# Patient Record
Sex: MALE | Race: WHITE | Employment: OTHER | URBAN - METROPOLITAN AREA
[De-identification: names, ages, dates, MRNs, and addresses within clinical notes are randomized per-mention and may not be internally consistent; named-entity substitution may affect disease eponyms.]

---

## 2018-03-01 ENCOUNTER — SURG/PROC ORDERS (OUTPATIENT)
Dept: CARDIOLOGY | Age: 83
End: 2018-03-01

## 2018-03-01 PROBLEM — R65.20 SEVERE SEPSIS WITH ACUTE ORGAN DYSFUNCTION (HCC): Status: ACTIVE | Noted: 2018-03-01

## 2018-03-01 PROBLEM — A41.9 SEVERE SEPSIS WITH ACUTE ORGAN DYSFUNCTION (HCC): Status: ACTIVE | Noted: 2018-03-01

## 2018-03-01 RX ORDER — DIPHENHYDRAMINE HCL 25 MG
25 TABLET ORAL
Status: CANCELLED | OUTPATIENT
Start: 2018-03-01 | End: 2018-03-01

## 2018-03-01 RX ORDER — SODIUM CHLORIDE 0.9 % (FLUSH) 0.9 %
10 SYRINGE (ML) INJECTION EVERY 12 HOURS SCHEDULED
Status: CANCELLED | OUTPATIENT
Start: 2018-03-01

## 2018-03-01 RX ORDER — DIAZEPAM 5 MG/1
5 TABLET ORAL
Status: CANCELLED | OUTPATIENT
Start: 2018-03-01 | End: 2018-03-01

## 2018-03-01 RX ORDER — SODIUM CHLORIDE 9 MG/ML
INJECTION, SOLUTION INTRAVENOUS CONTINUOUS
Status: CANCELLED | OUTPATIENT
Start: 2018-03-01

## 2018-03-01 RX ORDER — SODIUM CHLORIDE 0.9 % (FLUSH) 0.9 %
10 SYRINGE (ML) INJECTION PRN
Status: CANCELLED | OUTPATIENT
Start: 2018-03-01

## 2018-03-14 PROBLEM — G93.41 METABOLIC ENCEPHALOPATHY: Status: ACTIVE | Noted: 2018-03-14

## 2018-03-15 PROBLEM — R26.9 GAIT DISTURBANCE: Status: ACTIVE | Noted: 2018-03-15

## 2018-03-15 PROBLEM — G93.41 SEPSIS WITH METABOLIC ENCEPHALOPATHY (HCC): Status: ACTIVE | Noted: 2018-03-15

## 2018-03-15 PROBLEM — R41.89 IMPAIRED COGNITION: Status: ACTIVE | Noted: 2018-03-15

## 2018-03-15 PROBLEM — R65.20 SEPSIS WITH METABOLIC ENCEPHALOPATHY (HCC): Status: ACTIVE | Noted: 2018-03-15

## 2018-03-15 PROBLEM — A41.9 SEPSIS WITH METABOLIC ENCEPHALOPATHY (HCC): Status: ACTIVE | Noted: 2018-03-15

## 2019-03-02 ENCOUNTER — APPOINTMENT (OUTPATIENT)
Dept: GENERAL RADIOLOGY | Age: 84
DRG: 280 | End: 2019-03-02
Payer: MEDICARE

## 2019-03-02 ENCOUNTER — HOSPITAL ENCOUNTER (INPATIENT)
Age: 84
LOS: 3 days | Discharge: HOME | DRG: 280 | End: 2019-03-05
Attending: EMERGENCY MEDICINE | Admitting: HOSPITALIST
Payer: MEDICARE

## 2019-03-02 DIAGNOSIS — N18.30 STAGE 3 CHRONIC KIDNEY DISEASE (HCC): ICD-10-CM

## 2019-03-02 DIAGNOSIS — I50.9 ACUTE HEART FAILURE, UNSPECIFIED HEART FAILURE TYPE (HCC): Primary | ICD-10-CM

## 2019-03-02 PROBLEM — I50.43 CHF (CONGESTIVE HEART FAILURE), NYHA CLASS I, ACUTE ON CHRONIC, COMBINED (HCC): Status: ACTIVE | Noted: 2019-03-02

## 2019-03-02 LAB
ALBUMIN SERPL-MCNC: 3.6 GM/DL (ref 3.4–5)
ALP BLD-CCNC: 65 IU/L (ref 40–129)
ALT SERPL-CCNC: 11 U/L (ref 10–40)
ANION GAP SERPL CALCULATED.3IONS-SCNC: 13 MMOL/L (ref 4–16)
AST SERPL-CCNC: 21 IU/L (ref 15–37)
BASOPHILS ABSOLUTE: 0.1 K/CU MM
BASOPHILS RELATIVE PERCENT: 0.6 % (ref 0–1)
BILIRUB SERPL-MCNC: 0.3 MG/DL (ref 0–1)
BUN BLDV-MCNC: 19 MG/DL (ref 6–23)
CALCIUM SERPL-MCNC: 7.6 MG/DL (ref 8.3–10.6)
CHLORIDE BLD-SCNC: 104 MMOL/L (ref 99–110)
CO2: 20 MMOL/L (ref 21–32)
CREAT SERPL-MCNC: 1.3 MG/DL (ref 0.9–1.3)
D DIMER: 280 NG/ML(DDU)
DIFFERENTIAL TYPE: ABNORMAL
EKG ATRIAL RATE: 114 BPM
EKG DIAGNOSIS: NORMAL
EKG P AXIS: 114 DEGREES
EKG P-R INTERVAL: 166 MS
EKG Q-T INTERVAL: 316 MS
EKG QRS DURATION: 100 MS
EKG QTC CALCULATION (BAZETT): 435 MS
EKG R AXIS: 74 DEGREES
EKG T AXIS: 167 DEGREES
EKG VENTRICULAR RATE: 114 BPM
EOSINOPHILS ABSOLUTE: 0.1 K/CU MM
EOSINOPHILS RELATIVE PERCENT: 0.7 % (ref 0–3)
GFR AFRICAN AMERICAN: >60 ML/MIN/1.73M2
GFR NON-AFRICAN AMERICAN: 53 ML/MIN/1.73M2
GLUCOSE BLD-MCNC: 101 MG/DL (ref 70–99)
GLUCOSE BLD-MCNC: 148 MG/DL (ref 70–99)
HCT VFR BLD CALC: 32.7 % (ref 42–52)
HEMOGLOBIN: 9 GM/DL (ref 13.5–18)
IMMATURE NEUTROPHIL %: 0.4 % (ref 0–0.43)
LYMPHOCYTES ABSOLUTE: 0.5 K/CU MM
LYMPHOCYTES RELATIVE PERCENT: 6.5 % (ref 24–44)
MCH RBC QN AUTO: 21.5 PG (ref 27–31)
MCHC RBC AUTO-ENTMCNC: 27.5 % (ref 32–36)
MCV RBC AUTO: 78.2 FL (ref 78–100)
MONOCYTES ABSOLUTE: 1 K/CU MM
MONOCYTES RELATIVE PERCENT: 12.5 % (ref 0–4)
NUCLEATED RBC %: 0 %
PDW BLD-RTO: 17.4 % (ref 11.7–14.9)
PLATELET # BLD: 273 K/CU MM (ref 140–440)
PMV BLD AUTO: 10 FL (ref 7.5–11.1)
POTASSIUM SERPL-SCNC: 4.7 MMOL/L (ref 3.5–5.1)
PRO-BNP: 7281 PG/ML
RBC # BLD: 4.18 M/CU MM (ref 4.6–6.2)
SEGMENTED NEUTROPHILS ABSOLUTE COUNT: 6.6 K/CU MM
SEGMENTED NEUTROPHILS RELATIVE PERCENT: 79.3 % (ref 36–66)
SODIUM BLD-SCNC: 137 MMOL/L (ref 135–145)
TOTAL IMMATURE NEUTOROPHIL: 0.03 K/CU MM
TOTAL NUCLEATED RBC: 0 K/CU MM
TOTAL PROTEIN: 6.3 GM/DL (ref 6.4–8.2)
TROPONIN T: 0.01 NG/ML
WBC # BLD: 8.3 K/CU MM (ref 4–10.5)

## 2019-03-02 PROCEDURE — 80053 COMPREHEN METABOLIC PANEL: CPT

## 2019-03-02 PROCEDURE — 93005 ELECTROCARDIOGRAM TRACING: CPT

## 2019-03-02 PROCEDURE — 99285 EMERGENCY DEPT VISIT HI MDM: CPT

## 2019-03-02 PROCEDURE — 6370000000 HC RX 637 (ALT 250 FOR IP): Performed by: HOSPITALIST

## 2019-03-02 PROCEDURE — 82962 GLUCOSE BLOOD TEST: CPT

## 2019-03-02 PROCEDURE — 85025 COMPLETE CBC W/AUTO DIFF WBC: CPT

## 2019-03-02 PROCEDURE — 6360000002 HC RX W HCPCS: Performed by: HOSPITALIST

## 2019-03-02 PROCEDURE — 84484 ASSAY OF TROPONIN QUANT: CPT

## 2019-03-02 PROCEDURE — 71045 X-RAY EXAM CHEST 1 VIEW: CPT

## 2019-03-02 PROCEDURE — 6370000000 HC RX 637 (ALT 250 FOR IP): Performed by: EMERGENCY MEDICINE

## 2019-03-02 PROCEDURE — 2060000000 HC ICU INTERMEDIATE R&B

## 2019-03-02 PROCEDURE — 36415 COLL VENOUS BLD VENIPUNCTURE: CPT

## 2019-03-02 PROCEDURE — 85379 FIBRIN DEGRADATION QUANT: CPT

## 2019-03-02 PROCEDURE — 96374 THER/PROPH/DIAG INJ IV PUSH: CPT

## 2019-03-02 PROCEDURE — 93010 ELECTROCARDIOGRAM REPORT: CPT | Performed by: INTERNAL MEDICINE

## 2019-03-02 PROCEDURE — 99223 1ST HOSP IP/OBS HIGH 75: CPT | Performed by: INTERNAL MEDICINE

## 2019-03-02 PROCEDURE — 83880 ASSAY OF NATRIURETIC PEPTIDE: CPT

## 2019-03-02 RX ORDER — ONDANSETRON 2 MG/ML
4 INJECTION INTRAMUSCULAR; INTRAVENOUS EVERY 6 HOURS PRN
Status: DISCONTINUED | OUTPATIENT
Start: 2019-03-02 | End: 2019-03-03 | Stop reason: SDUPTHER

## 2019-03-02 RX ORDER — ASPIRIN 81 MG/1
324 TABLET, CHEWABLE ORAL ONCE
Status: COMPLETED | OUTPATIENT
Start: 2019-03-02 | End: 2019-03-02

## 2019-03-02 RX ORDER — LISINOPRIL 2.5 MG/1
2.5 TABLET ORAL DAILY
Status: DISCONTINUED | OUTPATIENT
Start: 2019-03-02 | End: 2019-03-03

## 2019-03-02 RX ORDER — ATORVASTATIN CALCIUM 40 MG/1
40 TABLET, FILM COATED ORAL NIGHTLY
Status: DISCONTINUED | OUTPATIENT
Start: 2019-03-02 | End: 2019-03-05 | Stop reason: HOSPADM

## 2019-03-02 RX ORDER — FUROSEMIDE 10 MG/ML
40 INJECTION INTRAMUSCULAR; INTRAVENOUS 2 TIMES DAILY
Status: DISCONTINUED | OUTPATIENT
Start: 2019-03-02 | End: 2019-03-03

## 2019-03-02 RX ORDER — FUROSEMIDE 10 MG/ML
40 INJECTION INTRAMUSCULAR; INTRAVENOUS ONCE
Status: COMPLETED | OUTPATIENT
Start: 2019-03-02 | End: 2019-03-02

## 2019-03-02 RX ORDER — SODIUM CHLORIDE 0.9 % (FLUSH) 0.9 %
10 SYRINGE (ML) INJECTION EVERY 12 HOURS SCHEDULED
Status: DISCONTINUED | OUTPATIENT
Start: 2019-03-02 | End: 2019-03-03 | Stop reason: SDUPTHER

## 2019-03-02 RX ORDER — ACETAMINOPHEN 325 MG/1
650 TABLET ORAL EVERY 4 HOURS PRN
Status: DISCONTINUED | OUTPATIENT
Start: 2019-03-02 | End: 2019-03-03 | Stop reason: SDUPTHER

## 2019-03-02 RX ORDER — POTASSIUM CHLORIDE 20 MEQ/1
20 TABLET, EXTENDED RELEASE ORAL 2 TIMES DAILY WITH MEALS
Status: DISCONTINUED | OUTPATIENT
Start: 2019-03-02 | End: 2019-03-03

## 2019-03-02 RX ORDER — SODIUM CHLORIDE 0.9 % (FLUSH) 0.9 %
10 SYRINGE (ML) INJECTION PRN
Status: DISCONTINUED | OUTPATIENT
Start: 2019-03-02 | End: 2019-03-03 | Stop reason: SDUPTHER

## 2019-03-02 RX ORDER — QUETIAPINE FUMARATE 25 MG/1
25 TABLET, FILM COATED ORAL NIGHTLY
Status: DISCONTINUED | OUTPATIENT
Start: 2019-03-02 | End: 2019-03-05 | Stop reason: HOSPADM

## 2019-03-02 RX ORDER — CARVEDILOL 3.12 MG/1
3.12 TABLET ORAL 2 TIMES DAILY WITH MEALS
Status: DISCONTINUED | OUTPATIENT
Start: 2019-03-02 | End: 2019-03-05 | Stop reason: HOSPADM

## 2019-03-02 RX ORDER — ASPIRIN 81 MG/1
81 TABLET, CHEWABLE ORAL DAILY
Status: DISCONTINUED | OUTPATIENT
Start: 2019-03-03 | End: 2019-03-05 | Stop reason: HOSPADM

## 2019-03-02 RX ORDER — CLOPIDOGREL BISULFATE 75 MG/1
75 TABLET ORAL DAILY
Status: DISCONTINUED | OUTPATIENT
Start: 2019-03-02 | End: 2019-03-05

## 2019-03-02 RX ORDER — PANTOPRAZOLE SODIUM 40 MG/1
40 TABLET, DELAYED RELEASE ORAL
Status: DISCONTINUED | OUTPATIENT
Start: 2019-03-03 | End: 2019-03-05 | Stop reason: HOSPADM

## 2019-03-02 RX ADMIN — POTASSIUM CHLORIDE 20 MEQ: 20 TABLET, EXTENDED RELEASE ORAL at 17:00

## 2019-03-02 RX ADMIN — FUROSEMIDE 40 MG: 10 INJECTION, SOLUTION INTRAMUSCULAR; INTRAVENOUS at 17:00

## 2019-03-02 RX ADMIN — CLOPIDOGREL BISULFATE 75 MG: 75 TABLET ORAL at 13:22

## 2019-03-02 RX ADMIN — FUROSEMIDE 40 MG: 10 INJECTION, SOLUTION INTRAVENOUS at 11:15

## 2019-03-02 RX ADMIN — ASPIRIN 81 MG 324 MG: 81 TABLET ORAL at 09:59

## 2019-03-02 RX ADMIN — ATORVASTATIN CALCIUM 40 MG: 40 TABLET, FILM COATED ORAL at 20:40

## 2019-03-02 RX ADMIN — ENOXAPARIN SODIUM 30 MG: 30 INJECTION SUBCUTANEOUS at 13:22

## 2019-03-02 RX ADMIN — CARVEDILOL 3.12 MG: 3.12 TABLET, FILM COATED ORAL at 17:00

## 2019-03-02 RX ADMIN — QUETIAPINE FUMARATE 25 MG: 25 TABLET ORAL at 20:40

## 2019-03-02 RX ADMIN — LISINOPRIL 2.5 MG: 2.5 TABLET ORAL at 15:02

## 2019-03-02 ASSESSMENT — PAIN DESCRIPTION - LOCATION
LOCATION: CHEST
LOCATION: CHEST

## 2019-03-02 ASSESSMENT — PAIN DESCRIPTION - DESCRIPTORS
DESCRIPTORS: PRESSURE
DESCRIPTORS: PRESSURE

## 2019-03-02 ASSESSMENT — PAIN DESCRIPTION - PAIN TYPE: TYPE: ACUTE PAIN

## 2019-03-02 ASSESSMENT — PAIN SCALES - GENERAL
PAINLEVEL_OUTOF10: 0
PAINLEVEL_OUTOF10: 4
PAINLEVEL_OUTOF10: 6

## 2019-03-03 PROBLEM — I25.5 ISCHEMIC CARDIOMYOPATHY: Status: ACTIVE | Noted: 2019-03-03

## 2019-03-03 LAB
ANION GAP SERPL CALCULATED.3IONS-SCNC: 13 MMOL/L (ref 4–16)
APTT: 42 SECONDS (ref 21.2–33)
APTT: 71.2 SECONDS (ref 21.2–33)
APTT: >240 SECONDS (ref 21.2–33)
BUN BLDV-MCNC: 22 MG/DL (ref 6–23)
CALCIUM SERPL-MCNC: 7.6 MG/DL (ref 8.3–10.6)
CHLORIDE BLD-SCNC: 104 MMOL/L (ref 99–110)
CO2: 23 MMOL/L (ref 21–32)
CREAT SERPL-MCNC: 1.5 MG/DL (ref 0.9–1.3)
EKG ATRIAL RATE: 76 BPM
EKG ATRIAL RATE: 90 BPM
EKG DIAGNOSIS: NORMAL
EKG DIAGNOSIS: NORMAL
EKG P AXIS: 102 DEGREES
EKG P AXIS: 38 DEGREES
EKG P-R INTERVAL: 148 MS
EKG P-R INTERVAL: 176 MS
EKG Q-T INTERVAL: 366 MS
EKG Q-T INTERVAL: 378 MS
EKG QRS DURATION: 74 MS
EKG QRS DURATION: 90 MS
EKG QTC CALCULATION (BAZETT): 425 MS
EKG QTC CALCULATION (BAZETT): 447 MS
EKG R AXIS: 68 DEGREES
EKG R AXIS: 72 DEGREES
EKG T AXIS: 114 DEGREES
EKG T AXIS: 177 DEGREES
EKG VENTRICULAR RATE: 76 BPM
EKG VENTRICULAR RATE: 90 BPM
FERRITIN: 10 NG/ML (ref 30–400)
GFR AFRICAN AMERICAN: 54 ML/MIN/1.73M2
GFR NON-AFRICAN AMERICAN: 45 ML/MIN/1.73M2
GLUCOSE BLD-MCNC: 103 MG/DL (ref 70–99)
HCT VFR BLD CALC: 32.2 % (ref 42–52)
HCT VFR BLD CALC: 32.5 % (ref 42–52)
HEMOGLOBIN: 8.8 GM/DL (ref 13.5–18)
HEMOGLOBIN: 8.9 GM/DL (ref 13.5–18)
IRON: 17 UG/DL (ref 59–158)
MCH RBC QN AUTO: 21.4 PG (ref 27–31)
MCH RBC QN AUTO: 21.4 PG (ref 27–31)
MCHC RBC AUTO-ENTMCNC: 27.3 % (ref 32–36)
MCHC RBC AUTO-ENTMCNC: 27.4 % (ref 32–36)
MCV RBC AUTO: 78.1 FL (ref 78–100)
MCV RBC AUTO: 78.3 FL (ref 78–100)
PCT TRANSFERRIN: 5 % (ref 10–44)
PDW BLD-RTO: 17.7 % (ref 11.7–14.9)
PDW BLD-RTO: 17.8 % (ref 11.7–14.9)
PLATELET # BLD: 249 K/CU MM (ref 140–440)
PLATELET # BLD: 262 K/CU MM (ref 140–440)
PMV BLD AUTO: 10.3 FL (ref 7.5–11.1)
PMV BLD AUTO: 10.7 FL (ref 7.5–11.1)
POTASSIUM SERPL-SCNC: 3.9 MMOL/L (ref 3.5–5.1)
RBC # BLD: 4.11 M/CU MM (ref 4.6–6.2)
RBC # BLD: 4.16 M/CU MM (ref 4.6–6.2)
SODIUM BLD-SCNC: 140 MMOL/L (ref 135–145)
TOTAL IRON BINDING CAPACITY: 364 UG/DL (ref 250–450)
TROPONIN T: 0.2 NG/ML
TROPONIN T: 0.29 NG/ML
UNSATURATED IRON BINDING CAPACITY: 347 UG/DL (ref 110–370)
WBC # BLD: 5.5 K/CU MM (ref 4–10.5)
WBC # BLD: 5.5 K/CU MM (ref 4–10.5)

## 2019-03-03 PROCEDURE — C1894 INTRO/SHEATH, NON-LASER: HCPCS

## 2019-03-03 PROCEDURE — 2709999900 HC NON-CHARGEABLE SUPPLY

## 2019-03-03 PROCEDURE — 99233 SBSQ HOSP IP/OBS HIGH 50: CPT | Performed by: INTERNAL MEDICINE

## 2019-03-03 PROCEDURE — 83550 IRON BINDING TEST: CPT

## 2019-03-03 PROCEDURE — 6360000002 HC RX W HCPCS

## 2019-03-03 PROCEDURE — 2500000003 HC RX 250 WO HCPCS

## 2019-03-03 PROCEDURE — 83540 ASSAY OF IRON: CPT

## 2019-03-03 PROCEDURE — 85730 THROMBOPLASTIN TIME PARTIAL: CPT

## 2019-03-03 PROCEDURE — B2151ZZ FLUOROSCOPY OF LEFT HEART USING LOW OSMOLAR CONTRAST: ICD-10-PCS | Performed by: INTERNAL MEDICINE

## 2019-03-03 PROCEDURE — 94761 N-INVAS EAR/PLS OXIMETRY MLT: CPT

## 2019-03-03 PROCEDURE — 84484 ASSAY OF TROPONIN QUANT: CPT

## 2019-03-03 PROCEDURE — 93458 L HRT ARTERY/VENTRICLE ANGIO: CPT

## 2019-03-03 PROCEDURE — 2580000003 HC RX 258: Performed by: HOSPITALIST

## 2019-03-03 PROCEDURE — 4A023N7 MEASUREMENT OF CARDIAC SAMPLING AND PRESSURE, LEFT HEART, PERCUTANEOUS APPROACH: ICD-10-PCS | Performed by: INTERNAL MEDICINE

## 2019-03-03 PROCEDURE — 2580000003 HC RX 258

## 2019-03-03 PROCEDURE — 85027 COMPLETE CBC AUTOMATED: CPT

## 2019-03-03 PROCEDURE — 93005 ELECTROCARDIOGRAM TRACING: CPT

## 2019-03-03 PROCEDURE — 6360000002 HC RX W HCPCS: Performed by: INTERNAL MEDICINE

## 2019-03-03 PROCEDURE — 6360000002 HC RX W HCPCS: Performed by: HOSPITALIST

## 2019-03-03 PROCEDURE — 82728 ASSAY OF FERRITIN: CPT

## 2019-03-03 PROCEDURE — 36415 COLL VENOUS BLD VENIPUNCTURE: CPT

## 2019-03-03 PROCEDURE — 6370000000 HC RX 637 (ALT 250 FOR IP): Performed by: INTERNAL MEDICINE

## 2019-03-03 PROCEDURE — 2060000000 HC ICU INTERMEDIATE R&B

## 2019-03-03 PROCEDURE — 6360000004 HC RX CONTRAST MEDICATION

## 2019-03-03 PROCEDURE — B2111ZZ FLUOROSCOPY OF MULTIPLE CORONARY ARTERIES USING LOW OSMOLAR CONTRAST: ICD-10-PCS | Performed by: INTERNAL MEDICINE

## 2019-03-03 PROCEDURE — 80048 BASIC METABOLIC PNL TOTAL CA: CPT

## 2019-03-03 PROCEDURE — 2580000003 HC RX 258: Performed by: INTERNAL MEDICINE

## 2019-03-03 PROCEDURE — 93010 ELECTROCARDIOGRAM REPORT: CPT | Performed by: INTERNAL MEDICINE

## 2019-03-03 PROCEDURE — 6370000000 HC RX 637 (ALT 250 FOR IP): Performed by: HOSPITALIST

## 2019-03-03 RX ORDER — HEPARIN SODIUM 5000 [USP'U]/ML
30 INJECTION, SOLUTION INTRAVENOUS; SUBCUTANEOUS PRN
Status: DISCONTINUED | OUTPATIENT
Start: 2019-03-03 | End: 2019-03-05

## 2019-03-03 RX ORDER — HEPARIN SODIUM 10000 [USP'U]/100ML
12 INJECTION, SOLUTION INTRAVENOUS CONTINUOUS
Status: DISCONTINUED | OUTPATIENT
Start: 2019-03-03 | End: 2019-03-05

## 2019-03-03 RX ORDER — HYDRALAZINE HYDROCHLORIDE 20 MG/ML
10 INJECTION INTRAMUSCULAR; INTRAVENOUS EVERY 10 MIN PRN
Status: DISCONTINUED | OUTPATIENT
Start: 2019-03-03 | End: 2019-03-05 | Stop reason: HOSPADM

## 2019-03-03 RX ORDER — FUROSEMIDE 40 MG/1
40 TABLET ORAL 2 TIMES DAILY
Status: DISCONTINUED | OUTPATIENT
Start: 2019-03-03 | End: 2019-03-03

## 2019-03-03 RX ORDER — FUROSEMIDE 40 MG/1
40 TABLET ORAL DAILY
Status: DISCONTINUED | OUTPATIENT
Start: 2019-03-03 | End: 2019-03-05

## 2019-03-03 RX ORDER — HEPARIN SODIUM 5000 [USP'U]/ML
4000 INJECTION, SOLUTION INTRAVENOUS; SUBCUTANEOUS ONCE
Status: COMPLETED | OUTPATIENT
Start: 2019-03-03 | End: 2019-03-03

## 2019-03-03 RX ORDER — LISINOPRIL 2.5 MG/1
2.5 TABLET ORAL DAILY
Status: DISCONTINUED | OUTPATIENT
Start: 2019-03-03 | End: 2019-03-05

## 2019-03-03 RX ORDER — ONDANSETRON 2 MG/ML
4 INJECTION INTRAMUSCULAR; INTRAVENOUS EVERY 6 HOURS PRN
Status: DISCONTINUED | OUTPATIENT
Start: 2019-03-03 | End: 2019-03-05 | Stop reason: HOSPADM

## 2019-03-03 RX ORDER — SODIUM CHLORIDE 0.9 % (FLUSH) 0.9 %
10 SYRINGE (ML) INJECTION EVERY 12 HOURS SCHEDULED
Status: DISCONTINUED | OUTPATIENT
Start: 2019-03-03 | End: 2019-03-05 | Stop reason: HOSPADM

## 2019-03-03 RX ORDER — SODIUM CHLORIDE 0.9 % (FLUSH) 0.9 %
10 SYRINGE (ML) INJECTION PRN
Status: DISCONTINUED | OUTPATIENT
Start: 2019-03-03 | End: 2019-03-05 | Stop reason: HOSPADM

## 2019-03-03 RX ORDER — HEPARIN SODIUM 1000 [USP'U]/ML
60 INJECTION, SOLUTION INTRAVENOUS; SUBCUTANEOUS PRN
Status: DISCONTINUED | OUTPATIENT
Start: 2019-03-03 | End: 2019-03-05

## 2019-03-03 RX ORDER — NITROGLYCERIN 0.4 MG/1
0.4 TABLET SUBLINGUAL EVERY 5 MIN PRN
Status: DISCONTINUED | OUTPATIENT
Start: 2019-03-03 | End: 2019-03-05 | Stop reason: HOSPADM

## 2019-03-03 RX ORDER — ACETAMINOPHEN 325 MG/1
650 TABLET ORAL EVERY 4 HOURS PRN
Status: DISCONTINUED | OUTPATIENT
Start: 2019-03-03 | End: 2019-03-05 | Stop reason: HOSPADM

## 2019-03-03 RX ORDER — 0.9 % SODIUM CHLORIDE 0.9 %
500 INTRAVENOUS SOLUTION INTRAVENOUS ONCE
Status: DISCONTINUED | OUTPATIENT
Start: 2019-03-03 | End: 2019-03-05 | Stop reason: HOSPADM

## 2019-03-03 RX ADMIN — PANTOPRAZOLE SODIUM 40 MG: 40 TABLET, DELAYED RELEASE ORAL at 07:45

## 2019-03-03 RX ADMIN — LISINOPRIL 2.5 MG: 2.5 TABLET ORAL at 13:14

## 2019-03-03 RX ADMIN — CARVEDILOL 3.12 MG: 3.12 TABLET, FILM COATED ORAL at 17:36

## 2019-03-03 RX ADMIN — ATORVASTATIN CALCIUM 40 MG: 40 TABLET, FILM COATED ORAL at 22:17

## 2019-03-03 RX ADMIN — CLOPIDOGREL BISULFATE 75 MG: 75 TABLET ORAL at 09:10

## 2019-03-03 RX ADMIN — SODIUM CHLORIDE, PRESERVATIVE FREE 10 ML: 5 INJECTION INTRAVENOUS at 22:17

## 2019-03-03 RX ADMIN — HEPARIN SODIUM AND DEXTROSE 12 UNITS/KG/HR: 10000; 5 INJECTION INTRAVENOUS at 13:10

## 2019-03-03 RX ADMIN — SODIUM CHLORIDE, PRESERVATIVE FREE 10 ML: 5 INJECTION INTRAVENOUS at 00:45

## 2019-03-03 RX ADMIN — SODIUM CHLORIDE, PRESERVATIVE FREE 10 ML: 5 INJECTION INTRAVENOUS at 09:12

## 2019-03-03 RX ADMIN — ENOXAPARIN SODIUM 30 MG: 30 INJECTION SUBCUTANEOUS at 09:09

## 2019-03-03 RX ADMIN — IRON SUCROSE 200 MG: 20 INJECTION, SOLUTION INTRAVENOUS at 12:41

## 2019-03-03 RX ADMIN — FUROSEMIDE 40 MG: 40 TABLET ORAL at 15:21

## 2019-03-03 RX ADMIN — ASPIRIN 81 MG 81 MG: 81 TABLET ORAL at 09:10

## 2019-03-03 RX ADMIN — QUETIAPINE FUMARATE 25 MG: 25 TABLET ORAL at 22:17

## 2019-03-03 RX ADMIN — HEPARIN SODIUM 4000 UNITS: 5000 INJECTION, SOLUTION INTRAVENOUS; SUBCUTANEOUS at 13:10

## 2019-03-03 RX ADMIN — CARVEDILOL 3.12 MG: 3.12 TABLET, FILM COATED ORAL at 09:10

## 2019-03-03 ASSESSMENT — PAIN SCALES - GENERAL
PAINLEVEL_OUTOF10: 0

## 2019-03-04 LAB
ANION GAP SERPL CALCULATED.3IONS-SCNC: 11 MMOL/L (ref 4–16)
APTT: 113.7 SECONDS (ref 21.2–33)
APTT: 138.9 SECONDS (ref 21.2–33)
APTT: 187.1 SECONDS (ref 21.2–33)
APTT: 80 SECONDS (ref 21.2–33)
BUN BLDV-MCNC: 22 MG/DL (ref 6–23)
CALCIUM SERPL-MCNC: 7.4 MG/DL (ref 8.3–10.6)
CHLORIDE BLD-SCNC: 106 MMOL/L (ref 99–110)
CO2: 26 MMOL/L (ref 21–32)
CREAT SERPL-MCNC: 1.5 MG/DL (ref 0.9–1.3)
GFR AFRICAN AMERICAN: 54 ML/MIN/1.73M2
GFR NON-AFRICAN AMERICAN: 45 ML/MIN/1.73M2
GLUCOSE BLD-MCNC: 103 MG/DL (ref 70–99)
LV EF: 38 %
LVEF MODALITY: NORMAL
POTASSIUM SERPL-SCNC: 3.8 MMOL/L (ref 3.5–5.1)
SODIUM BLD-SCNC: 143 MMOL/L (ref 135–145)

## 2019-03-04 PROCEDURE — 90686 IIV4 VACC NO PRSV 0.5 ML IM: CPT | Performed by: INTERNAL MEDICINE

## 2019-03-04 PROCEDURE — G0008 ADMIN INFLUENZA VIRUS VAC: HCPCS | Performed by: INTERNAL MEDICINE

## 2019-03-04 PROCEDURE — 6360000002 HC RX W HCPCS: Performed by: INTERNAL MEDICINE

## 2019-03-04 PROCEDURE — 85730 THROMBOPLASTIN TIME PARTIAL: CPT

## 2019-03-04 PROCEDURE — 80048 BASIC METABOLIC PNL TOTAL CA: CPT

## 2019-03-04 PROCEDURE — 94761 N-INVAS EAR/PLS OXIMETRY MLT: CPT

## 2019-03-04 PROCEDURE — 99233 SBSQ HOSP IP/OBS HIGH 50: CPT | Performed by: INTERNAL MEDICINE

## 2019-03-04 PROCEDURE — 6370000000 HC RX 637 (ALT 250 FOR IP): Performed by: HOSPITALIST

## 2019-03-04 PROCEDURE — 93306 TTE W/DOPPLER COMPLETE: CPT

## 2019-03-04 PROCEDURE — 36415 COLL VENOUS BLD VENIPUNCTURE: CPT

## 2019-03-04 PROCEDURE — 2580000003 HC RX 258: Performed by: INTERNAL MEDICINE

## 2019-03-04 PROCEDURE — 2060000000 HC ICU INTERMEDIATE R&B

## 2019-03-04 PROCEDURE — 6370000000 HC RX 637 (ALT 250 FOR IP): Performed by: INTERNAL MEDICINE

## 2019-03-04 RX ORDER — ASPIRIN 81 MG/1
81 TABLET, CHEWABLE ORAL DAILY
Status: DISCONTINUED | OUTPATIENT
Start: 2019-03-04 | End: 2019-03-05 | Stop reason: HOSPADM

## 2019-03-04 RX ADMIN — QUETIAPINE FUMARATE 25 MG: 25 TABLET ORAL at 20:41

## 2019-03-04 RX ADMIN — SODIUM CHLORIDE, PRESERVATIVE FREE 10 ML: 5 INJECTION INTRAVENOUS at 11:10

## 2019-03-04 RX ADMIN — CLOPIDOGREL BISULFATE 75 MG: 75 TABLET ORAL at 08:30

## 2019-03-04 RX ADMIN — FUROSEMIDE 40 MG: 40 TABLET ORAL at 08:31

## 2019-03-04 RX ADMIN — CARVEDILOL 3.12 MG: 3.12 TABLET, FILM COATED ORAL at 18:56

## 2019-03-04 RX ADMIN — CARVEDILOL 3.12 MG: 3.12 TABLET, FILM COATED ORAL at 08:31

## 2019-03-04 RX ADMIN — PANTOPRAZOLE SODIUM 40 MG: 40 TABLET, DELAYED RELEASE ORAL at 06:15

## 2019-03-04 RX ADMIN — IRON SUCROSE 200 MG: 20 INJECTION, SOLUTION INTRAVENOUS at 11:09

## 2019-03-04 RX ADMIN — LISINOPRIL 2.5 MG: 2.5 TABLET ORAL at 08:30

## 2019-03-04 RX ADMIN — ASPIRIN 81 MG 81 MG: 81 TABLET ORAL at 08:31

## 2019-03-04 RX ADMIN — SODIUM CHLORIDE, PRESERVATIVE FREE 10 ML: 5 INJECTION INTRAVENOUS at 20:42

## 2019-03-04 RX ADMIN — HEPARIN SODIUM AND DEXTROSE 6 UNITS/KG/HR: 10000; 5 INJECTION INTRAVENOUS at 19:36

## 2019-03-04 RX ADMIN — INFLUENZA A VIRUS A/MICHIGAN/45/2015 X-275 (H1N1) ANTIGEN (FORMALDEHYDE INACTIVATED), INFLUENZA A VIRUS A/SINGAPORE/INFIMH-16-0019/2016 IVR-186 (H3N2) ANTIGEN (FORMALDEHYDE INACTIVATED), INFLUENZA B VIRUS B/PHUKET/3073/2013 ANTIGEN (FORMALDEHYDE INACTIVATED), AND INFLUENZA B VIRUS B/MARYLAND/15/2016 BX-69A ANTIGEN (FORMALDEHYDE INACTIVATED) 0.5 ML: 15; 15; 15; 15 INJECTION, SUSPENSION INTRAMUSCULAR at 11:10

## 2019-03-04 RX ADMIN — ATORVASTATIN CALCIUM 40 MG: 40 TABLET, FILM COATED ORAL at 20:41

## 2019-03-04 ASSESSMENT — PAIN SCALES - GENERAL
PAINLEVEL_OUTOF10: 0

## 2019-03-05 ENCOUNTER — TELEPHONE (OUTPATIENT)
Dept: CARDIOLOGY CLINIC | Age: 84
End: 2019-03-05

## 2019-03-05 VITALS
HEIGHT: 68 IN | BODY MASS INDEX: 29.47 KG/M2 | TEMPERATURE: 98 F | DIASTOLIC BLOOD PRESSURE: 48 MMHG | RESPIRATION RATE: 22 BRPM | SYSTOLIC BLOOD PRESSURE: 94 MMHG | HEART RATE: 82 BPM | OXYGEN SATURATION: 100 % | WEIGHT: 194.45 LBS

## 2019-03-05 LAB
ANION GAP SERPL CALCULATED.3IONS-SCNC: 10 MMOL/L (ref 4–16)
APTT: 91.9 SECONDS (ref 21.2–33)
BUN BLDV-MCNC: 26 MG/DL (ref 6–23)
CALCIUM SERPL-MCNC: 7.4 MG/DL (ref 8.3–10.6)
CHLORIDE BLD-SCNC: 103 MMOL/L (ref 99–110)
CO2: 26 MMOL/L (ref 21–32)
CREAT SERPL-MCNC: 1.8 MG/DL (ref 0.9–1.3)
GFR AFRICAN AMERICAN: 44 ML/MIN/1.73M2
GFR NON-AFRICAN AMERICAN: 36 ML/MIN/1.73M2
GLUCOSE BLD-MCNC: 105 MG/DL (ref 70–99)
HCT VFR BLD CALC: 31.1 % (ref 42–52)
HEMOGLOBIN: 8.5 GM/DL (ref 13.5–18)
MCH RBC QN AUTO: 21 PG (ref 27–31)
MCHC RBC AUTO-ENTMCNC: 27.3 % (ref 32–36)
MCV RBC AUTO: 76.8 FL (ref 78–100)
PDW BLD-RTO: 18 % (ref 11.7–14.9)
PLATELET # BLD: 252 K/CU MM (ref 140–440)
PMV BLD AUTO: 10.6 FL (ref 7.5–11.1)
POTASSIUM SERPL-SCNC: 3.7 MMOL/L (ref 3.5–5.1)
RBC # BLD: 4.05 M/CU MM (ref 4.6–6.2)
SODIUM BLD-SCNC: 139 MMOL/L (ref 135–145)
WBC # BLD: 6.6 K/CU MM (ref 4–10.5)

## 2019-03-05 PROCEDURE — 80048 BASIC METABOLIC PNL TOTAL CA: CPT

## 2019-03-05 PROCEDURE — 93308 TTE F-UP OR LMTD: CPT

## 2019-03-05 PROCEDURE — 2580000003 HC RX 258: Performed by: INTERNAL MEDICINE

## 2019-03-05 PROCEDURE — 85027 COMPLETE CBC AUTOMATED: CPT

## 2019-03-05 PROCEDURE — 6370000000 HC RX 637 (ALT 250 FOR IP): Performed by: THORACIC SURGERY (CARDIOTHORACIC VASCULAR SURGERY)

## 2019-03-05 PROCEDURE — 85730 THROMBOPLASTIN TIME PARTIAL: CPT

## 2019-03-05 PROCEDURE — 6360000002 HC RX W HCPCS: Performed by: INTERNAL MEDICINE

## 2019-03-05 PROCEDURE — 99233 SBSQ HOSP IP/OBS HIGH 50: CPT | Performed by: INTERNAL MEDICINE

## 2019-03-05 PROCEDURE — 94761 N-INVAS EAR/PLS OXIMETRY MLT: CPT

## 2019-03-05 PROCEDURE — 36415 COLL VENOUS BLD VENIPUNCTURE: CPT

## 2019-03-05 PROCEDURE — 6370000000 HC RX 637 (ALT 250 FOR IP): Performed by: INTERNAL MEDICINE

## 2019-03-05 RX ORDER — CARVEDILOL 3.12 MG/1
3.12 TABLET ORAL 2 TIMES DAILY WITH MEALS
Qty: 60 TABLET | Refills: 3 | Status: SHIPPED | OUTPATIENT
Start: 2019-03-05 | End: 2019-03-08

## 2019-03-05 RX ORDER — ISOSORBIDE MONONITRATE 30 MG/1
30 TABLET, EXTENDED RELEASE ORAL DAILY
Status: DISCONTINUED | OUTPATIENT
Start: 2019-03-05 | End: 2019-03-05 | Stop reason: HOSPADM

## 2019-03-05 RX ORDER — ISOSORBIDE MONONITRATE 30 MG/1
30 TABLET, EXTENDED RELEASE ORAL DAILY
Qty: 30 TABLET | Refills: 3 | Status: SHIPPED | OUTPATIENT
Start: 2019-03-06 | End: 2019-06-28 | Stop reason: SDUPTHER

## 2019-03-05 RX ORDER — CLOPIDOGREL BISULFATE 75 MG/1
75 TABLET ORAL DAILY
Status: DISCONTINUED | OUTPATIENT
Start: 2019-03-05 | End: 2019-03-05 | Stop reason: HOSPADM

## 2019-03-05 RX ORDER — NITROGLYCERIN 0.4 MG/1
TABLET SUBLINGUAL
Qty: 25 TABLET | Refills: 3 | Status: SHIPPED | OUTPATIENT
Start: 2019-03-05 | End: 2020-01-13

## 2019-03-05 RX ORDER — ATORVASTATIN CALCIUM 40 MG/1
40 TABLET, FILM COATED ORAL NIGHTLY
Qty: 30 TABLET | Refills: 3 | Status: SHIPPED | OUTPATIENT
Start: 2019-03-05

## 2019-03-05 RX ORDER — HYDRALAZINE HYDROCHLORIDE 10 MG/1
10 TABLET, FILM COATED ORAL 3 TIMES DAILY
Status: DISCONTINUED | OUTPATIENT
Start: 2019-03-05 | End: 2019-03-05 | Stop reason: HOSPADM

## 2019-03-05 RX ADMIN — ASPIRIN 81 MG 81 MG: 81 TABLET ORAL at 10:09

## 2019-03-05 RX ADMIN — CLOPIDOGREL BISULFATE 75 MG: 75 TABLET ORAL at 10:09

## 2019-03-05 RX ADMIN — SODIUM CHLORIDE, PRESERVATIVE FREE 10 ML: 5 INJECTION INTRAVENOUS at 10:11

## 2019-03-05 RX ADMIN — IRON SUCROSE 200 MG: 20 INJECTION, SOLUTION INTRAVENOUS at 10:12

## 2019-03-05 RX ADMIN — PANTOPRAZOLE SODIUM 40 MG: 40 TABLET, DELAYED RELEASE ORAL at 10:09

## 2019-03-06 ENCOUNTER — CARE COORDINATION (OUTPATIENT)
Dept: CASE MANAGEMENT | Age: 84
End: 2019-03-06

## 2019-03-07 ENCOUNTER — CARE COORDINATION (OUTPATIENT)
Dept: CASE MANAGEMENT | Age: 84
End: 2019-03-07

## 2019-03-08 ENCOUNTER — OFFICE VISIT (OUTPATIENT)
Dept: CARDIOLOGY CLINIC | Age: 84
End: 2019-03-08
Payer: MEDICARE

## 2019-03-08 VITALS
BODY MASS INDEX: 30.31 KG/M2 | DIASTOLIC BLOOD PRESSURE: 70 MMHG | OXYGEN SATURATION: 99 % | HEART RATE: 76 BPM | SYSTOLIC BLOOD PRESSURE: 118 MMHG | RESPIRATION RATE: 24 BRPM | WEIGHT: 200 LBS | HEIGHT: 68 IN

## 2019-03-08 DIAGNOSIS — I50.22 CHRONIC SYSTOLIC CONGESTIVE HEART FAILURE (HCC): Primary | ICD-10-CM

## 2019-03-08 PROCEDURE — 99213 OFFICE O/P EST LOW 20 MIN: CPT | Performed by: NURSE PRACTITIONER

## 2019-03-08 RX ORDER — CARVEDILOL 6.25 MG/1
6.25 TABLET ORAL 2 TIMES DAILY WITH MEALS
Qty: 60 TABLET | Refills: 3 | Status: SHIPPED | OUTPATIENT
Start: 2019-03-08 | End: 2019-04-24 | Stop reason: DRUGHIGH

## 2019-03-08 RX ORDER — CLOPIDOGREL BISULFATE 75 MG/1
75 TABLET ORAL DAILY
Qty: 30 TABLET | Refills: 0 | Status: SHIPPED | OUTPATIENT
Start: 2019-03-08 | End: 2020-01-13 | Stop reason: ALTCHOICE

## 2019-03-22 ENCOUNTER — OFFICE VISIT (OUTPATIENT)
Dept: CARDIOLOGY CLINIC | Age: 84
End: 2019-03-22
Payer: MEDICARE

## 2019-03-22 ENCOUNTER — TELEPHONE (OUTPATIENT)
Dept: CARDIOLOGY CLINIC | Age: 84
End: 2019-03-22

## 2019-03-22 VITALS
WEIGHT: 198 LBS | SYSTOLIC BLOOD PRESSURE: 114 MMHG | OXYGEN SATURATION: 98 % | DIASTOLIC BLOOD PRESSURE: 68 MMHG | BODY MASS INDEX: 30.11 KG/M2 | HEART RATE: 62 BPM

## 2019-03-22 DIAGNOSIS — I50.22 CHRONIC SYSTOLIC CONGESTIVE HEART FAILURE (HCC): Primary | ICD-10-CM

## 2019-03-22 PROCEDURE — 99213 OFFICE O/P EST LOW 20 MIN: CPT | Performed by: NURSE PRACTITIONER

## 2019-03-22 RX ORDER — ENALAPRIL MALEATE 5 MG/1
5 TABLET ORAL DAILY
COMMUNITY
End: 2019-03-22 | Stop reason: SDUPTHER

## 2019-03-22 RX ORDER — ENALAPRIL MALEATE 5 MG/1
5 TABLET ORAL DAILY
Qty: 30 TABLET | Refills: 5 | Status: SHIPPED | OUTPATIENT
Start: 2019-03-22 | End: 2019-04-12

## 2019-03-25 ENCOUNTER — CARE COORDINATION (OUTPATIENT)
Dept: CASE MANAGEMENT | Age: 84
End: 2019-03-25

## 2019-04-05 ENCOUNTER — OFFICE VISIT (OUTPATIENT)
Dept: CARDIOLOGY CLINIC | Age: 84
End: 2019-04-05
Payer: MEDICARE

## 2019-04-05 VITALS
BODY MASS INDEX: 31.19 KG/M2 | SYSTOLIC BLOOD PRESSURE: 136 MMHG | WEIGHT: 205.8 LBS | HEART RATE: 62 BPM | RESPIRATION RATE: 16 BRPM | HEIGHT: 68 IN | OXYGEN SATURATION: 98 % | DIASTOLIC BLOOD PRESSURE: 70 MMHG

## 2019-04-05 DIAGNOSIS — I10 ESSENTIAL HYPERTENSION: Primary | Chronic | ICD-10-CM

## 2019-04-05 LAB
BUN BLDV-MCNC: 21 MG/DL
CALCIUM SERPL-MCNC: 8.3 MG/DL
CHLORIDE BLD-SCNC: 106 MMOL/L
CO2: 28 MMOL/L
CREAT SERPL-MCNC: 1.1 MG/DL
GFR CALCULATED: NORMAL
GLUCOSE BLD-MCNC: 107 MG/DL
POTASSIUM SERPL-SCNC: 5 MMOL/L
SODIUM BLD-SCNC: 140 MMOL/L

## 2019-04-05 PROCEDURE — 99211 OFF/OP EST MAY X REQ PHY/QHP: CPT | Performed by: NURSE PRACTITIONER

## 2019-04-08 ENCOUNTER — CARE COORDINATION (OUTPATIENT)
Dept: CASE MANAGEMENT | Age: 84
End: 2019-04-08

## 2019-04-08 NOTE — CARE COORDINATION
[facility] Transitions Follow Up Call    2019    Patient: Pauly Vidal  Patient : 1935   MRN: <[id number]>  Reason for Admission:   Discharge Date: 3/5/19 RARS: Readmission Risk Score: 24         Spoke with: Patients daughter     Care Transitions Subsequent and Final Call    Subsequent and Final Calls  Care Transitions Interventions  Other Interventions: Follow Up: spoke with daughter who is POA, states that as far as she knows he is weighing himself every day. Daughter said she has not talked to him today so she really could not say how he was doing today, I informed her I would call and talk to patient, daughter said he will not answer the phone so that probably would not work. Phone call was very brief today, seemed daughter did not want to continue phone call today.        Future Appointments   Date Time Provider Cher Michel   2019  9:30 AM Shereen Gregory MD [facility]       Asif Vaughn RN

## 2019-04-12 DIAGNOSIS — I50.43 SYSTOLIC AND DIASTOLIC CHF, ACUTE ON CHRONIC (HCC): Primary | ICD-10-CM

## 2019-04-12 RX ORDER — ENALAPRIL MALEATE 5 MG/1
10 TABLET ORAL DAILY
Qty: 30 TABLET | Refills: 2 | Status: SHIPPED | OUTPATIENT
Start: 2019-04-12 | End: 2019-04-12

## 2019-04-12 RX ORDER — ENALAPRIL MALEATE 10 MG/1
10 TABLET ORAL DAILY
Qty: 30 TABLET | Refills: 2 | Status: SHIPPED | OUTPATIENT
Start: 2019-04-12 | End: 2019-04-24 | Stop reason: DRUGHIGH

## 2019-04-22 ENCOUNTER — CARE COORDINATION (OUTPATIENT)
Dept: CASE MANAGEMENT | Age: 84
End: 2019-04-22

## 2019-04-22 LAB
BUN BLDV-MCNC: 25 MG/DL
CALCIUM SERPL-MCNC: 8.5 MG/DL
CHLORIDE BLD-SCNC: 105 MMOL/L
CO2: 24 MMOL/L
CREAT SERPL-MCNC: 1.7 MG/DL
GFR CALCULATED: NORMAL
GLUCOSE BLD-MCNC: 112 MG/DL
POTASSIUM SERPL-SCNC: 5.1 MMOL/L
SODIUM BLD-SCNC: 142 MMOL/L

## 2019-04-23 LAB
BUN BLDV-MCNC: 25 MG/DL
CALCIUM SERPL-MCNC: 8.5 MG/DL
CHLORIDE BLD-SCNC: 105 MMOL/L
CO2: 24 MMOL/L
CREAT SERPL-MCNC: 1.7 MG/DL
GFR CALCULATED: 37
GLUCOSE BLD-MCNC: 112 MG/DL
POTASSIUM SERPL-SCNC: 5.1 MMOL/L
SODIUM BLD-SCNC: 142 MMOL/L

## 2019-04-24 ENCOUNTER — NURSE ONLY (OUTPATIENT)
Dept: CARDIOLOGY CLINIC | Age: 84
End: 2019-04-24
Payer: MEDICARE

## 2019-04-24 VITALS
DIASTOLIC BLOOD PRESSURE: 50 MMHG | BODY MASS INDEX: 30.01 KG/M2 | HEART RATE: 62 BPM | HEIGHT: 68 IN | WEIGHT: 198 LBS | SYSTOLIC BLOOD PRESSURE: 90 MMHG

## 2019-04-24 DIAGNOSIS — I10 ESSENTIAL HYPERTENSION: Primary | ICD-10-CM

## 2019-04-24 PROCEDURE — 99211 OFF/OP EST MAY X REQ PHY/QHP: CPT | Performed by: INTERNAL MEDICINE

## 2019-04-24 RX ORDER — ENALAPRIL MALEATE 5 MG/1
5 TABLET ORAL DAILY
Qty: 30 TABLET | Refills: 3 | Status: SHIPPED | OUTPATIENT
Start: 2019-04-24 | End: 2019-12-07 | Stop reason: ALTCHOICE

## 2019-04-24 RX ORDER — CARVEDILOL 3.12 MG/1
3.12 TABLET ORAL 2 TIMES DAILY WITH MEALS
Qty: 60 TABLET | Refills: 3 | Status: SHIPPED | OUTPATIENT
Start: 2019-04-24 | End: 2019-05-06

## 2019-04-24 NOTE — PROGRESS NOTES
Spoke with Dr. Ozzy Tapia. He wants patient to now take vasotec 5mg and coreg 3.125. Then follow up at [facility] clinic in 2 weeks.

## 2019-05-01 ENCOUNTER — CARE COORDINATION (OUTPATIENT)
Dept: CASE MANAGEMENT | Age: 84
End: 2019-05-01

## 2019-05-06 ENCOUNTER — OFFICE VISIT (OUTPATIENT)
Dept: CARDIOLOGY CLINIC | Age: 84
End: 2019-05-06
Payer: MEDICARE

## 2019-05-06 VITALS
HEIGHT: 68 IN | SYSTOLIC BLOOD PRESSURE: 90 MMHG | DIASTOLIC BLOOD PRESSURE: 60 MMHG | RESPIRATION RATE: 16 BRPM | OXYGEN SATURATION: 93 % | WEIGHT: 195 LBS | BODY MASS INDEX: 29.55 KG/M2 | HEART RATE: 63 BPM

## 2019-05-06 DIAGNOSIS — I10 ESSENTIAL HYPERTENSION: Chronic | ICD-10-CM

## 2019-05-06 DIAGNOSIS — I25.5 ISCHEMIC CARDIOMYOPATHY: Primary | ICD-10-CM

## 2019-05-06 PROBLEM — I50.42 CHRONIC COMBINED SYSTOLIC AND DIASTOLIC HEART FAILURE (HCC): Status: ACTIVE | Noted: 2019-05-06

## 2019-05-06 PROBLEM — I50.22 CHRONIC SYSTOLIC HEART FAILURE (HCC): Status: ACTIVE | Noted: 2019-05-06

## 2019-05-06 PROCEDURE — 99212 OFFICE O/P EST SF 10 MIN: CPT | Performed by: NURSE PRACTITIONER

## 2019-05-06 PROCEDURE — G8427 DOCREV CUR MEDS BY ELIG CLIN: HCPCS | Performed by: NURSE PRACTITIONER

## 2019-05-06 PROCEDURE — G8598 ASA/ANTIPLAT THER USED: HCPCS | Performed by: NURSE PRACTITIONER

## 2019-05-06 PROCEDURE — 4040F PNEUMOC VAC/ADMIN/RCVD: CPT | Performed by: NURSE PRACTITIONER

## 2019-05-06 PROCEDURE — 1123F ACP DISCUSS/DSCN MKR DOCD: CPT | Performed by: NURSE PRACTITIONER

## 2019-05-06 PROCEDURE — 1036F TOBACCO NON-USER: CPT | Performed by: NURSE PRACTITIONER

## 2019-05-06 PROCEDURE — G8417 CALC BMI ABV UP PARAM F/U: HCPCS | Performed by: NURSE PRACTITIONER

## 2019-05-06 NOTE — PROGRESS NOTES
[address]      Visit Date: 5/6/2019  Cardiologist:  Dr. Dave De León  Primary Care Physician: Dr. Aleta Zamora MD    Ruthy Schneider is a 81 y.o. male who presents today for:  Chief Complaint   Patient presents with    Congestive Heart Failure       HPI:   Ruthy Schneider is a 81 y.o. male who presents to the office for a follow up visit in the heart failure clinic. He notes he has had trouble with his BP being low- his medications have been adjusted per his PCP. He has been dizzy and feeling near syncopal.    His daughter is with him today. She reports that he is non compliant with diet and fluid. He is drinking 3 beers or more per day. Patient has: Ischemic cardiomyopathy - chronic combined heart failure -  HTN  Last hospital admission related to Heart Failure:  03/02/2019  Chest Pain: no  Worsening SOB/orthopnea/PND: no  Edema: trace ankle edema   Any extra diuretic use: no  Weight gain: no  Compliant checking home weight: yes  Fatigue: no  Abdominal bloating: no  Appetite: fair  Difficulty sleeping: no  ANGIE: no  Cough: no  Compliant checking blood pressure: no  Compliant with medication regimen: yes  Any refills on CHF medications needed at this time: no  Vaccinations:  flu Yes  Vaccinations : pneumonia no    Past Medical History:   Diagnosis Date    Colon Cancer 7-24-12    \"Took 1.5 Foot Large Colon\"    Diabetes mellitus ([facility] [address] Dx 1998    Gangrene ([facility] [address] 1998    \"Gangrene Right Foot, Amputated Right Little Toe And Little Bit Of Right Foot\"    H/O percutaneous left heart catheterization 03/02/2019    Severe multivessel disease.    [facility] OTHER MEDICAL     Primary Care Physician Is Dr. Aleta Zamora    Hypertension     Malignant neoplasm of kidney [facility]) Right    MI (myocardial infarction) ([facility] [address] 03/02/2019    NSTEMI    Nerve damage     \"Both Feet\"    Renal mass 2012    right    UTI (urinary tract infection)     Last One 2-13    Wears glasses     \"Just To Read\"     Past Surgical History: Procedure Laterality Date    COLECTOMY  12    \"Took 1.5 Foot Large Colon\", Colon Cancer    COLON SURGERY      2012    COLONOSCOPY      EYE SURGERY Bilateral     Cataracts With Lens Implants Eyes    FOOT SURGERY Right     \"Gangrene Right Foot, Amputated Right Little Toe, Little Bit Right Foot\"    OTHER SURGICAL HISTORY Right 2013    right robotic assist radical nephrectomy    TOE AMPUTATION      little toes , bilaterally     TOE AMPUTATION Left     \"Little Toe\"   [facility]     History reviewed. No pertinent family history. Social History     Tobacco Use    Smoking status: Former Smoker     Packs/day: 1.50     Years: 35.00     Pack years: 52.50     Last attempt to quit: 1977     Years since quittin.7    Smokeless tobacco: Never Used   Substance Use Topics    Alcohol use: Yes     Alcohol/week: 3.6 oz     Types: 6 Cans of beer per week     Comment: \"Beer Daily\"     Current Outpatient Medications   Medication Sig Dispense Refill    enalapril (VASOTEC) 5 MG tablet Take 1 tablet by mouth daily 30 tablet 3    clopidogrel (PLAVIX) 75 MG tablet Take 1 tablet by mouth daily 30 tablet 0    isosorbide mononitrate (IMDUR) 30 MG extended release tablet Take 1 tablet by mouth daily 30 tablet 3    nitroGLYCERIN (NITROSTAT) 0.4 MG SL tablet up to max of 3 total doses. If no relief after 1 dose, call 911. 25 tablet 3    atorvastatin (LIPITOR) 40 MG tablet Take 1 tablet by mouth nightly 30 tablet 3    aspirin 81 MG chewable tablet Take 1 tablet by mouth daily 30 tablet 0     No current facility-administered medications for this visit. No Known Allergies    SUBJECTIVE:   Review of Systems:   · Constitutional: No Fever,no unintentional weight Loss   · Eyes: No change in Vision:     · ENT: No Headaches, Hearing Loss or Vertigo. No tinnitus   · Cardiovascular: as per note above   · Respiratory: No cough or wheezing and as per note above. · Gastrointestinal: no abdominal pain, no appetite loss, no blood in stools, constipation, or diarrhea, No heartburn  · Genitourinary: No dysuria, trouble voiding, or hematuria  · Musculoskeletal: back pain- No: myalgia No,  Arthralgia- No  · Integumentary: No rash or pruritis  · Neurological: No TIA or stroke symptoms  · Psychiatric: Anxiety- No: depression-  No   · Endocrine: No malaise-No, fatigue No,- no temperature intolerance  · Hematologic/Lymphatic: No bleeding problems, blood clots or swollen lymph nodes  · Allergic/Immunologic: No nasal congestion or hives    OBJECTIVE:   Today's Vitals:  BP 90/60 (Position: Standing)   Pulse 63   Resp 16   Ht 5' 8\" (1.727 m)   Wt 195 lb (88.5 kg)   SpO2 93%   BMI 29.65 kg/m²     Wt Readings from Last 3 Encounters:   05/06/19 195 lb (88.5 kg)   04/24/19 198 lb (89.8 kg)   04/05/19 205 lb 12.8 oz (93.4 kg)     BP Readings from Last 3 Encounters:   05/06/19 90/60   04/24/19 (!) 90/50   04/05/19 136/70     Pulse Readings from Last 3 Encounters:   05/06/19 63   04/24/19 62   04/05/19 62     Body mass index is 29.65 kg/m². GENERAL - Alert, oriented, pleasant, in no apparent distress. Head -unremarkable  Eyes - pupils equal and reactive to light - bilateral conjunctiva are pink: sclera are white   ENT - external ears intact, nose is intact:  tongue is midline pink and moist  Neck - Supple. Jugular venous distention noted No: carotid bruits appreciated No.   Cardiovascular - Normal S1 and S2: murmur appreciated No, No gallop. Regular rate- Yes,  rhythm regular-Yes. Extremities - No cyanosis, clubbing, trace edema to ankles    Pulmonary - No respiratory distress. No wheezes or rales. Chest is clear in all lung fields   Pulses: Bilateral radial and pedal pulses normal  Abdomen -  Soft no tenderness, non distended   Musculoskeletal - Normal movement of all extremities   Neurologic - alert and oriented: There are no gross focal neurologic abnormalities.    Skin-  No rash: No echymosis   Affect- normal mood and pleasant     6 minute walk test:  Time walked 4 minute  Distance walked 480  Required Oxygen No    Most recent ECHO: 03/05/2019   Limited study to evaluate for LV clot x Definity used.   Ejection fraction is visually estimated at 30-35%.   No filling defect , No apical thrombus   Fillmore is dyskinetic    Echo 03/04/2019   Left ventricular function is moderately abnormal, EF is estimated at 35-40   %.   Grade II diastolic dysfunction.   Mild left ventricular hypertrophy.   Apical wall is akinetic , Thrombus cannot be ruled out   Structurally normal mitral valve.   Mild to Moderate mitral regurgitation is present.   Tricuspid valve is structurally normal.   RVSP is 30 mmHg.   No evidence of pericardial effusion.   No evidence of pleural effusion.       Results reviewed:  BNP:   Lab Results   Component Value Date     (H) 07/26/2012    PROBNP 7,281 (H) 03/02/2019     CBC:   Lab Results   Component Value Date    WBC 6.6 03/05/2019    RBC 4.05 03/05/2019    HGB 8.5 03/05/2019    HCT 31.1 03/05/2019     03/05/2019     CMP:    Lab Results   Component Value Date     04/23/2019    K 5.1 04/23/2019    K 4.6 03/14/2018     04/23/2019    CO2 24 04/23/2019    BUN 25 04/23/2019    CREATININE 1.7 04/23/2019    GFRAA 44 03/05/2019    LABGLOM 37 04/23/2019    LABGLOM 36 03/05/2019    GLUCOSE 112 04/23/2019    CALCIUM 8.5 04/23/2019     Hepatic Function Panel:    Lab Results   Component Value Date    ALKPHOS 65 03/02/2019    ALT 11 03/02/2019    AST 21 03/02/2019    PROT 6.3 03/02/2019    PROT 6.6 01/22/2013    BILITOT 0.3 03/02/2019    BILIDIR 0.2 03/05/2018    IBILI 0.1 03/05/2018    LABALBU 3.6 03/02/2019     Magnesium:    Lab Results   Component Value Date    MG 2.7 03/12/2018     PT/INR:    Lab Results   Component Value Date    PROTIME 12.6 03/02/2018    PROTIME 9.8 06/30/2011    INR 1.09 03/02/2018     Lipids:    Lab Results   Component Value Date    TRIG 89 03/02/2018     03/02/2018    LDLDIRECT 56 03/02/2018       ASSESSMENT AND PLAN:   The patient's condition/symptoms are Stable: No clinical evidence of fluid overload today. Continue current medical regimen without changes at present time. Clinically he looks to be euvolemic. He notes dizziness has improved with adjustment of medications. He is not orthostatic today. Advised slow to stand and use of a cane. ACE/ ARB Yes    Vasotec 5 mg daily -   His creatinine has increased to 1.7 and K is 5.1 would recommend to hold the Vasotec. There is a history of having only one kidney   Can this be changed to ARNI No    Persistently symptomatic AA with NYHA class III-IV  No  In addition to ACE/ ARB/ARNI:   hydralazine + Nitrate _________    Loop Diuretic No    NYHA class II-IV with eGFR >30/mil/min/173.m2 and K <5.0 mEq/l   mineralcorctcoid receptor antagonist (spiroalacotne ( aldactone)/ eplerone) in addition to ACE or ARB and in conjunction with B blocker  No     K is 5.1      B-blocker Yes  He had intolerance to Coreg- c/o severe dizziness with it- now on metoprolol bid     HR greater than 70 with patient with LVEF  < 35 NA  Able to use Ivabradine No      Diagnosis Orders   1. Ischemic cardiomyopathy     2. Essential hypertension         Plan:  · all  HF Zones reviewed. · Daily weights  · Fluid restriction of 2 Liters per day  · Limit sodium in diet to around 8552-6140 mg/day- he has not been following low salt-   · Monitor BP   · Activity as tolerated   · Continue present medical management:   · To follow up with Dr. 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 - advised to use cane for balance.     · Follow up with Dr. Aishwarya Noyola on 0/14/2019  · To bring in all bottles of medications to office     Patient was instructed to call the [facility] for changes in the following symptoms:    Weight gain of 3 pounds in 1 day or 5 pounds in 1 week   Increased shortness of breath   Shortness of breath while laying down   Cough   Chest pain   Swelling in feet, ankles or legs   Tenderness or bloating in the abdomen   Fatigue    Decreased appetite or nausea    Confusion      to follow up in 3 months or sooner if needed     Patient given educational materials - see patient instructions. We discussed the importance of weighing oneself and recording daily. We also discussed the importance of a lowsodium diet, higher sodium foods to avoid and better low sodium food options. Discussed use, benefit, and side effects of prescribed medications. All patient questions answered. Patient verbalizes understanding of plan of care using teach back method, and is agreeable to the treatment plan.        Electronicallysigned by SHAAN Ahumada CNP on 5/6/2019 at 10:21 AM

## 2019-05-08 ENCOUNTER — CARE COORDINATION (OUTPATIENT)
Dept: CASE MANAGEMENT | Age: 84
End: 2019-05-08

## 2019-05-08 NOTE — CARE COORDINATION
[facility] Transitions Follow Up Call    2019    Patient: Curt Banda  Patient : 1935   MRN: <[id number]>  Reason for Admission:   Discharge Date: 3/5/19 RARS: Readmission Risk Score: 24         Spoke with: Patients daughter     Care Transitions Subsequent and Final Call    Subsequent and Final Calls  Do you have any ongoing symptoms?:  No  Have your medications changed?:  Yes  Patient Reports:  Daughter said \"they adjusted his blood pressure medicine\" and he is doing much better since then   Do you have any questions related to your medications?:  No  Do you have any needs or concerns that I can assist you with?:  No  Identified Barriers:  None  Care Transitions Interventions  Other Interventions: Follow Up : Spoke with daughter who was very brief , said that her dad is doing much better, they adjusted his blood pressure medicine and he has not had as much dizziness or blood pressure getting too low. Daughter said they have everything they need and she has no questions for me today.      Danita Santacruz RN, BSN, [facility]   Care Transition Coordinator  [phone]    Future Appointments   Date Time Provider Cher Michel   2019  9:30 AM Dayanara Encinas MD [facility]   2019 10:00 AM SCHEDULE, [facility] HRT FAIL [facility]       Sandi Luna RN

## 2019-05-10 ENCOUNTER — CARE COORDINATION (OUTPATIENT)
Dept: CARE COORDINATION | Age: 84
End: 2019-05-10

## 2019-05-10 NOTE — CARE COORDINATION
[facility] Transitions Follow Up Call    5/10/2019    Patient: Kirill Walker  Patient : 1935   MRN: [id number]  Reason for Admission:   Discharge Date: 3/5/19 RARS: Readmission Risk Score: 24         Spoke with: Daughter. Attempted to contact patient for follow up BPCI-A transition call. Spoke to daughter and she was very upset that she was just called 2 days ago. Will reschedule out a few weeks. Care Transitions Subsequent and Final Call    Subsequent and Final Calls  Care Transitions Interventions  Other Interventions:             Follow Up  Future Appointments   Date Time Provider Cher Michel   2019  9:30 AM Wm Burnette MD [facility]   2019 10:00 AM SCHEDULE, [facility]       Flora Durand LPN

## 2019-05-14 ENCOUNTER — OFFICE VISIT (OUTPATIENT)
Dept: CARDIOLOGY CLINIC | Age: 84
End: 2019-05-14
Payer: MEDICARE

## 2019-05-14 VITALS
DIASTOLIC BLOOD PRESSURE: 70 MMHG | WEIGHT: 198.6 LBS | HEIGHT: 68 IN | HEART RATE: 80 BPM | SYSTOLIC BLOOD PRESSURE: 110 MMHG | BODY MASS INDEX: 30.1 KG/M2

## 2019-05-14 DIAGNOSIS — I25.5 ISCHEMIC CARDIOMYOPATHY: ICD-10-CM

## 2019-05-14 DIAGNOSIS — I50.43 SYSTOLIC AND DIASTOLIC CHF, ACUTE ON CHRONIC (HCC): ICD-10-CM

## 2019-05-14 DIAGNOSIS — I50.42 CHRONIC COMBINED SYSTOLIC AND DIASTOLIC HEART FAILURE (HCC): ICD-10-CM

## 2019-05-14 DIAGNOSIS — I10 ESSENTIAL HYPERTENSION: Primary | Chronic | ICD-10-CM

## 2019-05-14 DIAGNOSIS — G93.41 METABOLIC ENCEPHALOPATHY: ICD-10-CM

## 2019-05-14 DIAGNOSIS — E11.52 TYPE 2 DIABETES MELLITUS WITH DIABETIC PERIPHERAL ANGIOPATHY AND GANGRENE, WITHOUT LONG-TERM CURRENT USE OF INSULIN (HCC): ICD-10-CM

## 2019-05-14 DIAGNOSIS — R41.89 IMPAIRED COGNITION: ICD-10-CM

## 2019-05-14 DIAGNOSIS — I25.10 ASCVD (ARTERIOSCLEROTIC CARDIOVASCULAR DISEASE): ICD-10-CM

## 2019-05-14 PROCEDURE — 4040F PNEUMOC VAC/ADMIN/RCVD: CPT | Performed by: INTERNAL MEDICINE

## 2019-05-14 PROCEDURE — 1123F ACP DISCUSS/DSCN MKR DOCD: CPT | Performed by: INTERNAL MEDICINE

## 2019-05-14 PROCEDURE — 1036F TOBACCO NON-USER: CPT | Performed by: INTERNAL MEDICINE

## 2019-05-14 PROCEDURE — G8427 DOCREV CUR MEDS BY ELIG CLIN: HCPCS | Performed by: INTERNAL MEDICINE

## 2019-05-14 PROCEDURE — G8417 CALC BMI ABV UP PARAM F/U: HCPCS | Performed by: INTERNAL MEDICINE

## 2019-05-14 PROCEDURE — G8598 ASA/ANTIPLAT THER USED: HCPCS | Performed by: INTERNAL MEDICINE

## 2019-05-14 PROCEDURE — 99214 OFFICE O/P EST MOD 30 MIN: CPT | Performed by: INTERNAL MEDICINE

## 2019-05-14 NOTE — PROGRESS NOTES
CARDIOLOGY  NOTE    Chief Complaint: CAD /Ischemic cardiomyopathy    HPI:   Almer Skiff is a 81y.o. year old who has history as noted below. He comes in for follow up . The dizziness is better and BP is better after his Vasotec dose was reduced by Avonne David . He is feeling better . Floyd Raymond He is here with his daughter who reports that today he has been compliant with medication. Present. She admitted in the hospital in March 2019 when he was found to have significant native vessel disease. Advised to undergo CABG but after extensive discussion medical management was opted  HE has ischemic cardiomyopathy with ejection fraction of 30%. Floyd Raymond Unfortunately, he also consumes a lot of alcohol    Current Outpatient Medications   Medication Sig Dispense Refill    metoprolol tartrate (LOPRESSOR) 25 MG tablet Take 25 mg by mouth 2 times daily      enalapril (VASOTEC) 5 MG tablet Take 1 tablet by mouth daily 30 tablet 3    clopidogrel (PLAVIX) 75 MG tablet Take 1 tablet by mouth daily 30 tablet 0    isosorbide mononitrate (IMDUR) 30 MG extended release tablet Take 1 tablet by mouth daily 30 tablet 3    nitroGLYCERIN (NITROSTAT) 0.4 MG SL tablet up to max of 3 total doses. If no relief after 1 dose, call 911. 25 tablet 3    atorvastatin (LIPITOR) 40 MG tablet Take 1 tablet by mouth nightly 30 tablet 3    aspirin 81 MG chewable tablet Take 1 tablet by mouth daily 30 tablet 0     No current facility-administered medications for this visit. Allergies:   Patient has no known allergies. Patient History:  Past Medical History:   Diagnosis Date    Colon Cancer 7-24-12    \"Took 1.5 Foot Large Colon\"    Diabetes mellitus ([facility] [address] Dx 1998    Gangrene ([facility] [address] 1998    \"Gangrene Right Foot, Amputated Right Little Toe And Little Bit Of Right Foot\"    H/O percutaneous left heart catheterization 03/02/2019    Severe multivessel disease.    [facility] OTHER MEDICAL     Primary Care Physician Is Dr. Joseph Baer Tiffany Mosquera Hypertension     Malignant neoplasm of kidney ([facility] [address] Right    MI (myocardial infarction) ([facility] [address] 2019    NSTEMI    Nerve damage     \"Both Feet\"    Renal mass     right    UTI (urinary tract infection)     Last One 2-13    Wears glasses     \"Just To Read\"     Past Surgical History:   Procedure Laterality Date    COLECTOMY  12    \"Took 1.5 Foot Large Colon\", Colon Cancer    COLON SURGERY      2012    COLONOSCOPY      EYE SURGERY Bilateral 's    Cataracts With Lens Implants Eyes    FOOT SURGERY Right     \"Gangrene Right Foot, Amputated Right Little Toe, Little Bit Right Foot\"    OTHER SURGICAL HISTORY Right 2013    right robotic assist radical nephrectomy    TOE AMPUTATION      little toes , bilaterally     TOE AMPUTATION Left     \"Little Toe\"   [facility]     History reviewed. No pertinent family history. Social History     Tobacco Use    Smoking status: Former Smoker     Packs/day: 1.50     Years: 35.00     Pack years: 52.50     Last attempt to quit: 1977     Years since quittin.7    Smokeless tobacco: Never Used   Substance Use Topics    Alcohol use: Yes     Comment: once in awhile         Review of Systems:   · Constitutional: No Fever or Weight Loss   · Eyes: No Decreased Vision  · ENT: No Headaches, Hearing Loss or Vertigo  · Cardiovascular: as per note above   · Respiratory: No cough or wheezing and as per note above.    · Gastrointestinal: No abdominal pain, appetite loss, blood in stools, constipation, diarrhea or heartburn  · Genitourinary: No dysuria, trouble voiding, or hematuria  · Musculoskeletal:  None  · Integumentary: No rash or pruritis  · Neurological: No TIA or stroke symptoms  · Psychiatric: No anxiety or depression  · Endocrine: No malaise, fatigue or temperature intolerance  · Hematologic/Lymphatic: No bleeding problems, blood clots or swollen lymph nodes  · Allergic/Immunologic: No nasal congestion TRIG 89 03/02/2018     03/02/2018    LDLDIRECT 56 03/02/2018     Lab Results   Component Value Date    ALT 11 03/02/2019    AST 21 03/02/2019     TSH: No results found for: TSH  Lab Results   Component Value Date    AST 21 03/02/2019    ALT 11 03/02/2019    BILIDIR 0.2 03/05/2018    BILITOT 0.3 03/02/2019    ALKPHOS 65 03/02/2019     Lab Results   Component Value Date    PROBNP 7,281 (H) 03/02/2019    PROBNP 26,583 (H) 03/02/2018    PROBNP 3,614 (H) 03/01/2018     Lab Results   Component Value Date    LABA1C 5.5 03/02/2018    LABA1C 10.9 (H) 05/22/2013     Lab Results   Component Value Date    WBC 6.6 03/05/2019    HGB 8.5 (L) 03/05/2019    HCT 31.1 (L) 03/05/2019     03/05/2019     Echo  3/2/19   Summary   Left ventricular function is moderately abnormal, EF is estimated at 35-40   %. Grade II diastolic dysfunction. Mild left ventricular hypertrophy. Apical wall is akinetic , Thrombus cannot be ruled out   Structurally normal mitral valve. Mild to Moderate mitral regurgitation is present. Tricuspid valve is structurally normal.   RVSP is 30 mmHg. No evidence of pericardial effusion. No evidence of pleural effusion. Cath 3/2/19   Procedure Summary   Indication: NSTEMI   Access: Femoral   1. LAD is  in mid segment   2. Circ has 90 % proximal lesion   3. RCA is    4. RAmus is patent   5. LVEDP is 16 mmHG      Angiographic Findings    Diagnostic Findings    Cardiac Arteries and Lesion Findings   LMCA: Normal (no stenosis %) and No Angiographicalyl Significant Disease. LAD: Abnormal. in mid segment, Distal LAD fills by Collaterals from Diag  and Circ    LCx: Diffuse irregularity and Abnormal.Proximal segment has 90 % lesion , OM  has another 70 % MID segment lesion    RCA: Abnormal and Chronic occlusion.  in proximal segment , distal RCA is  filled by collaterals from left side    Ramus: No Angiographicalyl Significant Disease and Mild Lumen  Irregularity. gives Collaterals weeks (around 6/25/2019). Please note this report has been partially produced using speech recognition software and may contain errors related to that system including errors in grammar, punctuation, and spelling, as well as words and phrases that may be inappropriate.  If there are any questions or concerns please feel free to contact the dictating provider for clarification.

## 2019-05-14 NOTE — ASSESSMENT & PLAN NOTE
Multivessel disease with LAD  and circumflex diffusely diseased RCA also has 99% stenosis. Medical management was opted decided not to pursue it CABG will continue medical management for now, although if needed. If he is symptomatic we can plan LAD  and circumflex intervention  .  Continue antiplatelet therapy

## 2019-05-21 ENCOUNTER — CARE COORDINATION (OUTPATIENT)
Dept: CASE MANAGEMENT | Age: 84
End: 2019-05-21

## 2019-05-21 NOTE — CARE COORDINATION
[facility] Transitions Follow Up Call    2019    Patient: Jerson Reich  Patient : 1935   MRN: <[id number]>  Reason for Admission:   Discharge Date: 3/5/19 RARS: Readmission Risk Score: 24         Spoke with: Attempted to contact patient for follow up BPCI-A transition call. Unable to reach, left voicemail with contact information for patient to call back. Care Transitions Subsequent and Final Call    Subsequent and Final Calls  Care Transitions Interventions  Other Interventions:             Follow Up  Future Appointments   Date Time Provider Cher Michel   2019 10:15 AM SCHEDULE, [facility] ECHO [facility]   2019  9:30 AM Chuck Llamas MD [facility]   2019 10:00 AM SCHEDULE, [facility] HRT FAIL [facility]       Jill Gowers, RN BSN  Care Transitions Coordinator

## 2019-06-17 ENCOUNTER — PROCEDURE VISIT (OUTPATIENT)
Dept: CARDIOLOGY CLINIC | Age: 84
End: 2019-06-17
Payer: MEDICARE

## 2019-06-17 DIAGNOSIS — I50.42 CHRONIC COMBINED SYSTOLIC AND DIASTOLIC HEART FAILURE (HCC): Primary | ICD-10-CM

## 2019-06-17 DIAGNOSIS — R41.89 IMPAIRED COGNITION: ICD-10-CM

## 2019-06-17 DIAGNOSIS — I50.43 SYSTOLIC AND DIASTOLIC CHF, ACUTE ON CHRONIC (HCC): ICD-10-CM

## 2019-06-17 DIAGNOSIS — G93.41 METABOLIC ENCEPHALOPATHY: ICD-10-CM

## 2019-06-17 DIAGNOSIS — I10 ESSENTIAL HYPERTENSION: Chronic | ICD-10-CM

## 2019-06-17 DIAGNOSIS — E11.52 TYPE 2 DIABETES MELLITUS WITH DIABETIC PERIPHERAL ANGIOPATHY AND GANGRENE, WITHOUT LONG-TERM CURRENT USE OF INSULIN (HCC): ICD-10-CM

## 2019-06-17 DIAGNOSIS — I25.5 ISCHEMIC CARDIOMYOPATHY: ICD-10-CM

## 2019-06-17 LAB
LV EF: 33 %
LVEF MODALITY: NORMAL

## 2019-06-17 PROCEDURE — 93306 TTE W/DOPPLER COMPLETE: CPT | Performed by: INTERNAL MEDICINE

## 2019-06-18 ENCOUNTER — TELEPHONE (OUTPATIENT)
Dept: CARDIOLOGY CLINIC | Age: 84
End: 2019-06-18

## 2019-06-18 NOTE — TELEPHONE ENCOUNTER
Notified Elizabet of results. Pt has F/U 7-1-19         Summary   Limited study to evaluate for LV clot x Definity used.   Ejection fraction is visually estimated at 30-35%.   No filling defect , No apical thrombus.

## 2019-06-28 RX ORDER — ISOSORBIDE MONONITRATE 30 MG/1
30 TABLET, EXTENDED RELEASE ORAL DAILY
Qty: 30 TABLET | Refills: 5 | Status: SHIPPED | OUTPATIENT
Start: 2019-06-28 | End: 2019-12-07 | Stop reason: ALTCHOICE

## 2019-08-04 ENCOUNTER — APPOINTMENT (OUTPATIENT)
Dept: GENERAL RADIOLOGY | Age: 84
End: 2019-08-04
Payer: MEDICARE

## 2019-08-04 ENCOUNTER — HOSPITAL ENCOUNTER (EMERGENCY)
Age: 84
Discharge: HOME | End: 2019-08-04
Payer: MEDICARE

## 2019-08-04 VITALS
TEMPERATURE: 97.9 F | RESPIRATION RATE: 18 BRPM | HEIGHT: 68 IN | HEART RATE: 99 BPM | OXYGEN SATURATION: 99 % | BODY MASS INDEX: 29.25 KG/M2 | DIASTOLIC BLOOD PRESSURE: 65 MMHG | SYSTOLIC BLOOD PRESSURE: 121 MMHG | WEIGHT: 193 LBS

## 2019-08-04 DIAGNOSIS — M54.50 ACUTE RIGHT-SIDED LOW BACK PAIN WITHOUT SCIATICA: Primary | ICD-10-CM

## 2019-08-04 PROCEDURE — 72100 X-RAY EXAM L-S SPINE 2/3 VWS: CPT

## 2019-08-04 PROCEDURE — 99283 EMERGENCY DEPT VISIT LOW MDM: CPT

## 2019-08-04 PROCEDURE — 6370000000 HC RX 637 (ALT 250 FOR IP): Performed by: PHYSICIAN ASSISTANT

## 2019-08-04 RX ORDER — LIDOCAINE 50 MG/G
1 PATCH TOPICAL DAILY
Qty: 15 PATCH | Refills: 0 | Status: SHIPPED | OUTPATIENT
Start: 2019-08-04 | End: 2019-09-03

## 2019-08-04 RX ORDER — TRAMADOL HYDROCHLORIDE 50 MG/1
50 TABLET ORAL EVERY 6 HOURS PRN
Qty: 10 TABLET | Refills: 0 | Status: SHIPPED | OUTPATIENT
Start: 2019-08-04 | End: 2019-08-09

## 2019-08-04 RX ORDER — TRAMADOL HYDROCHLORIDE 50 MG/1
50 TABLET ORAL ONCE
Status: COMPLETED | OUTPATIENT
Start: 2019-08-04 | End: 2019-08-04

## 2019-08-04 RX ORDER — CYCLOBENZAPRINE HCL 10 MG
10 TABLET ORAL 3 TIMES DAILY PRN
Qty: 21 TABLET | Refills: 0 | Status: SHIPPED | OUTPATIENT
Start: 2019-08-04 | End: 2019-08-11

## 2019-08-04 RX ADMIN — TRAMADOL HYDROCHLORIDE 50 MG: 50 TABLET, FILM COATED ORAL at 08:54

## 2019-08-04 ASSESSMENT — PAIN DESCRIPTION - ORIENTATION: ORIENTATION: RIGHT;LOWER

## 2019-08-04 ASSESSMENT — PAIN SCALES - GENERAL
PAINLEVEL_OUTOF10: 9
PAINLEVEL_OUTOF10: 8

## 2019-08-04 ASSESSMENT — PAIN DESCRIPTION - PAIN TYPE: TYPE: ACUTE PAIN

## 2019-08-04 ASSESSMENT — PAIN DESCRIPTION - LOCATION: LOCATION: BACK
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
## 2019-11-05 ENCOUNTER — OFFICE VISIT (OUTPATIENT)
Dept: CARDIOLOGY CLINIC | Age: 84
End: 2019-11-05
Payer: MEDICARE

## 2019-11-05 VITALS
DIASTOLIC BLOOD PRESSURE: 60 MMHG | HEIGHT: 68 IN | HEART RATE: 62 BPM | BODY MASS INDEX: 28.64 KG/M2 | WEIGHT: 189 LBS | SYSTOLIC BLOOD PRESSURE: 114 MMHG

## 2019-11-05 DIAGNOSIS — I21.4 NSTEMI (NON-ST ELEVATED MYOCARDIAL INFARCTION) (HCC): ICD-10-CM

## 2019-11-05 DIAGNOSIS — I50.42 CHRONIC COMBINED SYSTOLIC AND DIASTOLIC HEART FAILURE (HCC): ICD-10-CM

## 2019-11-05 DIAGNOSIS — I25.5 ISCHEMIC CARDIOMYOPATHY: ICD-10-CM

## 2019-11-05 DIAGNOSIS — I50.43 SYSTOLIC AND DIASTOLIC CHF, ACUTE ON CHRONIC (HCC): ICD-10-CM

## 2019-11-05 DIAGNOSIS — E11.52 TYPE 2 DIABETES MELLITUS WITH DIABETIC PERIPHERAL ANGIOPATHY AND GANGRENE, WITHOUT LONG-TERM CURRENT USE OF INSULIN (HCC): ICD-10-CM

## 2019-11-05 DIAGNOSIS — I10 ESSENTIAL HYPERTENSION: Chronic | ICD-10-CM

## 2019-11-05 DIAGNOSIS — I25.10 ASCVD (ARTERIOSCLEROTIC CARDIOVASCULAR DISEASE): Primary | ICD-10-CM

## 2019-11-05 PROCEDURE — 99214 OFFICE O/P EST MOD 30 MIN: CPT | Performed by: INTERNAL MEDICINE

## 2019-11-05 PROCEDURE — 1123F ACP DISCUSS/DSCN MKR DOCD: CPT | Performed by: INTERNAL MEDICINE

## 2019-11-05 PROCEDURE — G8484 FLU IMMUNIZE NO ADMIN: HCPCS | Performed by: INTERNAL MEDICINE

## 2019-11-05 PROCEDURE — G8598 ASA/ANTIPLAT THER USED: HCPCS | Performed by: INTERNAL MEDICINE

## 2019-11-05 PROCEDURE — G8427 DOCREV CUR MEDS BY ELIG CLIN: HCPCS | Performed by: INTERNAL MEDICINE

## 2019-11-05 PROCEDURE — 1036F TOBACCO NON-USER: CPT | Performed by: INTERNAL MEDICINE

## 2019-11-05 PROCEDURE — G8417 CALC BMI ABV UP PARAM F/U: HCPCS | Performed by: INTERNAL MEDICINE

## 2019-11-05 PROCEDURE — 4040F PNEUMOC VAC/ADMIN/RCVD: CPT | Performed by: INTERNAL MEDICINE

## 2019-11-05 RX ORDER — RANOLAZINE 500 MG/1
500 TABLET, EXTENDED RELEASE ORAL 2 TIMES DAILY
Qty: 60 TABLET | Refills: 3 | Status: SHIPPED | OUTPATIENT
Start: 2019-11-05 | End: 2019-12-07 | Stop reason: ALTCHOICE

## 2019-11-05 RX ORDER — METOPROLOL SUCCINATE 50 MG/1
50 TABLET, EXTENDED RELEASE ORAL DAILY
Qty: 30 TABLET | Refills: 3 | Status: SHIPPED | OUTPATIENT
Start: 2019-11-05 | End: 2019-12-07 | Stop reason: ALTCHOICE

## 2019-11-14 ENCOUNTER — APPOINTMENT (OUTPATIENT)
Dept: GENERAL RADIOLOGY | Age: 84
DRG: 291 | End: 2019-11-14
Payer: MEDICARE

## 2019-11-14 ENCOUNTER — HOSPITAL ENCOUNTER (INPATIENT)
Age: 84
LOS: 8 days | Discharge: NURSING, REHAB OR LONG-TERM CARE | DRG: 291 | End: 2019-11-22
Attending: EMERGENCY MEDICINE | Admitting: INTERNAL MEDICINE
Payer: MEDICARE

## 2019-11-14 ENCOUNTER — APPOINTMENT (OUTPATIENT)
Dept: CT IMAGING | Age: 84
DRG: 291 | End: 2019-11-14
Payer: MEDICARE

## 2019-11-14 DIAGNOSIS — R77.8 TROPONIN LEVEL ELEVATED: ICD-10-CM

## 2019-11-14 DIAGNOSIS — R29.898 LEG WEAKNESS, BILATERAL: Primary | ICD-10-CM

## 2019-11-14 DIAGNOSIS — D64.9 ANEMIA, UNSPECIFIED TYPE: ICD-10-CM

## 2019-11-14 DIAGNOSIS — R05.9 COUGH: ICD-10-CM

## 2019-11-14 LAB
ALBUMIN SERPL-MCNC: 3.2 GM/DL (ref 3.4–5)
ALP BLD-CCNC: 108 IU/L (ref 40–129)
ALT SERPL-CCNC: 10 U/L (ref 10–40)
ANION GAP SERPL CALCULATED.3IONS-SCNC: 14 MMOL/L (ref 4–16)
ANISOCYTOSIS: ABNORMAL
APTT: 47.2 SECONDS (ref 25.1–37.1)
AST SERPL-CCNC: 24 IU/L (ref 15–37)
BASOPHILS ABSOLUTE: 0 K/CU MM
BASOPHILS RELATIVE PERCENT: 0.6 % (ref 0–1)
BILIRUB SERPL-MCNC: 0.3 MG/DL (ref 0–1)
BUN BLDV-MCNC: 26 MG/DL (ref 6–23)
BURR CELLS: ABNORMAL
CALCIUM SERPL-MCNC: 8.4 MG/DL (ref 8.3–10.6)
CHLORIDE BLD-SCNC: 102 MMOL/L (ref 99–110)
CO2: 20 MMOL/L (ref 21–32)
CREAT SERPL-MCNC: 1.5 MG/DL (ref 0.9–1.3)
DIFFERENTIAL TYPE: ABNORMAL
EOSINOPHILS ABSOLUTE: 0.1 K/CU MM
EOSINOPHILS RELATIVE PERCENT: 1.8 % (ref 0–3)
GFR AFRICAN AMERICAN: 54 ML/MIN/1.73M2
GFR NON-AFRICAN AMERICAN: 45 ML/MIN/1.73M2
GLUCOSE BLD-MCNC: 84 MG/DL (ref 70–99)
HCT VFR BLD CALC: 27.2 % (ref 42–52)
HEMOGLOBIN: ABNORMAL GM/DL (ref 13.5–18)
HYPOCHROMIA: ABNORMAL
IMMATURE NEUTROPHIL %: 0.6 % (ref 0–0.43)
INR BLD: 1.19 INDEX
LYMPHOCYTES ABSOLUTE: 0.9 K/CU MM
LYMPHOCYTES RELATIVE PERCENT: 18.3 % (ref 24–44)
MAGNESIUM: 2 MG/DL (ref 1.8–2.4)
MCH RBC QN AUTO: 15.5 PG (ref 27–31)
MCHC RBC AUTO-ENTMCNC: 24.3 % (ref 32–36)
MCV RBC AUTO: 64 FL (ref 78–100)
MICROCYTES: ABNORMAL
MONOCYTES ABSOLUTE: 0.5 K/CU MM
MONOCYTES RELATIVE PERCENT: 10.3 % (ref 0–4)
OVALOCYTES: ABNORMAL
PDW BLD-RTO: 21 % (ref 11.7–14.9)
PLATELET # BLD: 244 K/CU MM (ref 140–440)
PLT MORPHOLOGY: ABNORMAL
POTASSIUM SERPL-SCNC: 4.7 MMOL/L (ref 3.5–5.1)
PRO-BNP: 9746 PG/ML
PROTHROMBIN TIME: 13.6 SECONDS (ref 11.7–14.5)
RBC # BLD: 4.25 M/CU MM (ref 4.6–6.2)
RBC # BLD: ABNORMAL 10*6/UL
SEGMENTED NEUTROPHILS ABSOLUTE COUNT: 3.4 K/CU MM
SEGMENTED NEUTROPHILS RELATIVE PERCENT: 68.4 % (ref 36–66)
SODIUM BLD-SCNC: 136 MMOL/L (ref 135–145)
TEAR DROP CELLS: ABNORMAL
TOTAL CK: 70 IU/L (ref 38–174)
TOTAL IMMATURE NEUTOROPHIL: 0.03 K/CU MM
TOTAL PROTEIN: 5.8 GM/DL (ref 6.4–8.2)
TROPONIN T: 0.01 NG/ML
TSH HIGH SENSITIVITY: 3.37 UIU/ML (ref 0.27–4.2)
WBC # BLD: 5 K/CU MM (ref 4–10.5)

## 2019-11-14 PROCEDURE — 36430 TRANSFUSION BLD/BLD COMPNT: CPT

## 2019-11-14 PROCEDURE — 94640 AIRWAY INHALATION TREATMENT: CPT

## 2019-11-14 PROCEDURE — 86901 BLOOD TYPING SEROLOGIC RH(D): CPT

## 2019-11-14 PROCEDURE — 85610 PROTHROMBIN TIME: CPT

## 2019-11-14 PROCEDURE — 84443 ASSAY THYROID STIM HORMONE: CPT

## 2019-11-14 PROCEDURE — 80053 COMPREHEN METABOLIC PANEL: CPT

## 2019-11-14 PROCEDURE — 2580000003 HC RX 258: Performed by: NURSE PRACTITIONER

## 2019-11-14 PROCEDURE — 99285 EMERGENCY DEPT VISIT HI MDM: CPT

## 2019-11-14 PROCEDURE — 83880 ASSAY OF NATRIURETIC PEPTIDE: CPT

## 2019-11-14 PROCEDURE — 86900 BLOOD TYPING SEROLOGIC ABO: CPT

## 2019-11-14 PROCEDURE — 93005 ELECTROCARDIOGRAM TRACING: CPT | Performed by: EMERGENCY MEDICINE

## 2019-11-14 PROCEDURE — 84484 ASSAY OF TROPONIN QUANT: CPT

## 2019-11-14 PROCEDURE — 2060000000 HC ICU INTERMEDIATE R&B

## 2019-11-14 PROCEDURE — 2580000003 HC RX 258: Performed by: EMERGENCY MEDICINE

## 2019-11-14 PROCEDURE — 70450 CT HEAD/BRAIN W/O DYE: CPT

## 2019-11-14 PROCEDURE — 85025 COMPLETE CBC W/AUTO DIFF WBC: CPT

## 2019-11-14 PROCEDURE — 86850 RBC ANTIBODY SCREEN: CPT

## 2019-11-14 PROCEDURE — 6370000000 HC RX 637 (ALT 250 FOR IP): Performed by: NURSE PRACTITIONER

## 2019-11-14 PROCEDURE — P9016 RBC LEUKOCYTES REDUCED: HCPCS

## 2019-11-14 PROCEDURE — 71045 X-RAY EXAM CHEST 1 VIEW: CPT

## 2019-11-14 PROCEDURE — 6370000000 HC RX 637 (ALT 250 FOR IP): Performed by: EMERGENCY MEDICINE

## 2019-11-14 PROCEDURE — 83735 ASSAY OF MAGNESIUM: CPT

## 2019-11-14 PROCEDURE — 86922 COMPATIBILITY TEST ANTIGLOB: CPT

## 2019-11-14 PROCEDURE — 82550 ASSAY OF CK (CPK): CPT

## 2019-11-14 PROCEDURE — 85730 THROMBOPLASTIN TIME PARTIAL: CPT

## 2019-11-14 RX ORDER — METOPROLOL SUCCINATE 50 MG/1
50 TABLET, EXTENDED RELEASE ORAL DAILY
Status: DISCONTINUED | OUTPATIENT
Start: 2019-11-15 | End: 2019-11-18

## 2019-11-14 RX ORDER — SODIUM CHLORIDE 0.9 % (FLUSH) 0.9 %
10 SYRINGE (ML) INJECTION EVERY 12 HOURS SCHEDULED
Status: DISCONTINUED | OUTPATIENT
Start: 2019-11-14 | End: 2019-11-15 | Stop reason: SDUPTHER

## 2019-11-14 RX ORDER — IPRATROPIUM BROMIDE AND ALBUTEROL SULFATE 2.5; .5 MG/3ML; MG/3ML
1 SOLUTION RESPIRATORY (INHALATION)
Status: DISCONTINUED | OUTPATIENT
Start: 2019-11-15 | End: 2019-11-22 | Stop reason: HOSPADM

## 2019-11-14 RX ORDER — ACETAMINOPHEN 325 MG/1
650 TABLET ORAL EVERY 4 HOURS PRN
Status: DISCONTINUED | OUTPATIENT
Start: 2019-11-14 | End: 2019-11-22 | Stop reason: HOSPADM

## 2019-11-14 RX ORDER — ATORVASTATIN CALCIUM 40 MG/1
40 TABLET, FILM COATED ORAL NIGHTLY
Status: DISCONTINUED | OUTPATIENT
Start: 2019-11-14 | End: 2019-11-22 | Stop reason: HOSPADM

## 2019-11-14 RX ORDER — SODIUM CHLORIDE 0.9 % (FLUSH) 0.9 %
10 SYRINGE (ML) INJECTION PRN
Status: DISCONTINUED | OUTPATIENT
Start: 2019-11-14 | End: 2019-11-15 | Stop reason: SDUPTHER

## 2019-11-14 RX ORDER — RANOLAZINE 500 MG/1
500 TABLET, EXTENDED RELEASE ORAL 2 TIMES DAILY
Status: DISCONTINUED | OUTPATIENT
Start: 2019-11-14 | End: 2019-11-22 | Stop reason: HOSPADM

## 2019-11-14 RX ORDER — ISOSORBIDE MONONITRATE 30 MG/1
30 TABLET, EXTENDED RELEASE ORAL DAILY
Status: DISCONTINUED | OUTPATIENT
Start: 2019-11-15 | End: 2019-11-18

## 2019-11-14 RX ORDER — IPRATROPIUM BROMIDE AND ALBUTEROL SULFATE 2.5; .5 MG/3ML; MG/3ML
1 SOLUTION RESPIRATORY (INHALATION) ONCE
Status: COMPLETED | OUTPATIENT
Start: 2019-11-14 | End: 2019-11-14

## 2019-11-14 RX ORDER — BENZONATATE 100 MG/1
100 CAPSULE ORAL ONCE
Status: COMPLETED | OUTPATIENT
Start: 2019-11-14 | End: 2019-11-14

## 2019-11-14 RX ORDER — ONDANSETRON 2 MG/ML
4 INJECTION INTRAMUSCULAR; INTRAVENOUS EVERY 6 HOURS PRN
Status: DISCONTINUED | OUTPATIENT
Start: 2019-11-14 | End: 2019-11-22 | Stop reason: HOSPADM

## 2019-11-14 RX ORDER — FUROSEMIDE 10 MG/ML
20 INJECTION INTRAMUSCULAR; INTRAVENOUS 2 TIMES DAILY
Status: DISCONTINUED | OUTPATIENT
Start: 2019-11-15 | End: 2019-11-18

## 2019-11-14 RX ORDER — NITROGLYCERIN 0.4 MG/1
0.4 TABLET SUBLINGUAL EVERY 5 MIN PRN
Status: DISCONTINUED | OUTPATIENT
Start: 2019-11-14 | End: 2019-11-22 | Stop reason: HOSPADM

## 2019-11-14 RX ORDER — ENALAPRIL MALEATE 5 MG/1
5 TABLET ORAL DAILY
Status: DISCONTINUED | OUTPATIENT
Start: 2019-11-15 | End: 2019-11-22 | Stop reason: HOSPADM

## 2019-11-14 RX ORDER — 0.9 % SODIUM CHLORIDE 0.9 %
250 INTRAVENOUS SOLUTION INTRAVENOUS ONCE
Status: COMPLETED | OUTPATIENT
Start: 2019-11-14 | End: 2019-11-15

## 2019-11-14 RX ADMIN — ATORVASTATIN CALCIUM 40 MG: 40 TABLET, FILM COATED ORAL at 23:14

## 2019-11-14 RX ADMIN — IPRATROPIUM BROMIDE AND ALBUTEROL SULFATE 1 AMPULE: .5; 3 SOLUTION RESPIRATORY (INHALATION) at 19:20

## 2019-11-14 RX ADMIN — Medication 10 ML: at 23:07

## 2019-11-14 RX ADMIN — SODIUM CHLORIDE 250 ML: 9 INJECTION, SOLUTION INTRAVENOUS at 22:53

## 2019-11-14 RX ADMIN — RANOLAZINE 500 MG: 500 TABLET, FILM COATED, EXTENDED RELEASE ORAL at 23:14

## 2019-11-14 RX ADMIN — BENZONATATE 100 MG: 100 CAPSULE ORAL at 19:33

## 2019-11-14 ASSESSMENT — ENCOUNTER SYMPTOMS
ALLERGIC/IMMUNOLOGIC NEGATIVE: 1
COUGH: 1
EYES NEGATIVE: 1
SHORTNESS OF BREATH: 1
GASTROINTESTINAL NEGATIVE: 1

## 2019-11-14 ASSESSMENT — PAIN SCALES - GENERAL: PAINLEVEL_OUTOF10: 0

## 2019-11-15 LAB
ABO/RH: NORMAL
ANION GAP SERPL CALCULATED.3IONS-SCNC: 9 MMOL/L (ref 4–16)
ANTIBODY SCREEN: NEGATIVE
BACTERIA: ABNORMAL /HPF
BASOPHILS ABSOLUTE: 0 K/CU MM
BASOPHILS RELATIVE PERCENT: 0.7 % (ref 0–1)
BILIRUBIN URINE: NEGATIVE MG/DL
BLOOD, URINE: ABNORMAL
BUN BLDV-MCNC: 29 MG/DL (ref 6–23)
BURR CELLS: ABNORMAL
CALCIUM SERPL-MCNC: 8 MG/DL (ref 8.3–10.6)
CHLORIDE BLD-SCNC: 104 MMOL/L (ref 99–110)
CHOLESTEROL: 58 MG/DL
CLARITY: ABNORMAL
CO2: 24 MMOL/L (ref 21–32)
COLOR: YELLOW
COMPONENT: NORMAL
CREAT SERPL-MCNC: 1.5 MG/DL (ref 0.9–1.3)
CROSSMATCH RESULT: NORMAL
DIFFERENTIAL TYPE: ABNORMAL
DIFFERENTIAL TYPE: ABNORMAL
EKG ATRIAL RATE: 59 BPM
EKG DIAGNOSIS: NORMAL
EKG P AXIS: 90 DEGREES
EKG P-R INTERVAL: 178 MS
EKG Q-T INTERVAL: 414 MS
EKG QRS DURATION: 84 MS
EKG QTC CALCULATION (BAZETT): 409 MS
EKG R AXIS: 84 DEGREES
EKG T AXIS: 191 DEGREES
EKG VENTRICULAR RATE: 59 BPM
ELLIPTOCYTES: ABNORMAL
EOSINOPHILS ABSOLUTE: 0.1 K/CU MM
EOSINOPHILS RELATIVE PERCENT: 2.1 % (ref 0–3)
ESTIMATED AVERAGE GLUCOSE: 103 MG/DL
GFR AFRICAN AMERICAN: 54 ML/MIN/1.73M2
GFR NON-AFRICAN AMERICAN: 45 ML/MIN/1.73M2
GLUCOSE BLD-MCNC: 81 MG/DL (ref 70–99)
GLUCOSE, URINE: NEGATIVE MG/DL
HBA1C MFR BLD: 5.2 % (ref 4.2–6.3)
HCT VFR BLD CALC: 29.3 % (ref 42–52)
HDLC SERPL-MCNC: 34 MG/DL
HEMOGLOBIN: 7.5 GM/DL (ref 13.5–18)
HYALINE CASTS: 0 /LPF
IMMATURE NEUTROPHIL %: 0.5 % (ref 0–0.43)
KETONES, URINE: NEGATIVE MG/DL
LDL CHOLESTEROL DIRECT: 16 MG/DL
LEUKOCYTE ESTERASE, URINE: ABNORMAL
LV EF: 33 %
LVEF MODALITY: NORMAL
LYMPHOCYTES ABSOLUTE: 0.9 K/CU MM
LYMPHOCYTES RELATIVE PERCENT: 19.9 % (ref 24–44)
MAGNESIUM: 2 MG/DL (ref 1.8–2.4)
MCH RBC QN AUTO: 17.3 PG (ref 27–31)
MCHC RBC AUTO-ENTMCNC: 25.6 % (ref 32–36)
MCV RBC AUTO: 67.7 FL (ref 78–100)
MONOCYTES ABSOLUTE: 0.6 K/CU MM
MONOCYTES RELATIVE PERCENT: 13.6 % (ref 0–4)
MUCUS: ABNORMAL HPF
NITRITE URINE, QUANTITATIVE: NEGATIVE
NUCLEATED RBC %: 0 %
PDW BLD-RTO: 24.2 % (ref 11.7–14.9)
PH, URINE: 5 (ref 5–8)
PLATELET # BLD: 227 K/CU MM (ref 140–440)
POTASSIUM SERPL-SCNC: 4.5 MMOL/L (ref 3.5–5.1)
PROTEIN UA: NEGATIVE MG/DL
RBC # BLD: 4.33 M/CU MM (ref 4.6–6.2)
RBC # BLD: ABNORMAL 10*6/UL
RBC URINE: 121 /HPF (ref 0–3)
SEGMENTED NEUTROPHILS ABSOLUTE COUNT: 2.7 K/CU MM
SEGMENTED NEUTROPHILS RELATIVE PERCENT: 63.2 % (ref 36–66)
SODIUM BLD-SCNC: 137 MMOL/L (ref 135–145)
SPECIFIC GRAVITY UA: 1.01 (ref 1–1.03)
SQUAMOUS EPITHELIAL: <1 /HPF
STATUS: NORMAL
TOTAL IMMATURE NEUTOROPHIL: 0.02 K/CU MM
TOTAL NUCLEATED RBC: 0 K/CU MM
TRANSFUSION STATUS: NORMAL
TRICHOMONAS: ABNORMAL /HPF
TRIGL SERPL-MCNC: 42 MG/DL
TROPONIN T: <0.01 NG/ML
TROPONIN T: <0.01 NG/ML
UNIT DIVISION: 0
UNIT NUMBER: NORMAL
UROBILINOGEN, URINE: NORMAL MG/DL (ref 0.2–1)
WBC # BLD: 4.3 K/CU MM (ref 4–10.5)
WBC CLUMP: ABNORMAL /HPF
WBC UA: 100 /HPF (ref 0–2)

## 2019-11-15 PROCEDURE — 6370000000 HC RX 637 (ALT 250 FOR IP): Performed by: NURSE PRACTITIONER

## 2019-11-15 PROCEDURE — 2060000000 HC ICU INTERMEDIATE R&B

## 2019-11-15 PROCEDURE — 51798 US URINE CAPACITY MEASURE: CPT

## 2019-11-15 PROCEDURE — 93306 TTE W/DOPPLER COMPLETE: CPT

## 2019-11-15 PROCEDURE — 87086 URINE CULTURE/COLONY COUNT: CPT

## 2019-11-15 PROCEDURE — 85025 COMPLETE CBC W/AUTO DIFF WBC: CPT

## 2019-11-15 PROCEDURE — C9113 INJ PANTOPRAZOLE SODIUM, VIA: HCPCS | Performed by: HOSPITALIST

## 2019-11-15 PROCEDURE — 99223 1ST HOSP IP/OBS HIGH 75: CPT | Performed by: INTERNAL MEDICINE

## 2019-11-15 PROCEDURE — 51702 INSERT TEMP BLADDER CATH: CPT

## 2019-11-15 PROCEDURE — 6360000004 HC RX CONTRAST MEDICATION: Performed by: INTERNAL MEDICINE

## 2019-11-15 PROCEDURE — 94640 AIRWAY INHALATION TREATMENT: CPT

## 2019-11-15 PROCEDURE — 6370000000 HC RX 637 (ALT 250 FOR IP): Performed by: HOSPITALIST

## 2019-11-15 PROCEDURE — 81001 URINALYSIS AUTO W/SCOPE: CPT

## 2019-11-15 PROCEDURE — 83735 ASSAY OF MAGNESIUM: CPT

## 2019-11-15 PROCEDURE — 6360000002 HC RX W HCPCS: Performed by: HOSPITALIST

## 2019-11-15 PROCEDURE — 83036 HEMOGLOBIN GLYCOSYLATED A1C: CPT

## 2019-11-15 PROCEDURE — 83721 ASSAY OF BLOOD LIPOPROTEIN: CPT

## 2019-11-15 PROCEDURE — 6370000000 HC RX 637 (ALT 250 FOR IP): Performed by: INTERNAL MEDICINE

## 2019-11-15 PROCEDURE — 80048 BASIC METABOLIC PNL TOTAL CA: CPT

## 2019-11-15 PROCEDURE — 84484 ASSAY OF TROPONIN QUANT: CPT

## 2019-11-15 PROCEDURE — 6360000002 HC RX W HCPCS: Performed by: NURSE PRACTITIONER

## 2019-11-15 PROCEDURE — 80061 LIPID PANEL: CPT

## 2019-11-15 PROCEDURE — 93010 ELECTROCARDIOGRAM REPORT: CPT | Performed by: INTERNAL MEDICINE

## 2019-11-15 PROCEDURE — 2580000003 HC RX 258: Performed by: HOSPITALIST

## 2019-11-15 PROCEDURE — 36415 COLL VENOUS BLD VENIPUNCTURE: CPT

## 2019-11-15 RX ORDER — LORAZEPAM 1 MG/1
4 TABLET ORAL
Status: DISCONTINUED | OUTPATIENT
Start: 2019-11-15 | End: 2019-11-21

## 2019-11-15 RX ORDER — LORAZEPAM 1 MG/1
2 TABLET ORAL
Status: DISCONTINUED | OUTPATIENT
Start: 2019-11-15 | End: 2019-11-21

## 2019-11-15 RX ORDER — LORAZEPAM 2 MG/ML
4 INJECTION INTRAMUSCULAR
Status: DISCONTINUED | OUTPATIENT
Start: 2019-11-15 | End: 2019-11-21

## 2019-11-15 RX ORDER — THIAMINE MONONITRATE (VIT B1) 100 MG
100 TABLET ORAL DAILY
Status: DISCONTINUED | OUTPATIENT
Start: 2019-11-15 | End: 2019-11-22 | Stop reason: HOSPADM

## 2019-11-15 RX ORDER — LORAZEPAM 1 MG/1
3 TABLET ORAL
Status: DISCONTINUED | OUTPATIENT
Start: 2019-11-15 | End: 2019-11-21

## 2019-11-15 RX ORDER — SODIUM CHLORIDE 0.9 % (FLUSH) 0.9 %
10 SYRINGE (ML) INJECTION EVERY 12 HOURS SCHEDULED
Status: DISCONTINUED | OUTPATIENT
Start: 2019-11-15 | End: 2019-11-18

## 2019-11-15 RX ORDER — FOLIC ACID 1 MG/1
1 TABLET ORAL DAILY
Status: DISCONTINUED | OUTPATIENT
Start: 2019-11-15 | End: 2019-11-22 | Stop reason: HOSPADM

## 2019-11-15 RX ORDER — LORAZEPAM 2 MG/ML
3 INJECTION INTRAMUSCULAR
Status: DISCONTINUED | OUTPATIENT
Start: 2019-11-15 | End: 2019-11-21

## 2019-11-15 RX ORDER — ASPIRIN 81 MG/1
81 TABLET, CHEWABLE ORAL DAILY
Status: DISCONTINUED | OUTPATIENT
Start: 2019-11-15 | End: 2019-11-22 | Stop reason: HOSPADM

## 2019-11-15 RX ORDER — LORAZEPAM 2 MG/ML
1 INJECTION INTRAMUSCULAR
Status: DISCONTINUED | OUTPATIENT
Start: 2019-11-15 | End: 2019-11-21

## 2019-11-15 RX ORDER — LORAZEPAM 1 MG/1
1 TABLET ORAL
Status: DISCONTINUED | OUTPATIENT
Start: 2019-11-15 | End: 2019-11-21

## 2019-11-15 RX ORDER — FERROUS SULFATE 325(65) MG
325 TABLET ORAL 2 TIMES DAILY WITH MEALS
Status: DISCONTINUED | OUTPATIENT
Start: 2019-11-15 | End: 2019-11-22 | Stop reason: HOSPADM

## 2019-11-15 RX ORDER — PANTOPRAZOLE SODIUM 40 MG/10ML
40 INJECTION, POWDER, LYOPHILIZED, FOR SOLUTION INTRAVENOUS 2 TIMES DAILY
Status: DISCONTINUED | OUTPATIENT
Start: 2019-11-15 | End: 2019-11-22 | Stop reason: HOSPADM

## 2019-11-15 RX ORDER — LORAZEPAM 2 MG/ML
2 INJECTION INTRAMUSCULAR
Status: DISCONTINUED | OUTPATIENT
Start: 2019-11-15 | End: 2019-11-21

## 2019-11-15 RX ORDER — SODIUM CHLORIDE 0.9 % (FLUSH) 0.9 %
10 SYRINGE (ML) INJECTION PRN
Status: DISCONTINUED | OUTPATIENT
Start: 2019-11-15 | End: 2019-11-18

## 2019-11-15 RX ADMIN — FUROSEMIDE 20 MG: 10 INJECTION, SOLUTION INTRAVENOUS at 09:29

## 2019-11-15 RX ADMIN — FERROUS SULFATE TAB 325 MG (65 MG ELEMENTAL FE) 325 MG: 325 (65 FE) TAB at 09:30

## 2019-11-15 RX ADMIN — Medication 100 MG: at 09:30

## 2019-11-15 RX ADMIN — RANOLAZINE 500 MG: 500 TABLET, FILM COATED, EXTENDED RELEASE ORAL at 20:21

## 2019-11-15 RX ADMIN — PANTOPRAZOLE SODIUM 40 MG: 40 INJECTION, POWDER, FOR SOLUTION INTRAVENOUS at 13:55

## 2019-11-15 RX ADMIN — Medication 10 ML: at 09:31

## 2019-11-15 RX ADMIN — PANTOPRAZOLE SODIUM 40 MG: 40 INJECTION, POWDER, FOR SOLUTION INTRAVENOUS at 20:21

## 2019-11-15 RX ADMIN — ASPIRIN 81 MG 81 MG: 81 TABLET ORAL at 09:30

## 2019-11-15 RX ADMIN — FOLIC ACID 1 MG: 1 TABLET ORAL at 09:30

## 2019-11-15 RX ADMIN — PERFLUTREN 2.2 MG: 6.52 INJECTION, SUSPENSION INTRAVENOUS at 09:10

## 2019-11-15 RX ADMIN — ATORVASTATIN CALCIUM 40 MG: 40 TABLET, FILM COATED ORAL at 20:21

## 2019-11-15 RX ADMIN — METOPROLOL SUCCINATE 50 MG: 50 TABLET, EXTENDED RELEASE ORAL at 09:30

## 2019-11-15 RX ADMIN — IPRATROPIUM BROMIDE AND ALBUTEROL SULFATE 1 AMPULE: .5; 3 SOLUTION RESPIRATORY (INHALATION) at 20:07

## 2019-11-15 RX ADMIN — RANOLAZINE 500 MG: 500 TABLET, FILM COATED, EXTENDED RELEASE ORAL at 09:30

## 2019-11-15 RX ADMIN — ISOSORBIDE MONONITRATE 30 MG: 30 TABLET, EXTENDED RELEASE ORAL at 09:30

## 2019-11-15 RX ADMIN — FUROSEMIDE 20 MG: 10 INJECTION, SOLUTION INTRAVENOUS at 16:49

## 2019-11-15 RX ADMIN — Medication 10 ML: at 20:21

## 2019-11-15 RX ADMIN — FERROUS SULFATE TAB 325 MG (65 MG ELEMENTAL FE) 325 MG: 325 (65 FE) TAB at 16:49

## 2019-11-15 ASSESSMENT — PAIN SCALES - GENERAL: PAINLEVEL_OUTOF10: 0

## 2019-11-16 LAB
AMMONIA: 16 UMOL/L (ref 16–60)
ANION GAP SERPL CALCULATED.3IONS-SCNC: 13 MMOL/L (ref 4–16)
BUN BLDV-MCNC: 26 MG/DL (ref 6–23)
CALCIUM SERPL-MCNC: 8.2 MG/DL (ref 8.3–10.6)
CEA: 2.1 NG/ML
CHLORIDE BLD-SCNC: 100 MMOL/L (ref 99–110)
CO2: 22 MMOL/L (ref 21–32)
CREAT SERPL-MCNC: 1.6 MG/DL (ref 0.9–1.3)
GFR AFRICAN AMERICAN: 50 ML/MIN/1.73M2
GFR NON-AFRICAN AMERICAN: 41 ML/MIN/1.73M2
GLUCOSE BLD-MCNC: 84 MG/DL (ref 70–99)
HCT VFR BLD CALC: 29.2 % (ref 42–52)
HEMOGLOBIN: 7.5 GM/DL (ref 13.5–18)
MAGNESIUM: 1.8 MG/DL (ref 1.8–2.4)
MCH RBC QN AUTO: 17.1 PG (ref 27–31)
MCHC RBC AUTO-ENTMCNC: 25.7 % (ref 32–36)
MCV RBC AUTO: 66.7 FL (ref 78–100)
PDW BLD-RTO: 23.9 % (ref 11.7–14.9)
PLATELET # BLD: 217 K/CU MM (ref 140–440)
POTASSIUM SERPL-SCNC: 4.2 MMOL/L (ref 3.5–5.1)
RBC # BLD: 4.38 M/CU MM (ref 4.6–6.2)
SODIUM BLD-SCNC: 135 MMOL/L (ref 135–145)
WBC # BLD: 5.2 K/CU MM (ref 4–10.5)

## 2019-11-16 PROCEDURE — C9113 INJ PANTOPRAZOLE SODIUM, VIA: HCPCS | Performed by: HOSPITALIST

## 2019-11-16 PROCEDURE — 85027 COMPLETE CBC AUTOMATED: CPT

## 2019-11-16 PROCEDURE — 82140 ASSAY OF AMMONIA: CPT

## 2019-11-16 PROCEDURE — 6370000000 HC RX 637 (ALT 250 FOR IP): Performed by: NURSE PRACTITIONER

## 2019-11-16 PROCEDURE — 94761 N-INVAS EAR/PLS OXIMETRY MLT: CPT

## 2019-11-16 PROCEDURE — 82378 CARCINOEMBRYONIC ANTIGEN: CPT

## 2019-11-16 PROCEDURE — 6360000002 HC RX W HCPCS: Performed by: HOSPITALIST

## 2019-11-16 PROCEDURE — 6370000000 HC RX 637 (ALT 250 FOR IP): Performed by: HOSPITALIST

## 2019-11-16 PROCEDURE — 36415 COLL VENOUS BLD VENIPUNCTURE: CPT

## 2019-11-16 PROCEDURE — 2580000003 HC RX 258: Performed by: HOSPITALIST

## 2019-11-16 PROCEDURE — 83735 ASSAY OF MAGNESIUM: CPT

## 2019-11-16 PROCEDURE — 2060000000 HC ICU INTERMEDIATE R&B

## 2019-11-16 PROCEDURE — 80048 BASIC METABOLIC PNL TOTAL CA: CPT

## 2019-11-16 PROCEDURE — 6360000002 HC RX W HCPCS: Performed by: NURSE PRACTITIONER

## 2019-11-16 PROCEDURE — 6370000000 HC RX 637 (ALT 250 FOR IP): Performed by: INTERNAL MEDICINE

## 2019-11-16 PROCEDURE — 94640 AIRWAY INHALATION TREATMENT: CPT

## 2019-11-16 PROCEDURE — 99232 SBSQ HOSP IP/OBS MODERATE 35: CPT | Performed by: INTERNAL MEDICINE

## 2019-11-16 RX ORDER — TAMSULOSIN HYDROCHLORIDE 0.4 MG/1
0.4 CAPSULE ORAL DAILY
Status: DISCONTINUED | OUTPATIENT
Start: 2019-11-16 | End: 2019-11-22 | Stop reason: HOSPADM

## 2019-11-16 RX ADMIN — Medication 10 ML: at 13:17

## 2019-11-16 RX ADMIN — FERROUS SULFATE TAB 325 MG (65 MG ELEMENTAL FE) 325 MG: 325 (65 FE) TAB at 13:16

## 2019-11-16 RX ADMIN — FUROSEMIDE 20 MG: 10 INJECTION, SOLUTION INTRAVENOUS at 17:50

## 2019-11-16 RX ADMIN — LORAZEPAM 4 MG: 2 INJECTION, SOLUTION INTRAMUSCULAR; INTRAVENOUS at 03:06

## 2019-11-16 RX ADMIN — AZITHROMYCIN MONOHYDRATE 500 MG: 500 INJECTION, POWDER, LYOPHILIZED, FOR SOLUTION INTRAVENOUS at 13:06

## 2019-11-16 RX ADMIN — ACETAMINOPHEN 650 MG: 325 TABLET ORAL at 22:39

## 2019-11-16 RX ADMIN — FUROSEMIDE 20 MG: 10 INJECTION, SOLUTION INTRAVENOUS at 13:15

## 2019-11-16 RX ADMIN — CEFTRIAXONE 1 G: 1 INJECTION, POWDER, FOR SOLUTION INTRAMUSCULAR; INTRAVENOUS at 12:20

## 2019-11-16 RX ADMIN — IPRATROPIUM BROMIDE AND ALBUTEROL SULFATE 1 AMPULE: .5; 3 SOLUTION RESPIRATORY (INHALATION) at 08:32

## 2019-11-16 RX ADMIN — Medication 10 ML: at 19:56

## 2019-11-16 RX ADMIN — PANTOPRAZOLE SODIUM 40 MG: 40 INJECTION, POWDER, FOR SOLUTION INTRAVENOUS at 13:15

## 2019-11-16 RX ADMIN — Medication 10 ML: at 12:20

## 2019-11-16 RX ADMIN — PANTOPRAZOLE SODIUM 40 MG: 40 INJECTION, POWDER, FOR SOLUTION INTRAVENOUS at 19:56

## 2019-11-16 RX ADMIN — RANOLAZINE 500 MG: 500 TABLET, FILM COATED, EXTENDED RELEASE ORAL at 19:56

## 2019-11-16 RX ADMIN — ASPIRIN 81 MG 81 MG: 81 TABLET ORAL at 13:16

## 2019-11-16 RX ADMIN — ATORVASTATIN CALCIUM 40 MG: 40 TABLET, FILM COATED ORAL at 19:56

## 2019-11-16 RX ADMIN — FERROUS SULFATE TAB 325 MG (65 MG ELEMENTAL FE) 325 MG: 325 (65 FE) TAB at 17:50

## 2019-11-16 RX ADMIN — METOPROLOL SUCCINATE 50 MG: 50 TABLET, EXTENDED RELEASE ORAL at 13:16

## 2019-11-16 RX ADMIN — IPRATROPIUM BROMIDE AND ALBUTEROL SULFATE 1 AMPULE: .5; 3 SOLUTION RESPIRATORY (INHALATION) at 20:13

## 2019-11-16 RX ADMIN — RANOLAZINE 500 MG: 500 TABLET, FILM COATED, EXTENDED RELEASE ORAL at 13:17

## 2019-11-16 RX ADMIN — TAMSULOSIN HYDROCHLORIDE 0.4 MG: 0.4 CAPSULE ORAL at 13:16

## 2019-11-16 RX ADMIN — IPRATROPIUM BROMIDE AND ALBUTEROL SULFATE 1 AMPULE: .5; 3 SOLUTION RESPIRATORY (INHALATION) at 11:51

## 2019-11-16 RX ADMIN — LORAZEPAM 2 MG: 1 TABLET ORAL at 02:04

## 2019-11-16 RX ADMIN — LORAZEPAM 1 MG: 2 INJECTION, SOLUTION INTRAMUSCULAR; INTRAVENOUS at 19:57

## 2019-11-16 RX ADMIN — Medication 100 MG: at 13:15

## 2019-11-16 RX ADMIN — IPRATROPIUM BROMIDE AND ALBUTEROL SULFATE 1 AMPULE: .5; 3 SOLUTION RESPIRATORY (INHALATION) at 15:41

## 2019-11-16 RX ADMIN — FOLIC ACID 1 MG: 1 TABLET ORAL at 13:16

## 2019-11-16 ASSESSMENT — PAIN SCALES - GENERAL
PAINLEVEL_OUTOF10: 0
PAINLEVEL_OUTOF10: 0

## 2019-11-16 ASSESSMENT — PAIN DESCRIPTION - PAIN TYPE: TYPE: ACUTE PAIN

## 2019-11-16 ASSESSMENT — PAIN DESCRIPTION - LOCATION: LOCATION: HEAD

## 2019-11-17 LAB
ADENOVIRUS DETECTION BY PCR: NOT DETECTED
ANION GAP SERPL CALCULATED.3IONS-SCNC: 12 MMOL/L (ref 4–16)
BASE EXCESS MIXED: ABNORMAL (ref 0–1.2)
BORDETELLA PERTUSSIS PCR: NOT DETECTED
BUN BLDV-MCNC: 22 MG/DL (ref 6–23)
CALCIUM SERPL-MCNC: 8.5 MG/DL (ref 8.3–10.6)
CARBON MONOXIDE, BLOOD: 1.9 % (ref 0–5)
CHLAMYDOPHILA PNEUMONIA PCR: NOT DETECTED
CHLORIDE BLD-SCNC: 99 MMOL/L (ref 99–110)
CO2 CONTENT: 28.4 MMOL/L (ref 19–24)
CO2: 28 MMOL/L (ref 21–32)
COMMENT: ABNORMAL
CORONAVIRUS 229E PCR: NOT DETECTED
CORONAVIRUS HKU1 PCR: NOT DETECTED
CORONAVIRUS NL63 PCR: NOT DETECTED
CORONAVIRUS OC43 PCR: NOT DETECTED
CREAT SERPL-MCNC: 1.6 MG/DL (ref 0.9–1.3)
CULTURE: NORMAL
GFR AFRICAN AMERICAN: 50 ML/MIN/1.73M2
GFR NON-AFRICAN AMERICAN: 41 ML/MIN/1.73M2
GLUCOSE BLD-MCNC: 100 MG/DL (ref 70–99)
GLUCOSE BLD-MCNC: 112 MG/DL (ref 70–99)
GLUCOSE BLD-MCNC: 95 MG/DL (ref 70–99)
HCO3 ARTERIAL: 27.3 MMOL/L (ref 18–23)
HCT VFR BLD CALC: 31.5 % (ref 42–52)
HEMOGLOBIN: 8.5 GM/DL (ref 13.5–18)
HUMAN METAPNEUMOVIRUS PCR: NOT DETECTED
INFLUENZA A BY PCR: NOT DETECTED
INFLUENZA A H1 (2009) PCR: NOT DETECTED
INFLUENZA A H1 PANDEMIC PCR: NOT DETECTED
INFLUENZA A H3 PCR: NOT DETECTED
INFLUENZA B BY PCR: NOT DETECTED
Lab: NORMAL
MAGNESIUM: 1.8 MG/DL (ref 1.8–2.4)
MCH RBC QN AUTO: 17.5 PG (ref 27–31)
MCHC RBC AUTO-ENTMCNC: 27 % (ref 32–36)
MCV RBC AUTO: 64.9 FL (ref 78–100)
METHEMOGLOBIN ARTERIAL: 1.5 %
MYCOPLASMA PNEUMONIAE PCR: NOT DETECTED
O2 SATURATION: 95.1 % (ref 96–97)
PARAINFLUENZA 1 PCR: NOT DETECTED
PARAINFLUENZA 2 PCR: NOT DETECTED
PARAINFLUENZA 3 PCR: NOT DETECTED
PARAINFLUENZA 4 PCR: NOT DETECTED
PCO2 ARTERIAL: 35 MMHG (ref 32–45)
PDW BLD-RTO: 25.5 % (ref 11.7–14.9)
PH BLOOD: 7.5 (ref 7.34–7.45)
PLATELET # BLD: 209 K/CU MM (ref 140–440)
PO2 ARTERIAL: 107 MMHG (ref 75–100)
POTASSIUM SERPL-SCNC: 4.1 MMOL/L (ref 3.5–5.1)
PRO-BNP: ABNORMAL PG/ML
RBC # BLD: 4.85 M/CU MM (ref 4.6–6.2)
RHINOVIRUS ENTEROVIRUS PCR: NOT DETECTED
RSV PCR: NOT DETECTED
SODIUM BLD-SCNC: 139 MMOL/L (ref 135–145)
SPECIMEN: NORMAL
WBC # BLD: 7.5 K/CU MM (ref 4–10.5)

## 2019-11-17 PROCEDURE — 99232 SBSQ HOSP IP/OBS MODERATE 35: CPT | Performed by: INTERNAL MEDICINE

## 2019-11-17 PROCEDURE — 36415 COLL VENOUS BLD VENIPUNCTURE: CPT

## 2019-11-17 PROCEDURE — 85027 COMPLETE CBC AUTOMATED: CPT

## 2019-11-17 PROCEDURE — 6370000000 HC RX 637 (ALT 250 FOR IP): Performed by: NURSE PRACTITIONER

## 2019-11-17 PROCEDURE — 2060000000 HC ICU INTERMEDIATE R&B

## 2019-11-17 PROCEDURE — 2580000003 HC RX 258: Performed by: HOSPITALIST

## 2019-11-17 PROCEDURE — 87581 M.PNEUMON DNA AMP PROBE: CPT

## 2019-11-17 PROCEDURE — 87040 BLOOD CULTURE FOR BACTERIA: CPT

## 2019-11-17 PROCEDURE — 6360000002 HC RX W HCPCS: Performed by: HOSPITALIST

## 2019-11-17 PROCEDURE — 82962 GLUCOSE BLOOD TEST: CPT

## 2019-11-17 PROCEDURE — 6370000000 HC RX 637 (ALT 250 FOR IP): Performed by: INTERNAL MEDICINE

## 2019-11-17 PROCEDURE — 82803 BLOOD GASES ANY COMBINATION: CPT

## 2019-11-17 PROCEDURE — 83735 ASSAY OF MAGNESIUM: CPT

## 2019-11-17 PROCEDURE — 6370000000 HC RX 637 (ALT 250 FOR IP): Performed by: HOSPITALIST

## 2019-11-17 PROCEDURE — C9113 INJ PANTOPRAZOLE SODIUM, VIA: HCPCS | Performed by: HOSPITALIST

## 2019-11-17 PROCEDURE — 83880 ASSAY OF NATRIURETIC PEPTIDE: CPT

## 2019-11-17 PROCEDURE — 87633 RESP VIRUS 12-25 TARGETS: CPT

## 2019-11-17 PROCEDURE — 94640 AIRWAY INHALATION TREATMENT: CPT

## 2019-11-17 PROCEDURE — 87899 AGENT NOS ASSAY W/OPTIC: CPT

## 2019-11-17 PROCEDURE — 80048 BASIC METABOLIC PNL TOTAL CA: CPT

## 2019-11-17 PROCEDURE — 36600 WITHDRAWAL OF ARTERIAL BLOOD: CPT

## 2019-11-17 PROCEDURE — 87486 CHLMYD PNEUM DNA AMP PROBE: CPT

## 2019-11-17 PROCEDURE — 87798 DETECT AGENT NOS DNA AMP: CPT

## 2019-11-17 PROCEDURE — 6360000002 HC RX W HCPCS: Performed by: NURSE PRACTITIONER

## 2019-11-17 PROCEDURE — 87449 NOS EACH ORGANISM AG IA: CPT

## 2019-11-17 PROCEDURE — 94761 N-INVAS EAR/PLS OXIMETRY MLT: CPT

## 2019-11-17 RX ADMIN — FERROUS SULFATE TAB 325 MG (65 MG ELEMENTAL FE) 325 MG: 325 (65 FE) TAB at 11:01

## 2019-11-17 RX ADMIN — RANOLAZINE 500 MG: 500 TABLET, FILM COATED, EXTENDED RELEASE ORAL at 11:02

## 2019-11-17 RX ADMIN — IPRATROPIUM BROMIDE AND ALBUTEROL SULFATE 1 AMPULE: .5; 3 SOLUTION RESPIRATORY (INHALATION) at 15:57

## 2019-11-17 RX ADMIN — CEFTRIAXONE 1 G: 1 INJECTION, POWDER, FOR SOLUTION INTRAMUSCULAR; INTRAVENOUS at 10:28

## 2019-11-17 RX ADMIN — Medication 10 ML: at 11:18

## 2019-11-17 RX ADMIN — PANTOPRAZOLE SODIUM 40 MG: 40 INJECTION, POWDER, FOR SOLUTION INTRAVENOUS at 10:58

## 2019-11-17 RX ADMIN — Medication 10 ML: at 20:12

## 2019-11-17 RX ADMIN — FUROSEMIDE 20 MG: 10 INJECTION, SOLUTION INTRAVENOUS at 17:52

## 2019-11-17 RX ADMIN — AZITHROMYCIN MONOHYDRATE 500 MG: 500 INJECTION, POWDER, LYOPHILIZED, FOR SOLUTION INTRAVENOUS at 11:06

## 2019-11-17 RX ADMIN — FERROUS SULFATE TAB 325 MG (65 MG ELEMENTAL FE) 325 MG: 325 (65 FE) TAB at 17:52

## 2019-11-17 RX ADMIN — IPRATROPIUM BROMIDE AND ALBUTEROL SULFATE 1 AMPULE: .5; 3 SOLUTION RESPIRATORY (INHALATION) at 11:21

## 2019-11-17 RX ADMIN — Medication 100 MG: at 11:02

## 2019-11-17 RX ADMIN — ASPIRIN 81 MG 81 MG: 81 TABLET ORAL at 10:59

## 2019-11-17 RX ADMIN — PANTOPRAZOLE SODIUM 40 MG: 40 INJECTION, POWDER, FOR SOLUTION INTRAVENOUS at 20:12

## 2019-11-17 RX ADMIN — FUROSEMIDE 20 MG: 10 INJECTION, SOLUTION INTRAVENOUS at 10:58

## 2019-11-17 RX ADMIN — RANOLAZINE 500 MG: 500 TABLET, FILM COATED, EXTENDED RELEASE ORAL at 20:12

## 2019-11-17 RX ADMIN — IPRATROPIUM BROMIDE AND ALBUTEROL SULFATE 1 AMPULE: .5; 3 SOLUTION RESPIRATORY (INHALATION) at 08:05

## 2019-11-17 RX ADMIN — IPRATROPIUM BROMIDE AND ALBUTEROL SULFATE 1 AMPULE: .5; 3 SOLUTION RESPIRATORY (INHALATION) at 19:50

## 2019-11-17 RX ADMIN — ATORVASTATIN CALCIUM 40 MG: 40 TABLET, FILM COATED ORAL at 20:12

## 2019-11-17 RX ADMIN — TAMSULOSIN HYDROCHLORIDE 0.4 MG: 0.4 CAPSULE ORAL at 11:02

## 2019-11-17 RX ADMIN — FOLIC ACID 1 MG: 1 TABLET ORAL at 11:02

## 2019-11-17 RX ADMIN — ENALAPRIL MALEATE 5 MG: 5 TABLET ORAL at 11:02

## 2019-11-17 ASSESSMENT — PAIN SCALES - GENERAL
PAINLEVEL_OUTOF10: 0

## 2019-11-18 LAB
ANION GAP SERPL CALCULATED.3IONS-SCNC: 10 MMOL/L (ref 4–16)
APTT: 20.5 SECONDS (ref 25.1–37.1)
BASOPHILS ABSOLUTE: 0.1 K/CU MM
BASOPHILS RELATIVE PERCENT: 0.8 % (ref 0–1)
BUN BLDV-MCNC: 21 MG/DL (ref 6–23)
CALCIUM SERPL-MCNC: 8.5 MG/DL (ref 8.3–10.6)
CHLORIDE BLD-SCNC: 97 MMOL/L (ref 99–110)
CO2: 31 MMOL/L (ref 21–32)
CREAT SERPL-MCNC: 1.7 MG/DL (ref 0.9–1.3)
DIFFERENTIAL TYPE: ABNORMAL
EOSINOPHILS ABSOLUTE: 0.2 K/CU MM
EOSINOPHILS RELATIVE PERCENT: 3 % (ref 0–3)
GFR AFRICAN AMERICAN: 47 ML/MIN/1.73M2
GFR NON-AFRICAN AMERICAN: 39 ML/MIN/1.73M2
GLUCOSE BLD-MCNC: 76 MG/DL (ref 70–99)
HCT VFR BLD CALC: 28.1 % (ref 42–52)
HCT VFR BLD CALC: 28.3 % (ref 42–52)
HCT VFR BLD CALC: 31.9 % (ref 42–52)
HEMOGLOBIN: 7.5 GM/DL (ref 13.5–18)
HEMOGLOBIN: 7.6 GM/DL (ref 13.5–18)
HEMOGLOBIN: 8.4 GM/DL (ref 13.5–18)
IMMATURE NEUTROPHIL %: 0.3 % (ref 0–0.43)
LEGIONELLA URINARY AG: NEGATIVE
LYMPHOCYTES ABSOLUTE: 0.7 K/CU MM
LYMPHOCYTES RELATIVE PERCENT: 12.4 % (ref 24–44)
MAGNESIUM: 1.8 MG/DL (ref 1.8–2.4)
MCH RBC QN AUTO: 17.4 PG (ref 27–31)
MCH RBC QN AUTO: 17.4 PG (ref 27–31)
MCHC RBC AUTO-ENTMCNC: 26.3 % (ref 32–36)
MCHC RBC AUTO-ENTMCNC: 26.7 % (ref 32–36)
MCV RBC AUTO: 65.2 FL (ref 78–100)
MCV RBC AUTO: 66 FL (ref 78–100)
MONOCYTES ABSOLUTE: 0.6 K/CU MM
MONOCYTES RELATIVE PERCENT: 9.9 % (ref 0–4)
NUCLEATED RBC %: 0 %
PDW BLD-RTO: 25.4 % (ref 11.7–14.9)
PDW BLD-RTO: 25.4 % (ref 11.7–14.9)
PLATELET # BLD: 149 K/CU MM (ref 140–440)
PLATELET # BLD: 226 K/CU MM (ref 140–440)
POTASSIUM SERPL-SCNC: 4 MMOL/L (ref 3.5–5.1)
RBC # BLD: 4.31 M/CU MM (ref 4.6–6.2)
RBC # BLD: 4.83 M/CU MM (ref 4.6–6.2)
SEGMENTED NEUTROPHILS ABSOLUTE COUNT: 4.4 K/CU MM
SEGMENTED NEUTROPHILS RELATIVE PERCENT: 73.6 % (ref 36–66)
SODIUM BLD-SCNC: 138 MMOL/L (ref 135–145)
STREP PNEUMONIAE ANTIGEN: NORMAL
STREP PNEUMONIAE ANTIGEN: NORMAL
TOTAL IMMATURE NEUTOROPHIL: 0.02 K/CU MM
TOTAL NUCLEATED RBC: 0 K/CU MM
WBC # BLD: 5.3 K/CU MM (ref 4–10.5)
WBC # BLD: 6 K/CU MM (ref 4–10.5)

## 2019-11-18 PROCEDURE — C9113 INJ PANTOPRAZOLE SODIUM, VIA: HCPCS | Performed by: HOSPITALIST

## 2019-11-18 PROCEDURE — 93005 ELECTROCARDIOGRAM TRACING: CPT | Performed by: HOSPITALIST

## 2019-11-18 PROCEDURE — 6370000000 HC RX 637 (ALT 250 FOR IP): Performed by: NURSE PRACTITIONER

## 2019-11-18 PROCEDURE — 76937 US GUIDE VASCULAR ACCESS: CPT

## 2019-11-18 PROCEDURE — 99232 SBSQ HOSP IP/OBS MODERATE 35: CPT | Performed by: INTERNAL MEDICINE

## 2019-11-18 PROCEDURE — 83735 ASSAY OF MAGNESIUM: CPT

## 2019-11-18 PROCEDURE — P9045 ALBUMIN (HUMAN), 5%, 250 ML: HCPCS | Performed by: HOSPITALIST

## 2019-11-18 PROCEDURE — 6360000002 HC RX W HCPCS: Performed by: NURSE PRACTITIONER

## 2019-11-18 PROCEDURE — C1751 CATH, INF, PER/CENT/MIDLINE: HCPCS

## 2019-11-18 PROCEDURE — 85018 HEMOGLOBIN: CPT

## 2019-11-18 PROCEDURE — 85025 COMPLETE CBC W/AUTO DIFF WBC: CPT

## 2019-11-18 PROCEDURE — 92610 EVALUATE SWALLOWING FUNCTION: CPT | Performed by: SPEECH-LANGUAGE PATHOLOGIST

## 2019-11-18 PROCEDURE — 36415 COLL VENOUS BLD VENIPUNCTURE: CPT

## 2019-11-18 PROCEDURE — 36410 VNPNXR 3YR/> PHY/QHP DX/THER: CPT

## 2019-11-18 PROCEDURE — 2580000003 HC RX 258: Performed by: HOSPITALIST

## 2019-11-18 PROCEDURE — 37799 UNLISTED PX VASCULAR SURGERY: CPT

## 2019-11-18 PROCEDURE — 85027 COMPLETE CBC AUTOMATED: CPT

## 2019-11-18 PROCEDURE — 05HC33Z INSERTION OF INFUSION DEVICE INTO LEFT BASILIC VEIN, PERCUTANEOUS APPROACH: ICD-10-PCS | Performed by: INTERNAL MEDICINE

## 2019-11-18 PROCEDURE — 85730 THROMBOPLASTIN TIME PARTIAL: CPT

## 2019-11-18 PROCEDURE — 85014 HEMATOCRIT: CPT

## 2019-11-18 PROCEDURE — 2060000000 HC ICU INTERMEDIATE R&B

## 2019-11-18 PROCEDURE — 6370000000 HC RX 637 (ALT 250 FOR IP): Performed by: HOSPITALIST

## 2019-11-18 PROCEDURE — 6360000002 HC RX W HCPCS: Performed by: HOSPITALIST

## 2019-11-18 PROCEDURE — 6370000000 HC RX 637 (ALT 250 FOR IP): Performed by: INTERNAL MEDICINE

## 2019-11-18 PROCEDURE — 94761 N-INVAS EAR/PLS OXIMETRY MLT: CPT

## 2019-11-18 PROCEDURE — 94640 AIRWAY INHALATION TREATMENT: CPT

## 2019-11-18 PROCEDURE — 80048 BASIC METABOLIC PNL TOTAL CA: CPT

## 2019-11-18 PROCEDURE — 94664 DEMO&/EVAL PT USE INHALER: CPT

## 2019-11-18 RX ORDER — SODIUM CHLORIDE 0.9 % (FLUSH) 0.9 %
10 SYRINGE (ML) INJECTION EVERY 12 HOURS SCHEDULED
Status: DISCONTINUED | OUTPATIENT
Start: 2019-11-18 | End: 2019-11-22 | Stop reason: HOSPADM

## 2019-11-18 RX ORDER — MIDODRINE HYDROCHLORIDE 5 MG/1
5 TABLET ORAL
Status: DISCONTINUED | OUTPATIENT
Start: 2019-11-18 | End: 2019-11-18

## 2019-11-18 RX ORDER — HEPARIN SODIUM 10000 [USP'U]/100ML
12 INJECTION, SOLUTION INTRAVENOUS CONTINUOUS
Status: DISCONTINUED | OUTPATIENT
Start: 2019-11-18 | End: 2019-11-19

## 2019-11-18 RX ORDER — HEPARIN SODIUM 1000 [USP'U]/ML
30 INJECTION, SOLUTION INTRAVENOUS; SUBCUTANEOUS PRN
Status: DISCONTINUED | OUTPATIENT
Start: 2019-11-18 | End: 2019-11-21

## 2019-11-18 RX ORDER — MIDODRINE HYDROCHLORIDE 5 MG/1
5 TABLET ORAL ONCE
Status: COMPLETED | OUTPATIENT
Start: 2019-11-18 | End: 2019-11-18

## 2019-11-18 RX ORDER — METOPROLOL SUCCINATE 25 MG/1
25 TABLET, EXTENDED RELEASE ORAL DAILY
Status: DISCONTINUED | OUTPATIENT
Start: 2019-11-19 | End: 2019-11-22 | Stop reason: HOSPADM

## 2019-11-18 RX ORDER — MIDODRINE HYDROCHLORIDE 5 MG/1
5 TABLET ORAL
Status: DISCONTINUED | OUTPATIENT
Start: 2019-11-18 | End: 2019-11-22 | Stop reason: HOSPADM

## 2019-11-18 RX ORDER — HEPARIN SODIUM 1000 [USP'U]/ML
60 INJECTION, SOLUTION INTRAVENOUS; SUBCUTANEOUS ONCE
Status: COMPLETED | OUTPATIENT
Start: 2019-11-18 | End: 2019-11-18

## 2019-11-18 RX ORDER — DIGOXIN 125 MCG
125 TABLET ORAL ONCE
Status: COMPLETED | OUTPATIENT
Start: 2019-11-18 | End: 2019-11-18

## 2019-11-18 RX ORDER — LIDOCAINE HYDROCHLORIDE 10 MG/ML
5 INJECTION, SOLUTION EPIDURAL; INFILTRATION; INTRACAUDAL; PERINEURAL ONCE
Status: COMPLETED | OUTPATIENT
Start: 2019-11-18 | End: 2019-11-18

## 2019-11-18 RX ORDER — ALBUMIN, HUMAN INJ 5% 5 %
25 SOLUTION INTRAVENOUS ONCE
Status: COMPLETED | OUTPATIENT
Start: 2019-11-18 | End: 2019-11-18

## 2019-11-18 RX ORDER — SODIUM CHLORIDE 0.9 % (FLUSH) 0.9 %
10 SYRINGE (ML) INJECTION PRN
Status: DISCONTINUED | OUTPATIENT
Start: 2019-11-18 | End: 2019-11-22 | Stop reason: HOSPADM

## 2019-11-18 RX ORDER — HEPARIN SODIUM 1000 [USP'U]/ML
60 INJECTION, SOLUTION INTRAVENOUS; SUBCUTANEOUS PRN
Status: DISCONTINUED | OUTPATIENT
Start: 2019-11-18 | End: 2019-11-21

## 2019-11-18 RX ORDER — FUROSEMIDE 40 MG/1
40 TABLET ORAL DAILY
Status: DISCONTINUED | OUTPATIENT
Start: 2019-11-19 | End: 2019-11-22 | Stop reason: HOSPADM

## 2019-11-18 RX ADMIN — MIDODRINE HYDROCHLORIDE 5 MG: 5 TABLET ORAL at 10:30

## 2019-11-18 RX ADMIN — CEFTRIAXONE 1 G: 1 INJECTION, POWDER, FOR SOLUTION INTRAMUSCULAR; INTRAVENOUS at 08:29

## 2019-11-18 RX ADMIN — FERROUS SULFATE TAB 325 MG (65 MG ELEMENTAL FE) 325 MG: 325 (65 FE) TAB at 16:49

## 2019-11-18 RX ADMIN — Medication 100 MG: at 08:28

## 2019-11-18 RX ADMIN — ALBUMIN (HUMAN) 25 G: 12.5 INJECTION, SOLUTION INTRAVENOUS at 14:19

## 2019-11-18 RX ADMIN — IRON SUCROSE 100 MG: 20 INJECTION, SOLUTION INTRAVENOUS at 16:50

## 2019-11-18 RX ADMIN — FOLIC ACID 1 MG: 1 TABLET ORAL at 08:28

## 2019-11-18 RX ADMIN — TAMSULOSIN HYDROCHLORIDE 0.4 MG: 0.4 CAPSULE ORAL at 08:28

## 2019-11-18 RX ADMIN — ASPIRIN 81 MG 81 MG: 81 TABLET ORAL at 08:28

## 2019-11-18 RX ADMIN — Medication 10 ML: at 21:13

## 2019-11-18 RX ADMIN — MIDODRINE HYDROCHLORIDE 5 MG: 5 TABLET ORAL at 16:49

## 2019-11-18 RX ADMIN — ACETAMINOPHEN 650 MG: 325 TABLET ORAL at 10:10

## 2019-11-18 RX ADMIN — HEPARIN SODIUM 12 UNITS/KG/HR: 10000 INJECTION, SOLUTION INTRAVENOUS at 16:03

## 2019-11-18 RX ADMIN — FUROSEMIDE 20 MG: 10 INJECTION, SOLUTION INTRAVENOUS at 08:28

## 2019-11-18 RX ADMIN — PANTOPRAZOLE SODIUM 40 MG: 40 INJECTION, POWDER, FOR SOLUTION INTRAVENOUS at 08:28

## 2019-11-18 RX ADMIN — AZITHROMYCIN MONOHYDRATE 500 MG: 500 INJECTION, POWDER, LYOPHILIZED, FOR SOLUTION INTRAVENOUS at 09:49

## 2019-11-18 RX ADMIN — RANOLAZINE 500 MG: 500 TABLET, FILM COATED, EXTENDED RELEASE ORAL at 08:28

## 2019-11-18 RX ADMIN — ATORVASTATIN CALCIUM 40 MG: 40 TABLET, FILM COATED ORAL at 21:13

## 2019-11-18 RX ADMIN — PANTOPRAZOLE SODIUM 40 MG: 40 INJECTION, POWDER, FOR SOLUTION INTRAVENOUS at 21:13

## 2019-11-18 RX ADMIN — RANOLAZINE 500 MG: 500 TABLET, FILM COATED, EXTENDED RELEASE ORAL at 21:12

## 2019-11-18 RX ADMIN — IPRATROPIUM BROMIDE AND ALBUTEROL SULFATE 1 AMPULE: .5; 3 SOLUTION RESPIRATORY (INHALATION) at 11:48

## 2019-11-18 RX ADMIN — MIDODRINE HYDROCHLORIDE 5 MG: 5 TABLET ORAL at 11:54

## 2019-11-18 RX ADMIN — DIGOXIN 125 MCG: 125 TABLET ORAL at 10:11

## 2019-11-18 RX ADMIN — LIDOCAINE HYDROCHLORIDE 5 ML: 10 INJECTION, SOLUTION EPIDURAL; INFILTRATION; INTRACAUDAL; PERINEURAL at 18:20

## 2019-11-18 RX ADMIN — HEPARIN SODIUM 4590 UNITS: 1000 INJECTION, SOLUTION INTRAVENOUS; SUBCUTANEOUS at 15:57

## 2019-11-18 RX ADMIN — IPRATROPIUM BROMIDE AND ALBUTEROL SULFATE 1 AMPULE: .5; 3 SOLUTION RESPIRATORY (INHALATION) at 08:12

## 2019-11-18 RX ADMIN — Medication 10 ML: at 08:28

## 2019-11-18 RX ADMIN — FERROUS SULFATE TAB 325 MG (65 MG ELEMENTAL FE) 325 MG: 325 (65 FE) TAB at 08:28

## 2019-11-18 ASSESSMENT — PAIN SCALES - GENERAL
PAINLEVEL_OUTOF10: 0
PAINLEVEL_OUTOF10: 1

## 2019-11-19 LAB
ANION GAP SERPL CALCULATED.3IONS-SCNC: 10 MMOL/L (ref 4–16)
APTT: 150.5 SECONDS (ref 25.1–37.1)
APTT: >240 SECONDS (ref 25.1–37.1)
BUN BLDV-MCNC: 22 MG/DL (ref 6–23)
CALCIUM SERPL-MCNC: 7.9 MG/DL (ref 8.3–10.6)
CHLORIDE BLD-SCNC: 97 MMOL/L (ref 99–110)
CO2: 28 MMOL/L (ref 21–32)
CREAT SERPL-MCNC: 1.8 MG/DL (ref 0.9–1.3)
EKG ATRIAL RATE: 77 BPM
EKG DIAGNOSIS: NORMAL
EKG Q-T INTERVAL: 410 MS
EKG QRS DURATION: 90 MS
EKG QTC CALCULATION (BAZETT): 498 MS
EKG R AXIS: 65 DEGREES
EKG T AXIS: -27 DEGREES
EKG VENTRICULAR RATE: 89 BPM
GFR AFRICAN AMERICAN: 44 ML/MIN/1.73M2
GFR NON-AFRICAN AMERICAN: 36 ML/MIN/1.73M2
GLUCOSE BLD-MCNC: 89 MG/DL (ref 70–99)
MAGNESIUM: 1.9 MG/DL (ref 1.8–2.4)
POTASSIUM SERPL-SCNC: 3.8 MMOL/L (ref 3.5–5.1)
SODIUM BLD-SCNC: 135 MMOL/L (ref 135–145)

## 2019-11-19 PROCEDURE — 80048 BASIC METABOLIC PNL TOTAL CA: CPT

## 2019-11-19 PROCEDURE — 94761 N-INVAS EAR/PLS OXIMETRY MLT: CPT

## 2019-11-19 PROCEDURE — 6360000002 HC RX W HCPCS: Performed by: INTERNAL MEDICINE

## 2019-11-19 PROCEDURE — 6360000002 HC RX W HCPCS: Performed by: HOSPITALIST

## 2019-11-19 PROCEDURE — 99232 SBSQ HOSP IP/OBS MODERATE 35: CPT | Performed by: INTERNAL MEDICINE

## 2019-11-19 PROCEDURE — 93010 ELECTROCARDIOGRAM REPORT: CPT | Performed by: INTERNAL MEDICINE

## 2019-11-19 PROCEDURE — 94640 AIRWAY INHALATION TREATMENT: CPT

## 2019-11-19 PROCEDURE — 2580000003 HC RX 258: Performed by: HOSPITALIST

## 2019-11-19 PROCEDURE — 6370000000 HC RX 637 (ALT 250 FOR IP): Performed by: HOSPITALIST

## 2019-11-19 PROCEDURE — 85730 THROMBOPLASTIN TIME PARTIAL: CPT

## 2019-11-19 PROCEDURE — 83735 ASSAY OF MAGNESIUM: CPT

## 2019-11-19 PROCEDURE — C9113 INJ PANTOPRAZOLE SODIUM, VIA: HCPCS | Performed by: HOSPITALIST

## 2019-11-19 PROCEDURE — 6370000000 HC RX 637 (ALT 250 FOR IP): Performed by: NURSE PRACTITIONER

## 2019-11-19 PROCEDURE — 6370000000 HC RX 637 (ALT 250 FOR IP): Performed by: INTERNAL MEDICINE

## 2019-11-19 PROCEDURE — 2060000000 HC ICU INTERMEDIATE R&B

## 2019-11-19 PROCEDURE — 37799 UNLISTED PX VASCULAR SURGERY: CPT

## 2019-11-19 RX ADMIN — FUROSEMIDE 40 MG: 40 TABLET ORAL at 09:59

## 2019-11-19 RX ADMIN — ENOXAPARIN SODIUM 30 MG: 30 INJECTION SUBCUTANEOUS at 21:13

## 2019-11-19 RX ADMIN — Medication 10 ML: at 21:14

## 2019-11-19 RX ADMIN — HEPARIN SODIUM 9 UNITS/KG/HR: 10000 INJECTION, SOLUTION INTRAVENOUS at 04:50

## 2019-11-19 RX ADMIN — AZITHROMYCIN MONOHYDRATE 500 MG: 500 INJECTION, POWDER, LYOPHILIZED, FOR SOLUTION INTRAVENOUS at 10:12

## 2019-11-19 RX ADMIN — PANTOPRAZOLE SODIUM 40 MG: 40 INJECTION, POWDER, FOR SOLUTION INTRAVENOUS at 21:13

## 2019-11-19 RX ADMIN — MIDODRINE HYDROCHLORIDE 5 MG: 5 TABLET ORAL at 11:49

## 2019-11-19 RX ADMIN — IPRATROPIUM BROMIDE AND ALBUTEROL SULFATE 1 AMPULE: .5; 3 SOLUTION RESPIRATORY (INHALATION) at 15:40

## 2019-11-19 RX ADMIN — RANOLAZINE 500 MG: 500 TABLET, FILM COATED, EXTENDED RELEASE ORAL at 21:13

## 2019-11-19 RX ADMIN — MIDODRINE HYDROCHLORIDE 5 MG: 5 TABLET ORAL at 10:00

## 2019-11-19 RX ADMIN — ATORVASTATIN CALCIUM 40 MG: 40 TABLET, FILM COATED ORAL at 21:13

## 2019-11-19 RX ADMIN — ACETAMINOPHEN 650 MG: 325 TABLET ORAL at 21:13

## 2019-11-19 RX ADMIN — CEFTRIAXONE 1 G: 1 INJECTION, POWDER, FOR SOLUTION INTRAMUSCULAR; INTRAVENOUS at 09:43

## 2019-11-19 RX ADMIN — FOLIC ACID 1 MG: 1 TABLET ORAL at 10:00

## 2019-11-19 RX ADMIN — MIDODRINE HYDROCHLORIDE 5 MG: 5 TABLET ORAL at 17:12

## 2019-11-19 RX ADMIN — ASPIRIN 81 MG 81 MG: 81 TABLET ORAL at 10:01

## 2019-11-19 RX ADMIN — Medication 10 ML: at 10:01

## 2019-11-19 RX ADMIN — FERROUS SULFATE TAB 325 MG (65 MG ELEMENTAL FE) 325 MG: 325 (65 FE) TAB at 17:12

## 2019-11-19 RX ADMIN — FERROUS SULFATE TAB 325 MG (65 MG ELEMENTAL FE) 325 MG: 325 (65 FE) TAB at 10:00

## 2019-11-19 RX ADMIN — PANTOPRAZOLE SODIUM 40 MG: 40 INJECTION, POWDER, FOR SOLUTION INTRAVENOUS at 10:01

## 2019-11-19 RX ADMIN — RANOLAZINE 500 MG: 500 TABLET, FILM COATED, EXTENDED RELEASE ORAL at 09:59

## 2019-11-19 RX ADMIN — TAMSULOSIN HYDROCHLORIDE 0.4 MG: 0.4 CAPSULE ORAL at 10:00

## 2019-11-19 RX ADMIN — Medication 100 MG: at 09:59

## 2019-11-19 ASSESSMENT — PAIN SCALES - GENERAL: PAINLEVEL_OUTOF10: 1

## 2019-11-20 LAB
PLATELET # BLD: 202 K/CU MM (ref 140–440)
PRO-BNP: 7022 PG/ML

## 2019-11-20 PROCEDURE — 94640 AIRWAY INHALATION TREATMENT: CPT

## 2019-11-20 PROCEDURE — 94664 DEMO&/EVAL PT USE INHALER: CPT

## 2019-11-20 PROCEDURE — 6370000000 HC RX 637 (ALT 250 FOR IP): Performed by: INTERNAL MEDICINE

## 2019-11-20 PROCEDURE — 2580000003 HC RX 258: Performed by: HOSPITALIST

## 2019-11-20 PROCEDURE — 99232 SBSQ HOSP IP/OBS MODERATE 35: CPT | Performed by: INTERNAL MEDICINE

## 2019-11-20 PROCEDURE — C9113 INJ PANTOPRAZOLE SODIUM, VIA: HCPCS | Performed by: HOSPITALIST

## 2019-11-20 PROCEDURE — 6370000000 HC RX 637 (ALT 250 FOR IP): Performed by: NURSE PRACTITIONER

## 2019-11-20 PROCEDURE — 2060000000 HC ICU INTERMEDIATE R&B

## 2019-11-20 PROCEDURE — 6370000000 HC RX 637 (ALT 250 FOR IP): Performed by: HOSPITALIST

## 2019-11-20 PROCEDURE — 94761 N-INVAS EAR/PLS OXIMETRY MLT: CPT

## 2019-11-20 PROCEDURE — 36415 COLL VENOUS BLD VENIPUNCTURE: CPT

## 2019-11-20 PROCEDURE — 85049 AUTOMATED PLATELET COUNT: CPT

## 2019-11-20 PROCEDURE — 6360000002 HC RX W HCPCS: Performed by: INTERNAL MEDICINE

## 2019-11-20 PROCEDURE — 6360000002 HC RX W HCPCS: Performed by: HOSPITALIST

## 2019-11-20 PROCEDURE — 83880 ASSAY OF NATRIURETIC PEPTIDE: CPT

## 2019-11-20 RX ADMIN — Medication 10 ML: at 10:04

## 2019-11-20 RX ADMIN — FUROSEMIDE 40 MG: 40 TABLET ORAL at 10:05

## 2019-11-20 RX ADMIN — PANTOPRAZOLE SODIUM 40 MG: 40 INJECTION, POWDER, FOR SOLUTION INTRAVENOUS at 19:50

## 2019-11-20 RX ADMIN — FOLIC ACID 1 MG: 1 TABLET ORAL at 10:05

## 2019-11-20 RX ADMIN — IPRATROPIUM BROMIDE AND ALBUTEROL SULFATE 1 AMPULE: .5; 3 SOLUTION RESPIRATORY (INHALATION) at 11:39

## 2019-11-20 RX ADMIN — PANTOPRAZOLE SODIUM 40 MG: 40 INJECTION, POWDER, FOR SOLUTION INTRAVENOUS at 10:06

## 2019-11-20 RX ADMIN — IPRATROPIUM BROMIDE AND ALBUTEROL SULFATE 1 AMPULE: .5; 3 SOLUTION RESPIRATORY (INHALATION) at 15:37

## 2019-11-20 RX ADMIN — MIDODRINE HYDROCHLORIDE 5 MG: 5 TABLET ORAL at 17:31

## 2019-11-20 RX ADMIN — ASPIRIN 81 MG 81 MG: 81 TABLET ORAL at 10:05

## 2019-11-20 RX ADMIN — ENOXAPARIN SODIUM 30 MG: 30 INJECTION SUBCUTANEOUS at 10:04

## 2019-11-20 RX ADMIN — ACETAMINOPHEN 650 MG: 325 TABLET ORAL at 19:51

## 2019-11-20 RX ADMIN — AZITHROMYCIN MONOHYDRATE 500 MG: 500 INJECTION, POWDER, LYOPHILIZED, FOR SOLUTION INTRAVENOUS at 10:56

## 2019-11-20 RX ADMIN — Medication 100 MG: at 10:06

## 2019-11-20 RX ADMIN — TAMSULOSIN HYDROCHLORIDE 0.4 MG: 0.4 CAPSULE ORAL at 10:06

## 2019-11-20 RX ADMIN — FERROUS SULFATE TAB 325 MG (65 MG ELEMENTAL FE) 325 MG: 325 (65 FE) TAB at 10:06

## 2019-11-20 RX ADMIN — RANOLAZINE 500 MG: 500 TABLET, FILM COATED, EXTENDED RELEASE ORAL at 10:06

## 2019-11-20 RX ADMIN — ATORVASTATIN CALCIUM 40 MG: 40 TABLET, FILM COATED ORAL at 19:51

## 2019-11-20 RX ADMIN — CEFTRIAXONE 1 G: 1 INJECTION, POWDER, FOR SOLUTION INTRAMUSCULAR; INTRAVENOUS at 10:04

## 2019-11-20 RX ADMIN — FERROUS SULFATE TAB 325 MG (65 MG ELEMENTAL FE) 325 MG: 325 (65 FE) TAB at 17:31

## 2019-11-20 RX ADMIN — RANOLAZINE 500 MG: 500 TABLET, FILM COATED, EXTENDED RELEASE ORAL at 19:51

## 2019-11-20 RX ADMIN — IPRATROPIUM BROMIDE AND ALBUTEROL SULFATE 1 AMPULE: .5; 3 SOLUTION RESPIRATORY (INHALATION) at 21:19

## 2019-11-20 RX ADMIN — MIDODRINE HYDROCHLORIDE 5 MG: 5 TABLET ORAL at 10:05

## 2019-11-20 RX ADMIN — Medication 10 ML: at 19:51

## 2019-11-20 RX ADMIN — MIDODRINE HYDROCHLORIDE 5 MG: 5 TABLET ORAL at 12:31

## 2019-11-20 ASSESSMENT — PAIN SCALES - GENERAL
PAINLEVEL_OUTOF10: 0

## 2019-11-21 PROCEDURE — C9113 INJ PANTOPRAZOLE SODIUM, VIA: HCPCS | Performed by: HOSPITALIST

## 2019-11-21 PROCEDURE — 6370000000 HC RX 637 (ALT 250 FOR IP): Performed by: HOSPITALIST

## 2019-11-21 PROCEDURE — 2060000000 HC ICU INTERMEDIATE R&B

## 2019-11-21 PROCEDURE — 6360000002 HC RX W HCPCS: Performed by: HOSPITALIST

## 2019-11-21 PROCEDURE — 2580000003 HC RX 258: Performed by: HOSPITALIST

## 2019-11-21 PROCEDURE — 6370000000 HC RX 637 (ALT 250 FOR IP): Performed by: NURSE PRACTITIONER

## 2019-11-21 PROCEDURE — 6370000000 HC RX 637 (ALT 250 FOR IP): Performed by: INTERNAL MEDICINE

## 2019-11-21 PROCEDURE — 99232 SBSQ HOSP IP/OBS MODERATE 35: CPT | Performed by: INTERNAL MEDICINE

## 2019-11-21 PROCEDURE — 6360000002 HC RX W HCPCS: Performed by: INTERNAL MEDICINE

## 2019-11-21 PROCEDURE — 94640 AIRWAY INHALATION TREATMENT: CPT

## 2019-11-21 PROCEDURE — 97166 OT EVAL MOD COMPLEX 45 MIN: CPT

## 2019-11-21 PROCEDURE — 97116 GAIT TRAINING THERAPY: CPT

## 2019-11-21 PROCEDURE — 97163 PT EVAL HIGH COMPLEX 45 MIN: CPT

## 2019-11-21 PROCEDURE — 94761 N-INVAS EAR/PLS OXIMETRY MLT: CPT

## 2019-11-21 PROCEDURE — 97530 THERAPEUTIC ACTIVITIES: CPT

## 2019-11-21 RX ADMIN — FERROUS SULFATE TAB 325 MG (65 MG ELEMENTAL FE) 325 MG: 325 (65 FE) TAB at 16:44

## 2019-11-21 RX ADMIN — MIDODRINE HYDROCHLORIDE 5 MG: 5 TABLET ORAL at 16:44

## 2019-11-21 RX ADMIN — CEFTRIAXONE 1 G: 1 INJECTION, POWDER, FOR SOLUTION INTRAMUSCULAR; INTRAVENOUS at 09:51

## 2019-11-21 RX ADMIN — PANTOPRAZOLE SODIUM 40 MG: 40 INJECTION, POWDER, FOR SOLUTION INTRAVENOUS at 20:23

## 2019-11-21 RX ADMIN — MIDODRINE HYDROCHLORIDE 5 MG: 5 TABLET ORAL at 12:34

## 2019-11-21 RX ADMIN — ACETAMINOPHEN 650 MG: 325 TABLET ORAL at 20:23

## 2019-11-21 RX ADMIN — RANOLAZINE 500 MG: 500 TABLET, FILM COATED, EXTENDED RELEASE ORAL at 09:51

## 2019-11-21 RX ADMIN — MIDODRINE HYDROCHLORIDE 5 MG: 5 TABLET ORAL at 07:52

## 2019-11-21 RX ADMIN — ENOXAPARIN SODIUM 30 MG: 30 INJECTION SUBCUTANEOUS at 09:52

## 2019-11-21 RX ADMIN — TAMSULOSIN HYDROCHLORIDE 0.4 MG: 0.4 CAPSULE ORAL at 09:50

## 2019-11-21 RX ADMIN — Medication 10 ML: at 09:52

## 2019-11-21 RX ADMIN — FOLIC ACID 1 MG: 1 TABLET ORAL at 09:50

## 2019-11-21 RX ADMIN — PANTOPRAZOLE SODIUM 40 MG: 40 INJECTION, POWDER, FOR SOLUTION INTRAVENOUS at 09:50

## 2019-11-21 RX ADMIN — FUROSEMIDE 40 MG: 40 TABLET ORAL at 09:51

## 2019-11-21 RX ADMIN — ATORVASTATIN CALCIUM 40 MG: 40 TABLET, FILM COATED ORAL at 20:23

## 2019-11-21 RX ADMIN — IPRATROPIUM BROMIDE AND ALBUTEROL SULFATE 1 AMPULE: .5; 3 SOLUTION RESPIRATORY (INHALATION) at 21:59

## 2019-11-21 RX ADMIN — FERROUS SULFATE TAB 325 MG (65 MG ELEMENTAL FE) 325 MG: 325 (65 FE) TAB at 07:52

## 2019-11-21 RX ADMIN — RANOLAZINE 500 MG: 500 TABLET, FILM COATED, EXTENDED RELEASE ORAL at 20:23

## 2019-11-21 RX ADMIN — Medication 100 MG: at 09:51

## 2019-11-21 RX ADMIN — IPRATROPIUM BROMIDE AND ALBUTEROL SULFATE 1 AMPULE: .5; 3 SOLUTION RESPIRATORY (INHALATION) at 08:00

## 2019-11-21 RX ADMIN — Medication 10 ML: at 20:23

## 2019-11-21 RX ADMIN — IPRATROPIUM BROMIDE AND ALBUTEROL SULFATE 1 AMPULE: .5; 3 SOLUTION RESPIRATORY (INHALATION) at 15:56

## 2019-11-21 RX ADMIN — ASPIRIN 81 MG 81 MG: 81 TABLET ORAL at 09:50

## 2019-11-21 ASSESSMENT — PAIN SCALES - GENERAL
PAINLEVEL_OUTOF10: 0
PAINLEVEL_OUTOF10: 0

## 2019-11-22 VITALS
WEIGHT: 162.48 LBS | HEART RATE: 88 BPM | BODY MASS INDEX: 24.62 KG/M2 | OXYGEN SATURATION: 99 % | RESPIRATION RATE: 12 BRPM | DIASTOLIC BLOOD PRESSURE: 60 MMHG | SYSTOLIC BLOOD PRESSURE: 116 MMHG | HEIGHT: 68 IN | TEMPERATURE: 97.6 F

## 2019-11-22 LAB
CULTURE: NORMAL
CULTURE: NORMAL
Lab: NORMAL
Lab: NORMAL
PLATELET # BLD: 212 K/CU MM (ref 140–440)
SPECIMEN: NORMAL
SPECIMEN: NORMAL

## 2019-11-22 PROCEDURE — 99232 SBSQ HOSP IP/OBS MODERATE 35: CPT | Performed by: INTERNAL MEDICINE

## 2019-11-22 PROCEDURE — C9113 INJ PANTOPRAZOLE SODIUM, VIA: HCPCS | Performed by: HOSPITALIST

## 2019-11-22 PROCEDURE — 51701 INSERT BLADDER CATHETER: CPT

## 2019-11-22 PROCEDURE — 2580000003 HC RX 258: Performed by: HOSPITALIST

## 2019-11-22 PROCEDURE — 6370000000 HC RX 637 (ALT 250 FOR IP): Performed by: HOSPITALIST

## 2019-11-22 PROCEDURE — 6360000002 HC RX W HCPCS: Performed by: HOSPITALIST

## 2019-11-22 PROCEDURE — 94761 N-INVAS EAR/PLS OXIMETRY MLT: CPT

## 2019-11-22 PROCEDURE — 85049 AUTOMATED PLATELET COUNT: CPT

## 2019-11-22 PROCEDURE — 6360000002 HC RX W HCPCS: Performed by: INTERNAL MEDICINE

## 2019-11-22 PROCEDURE — 6370000000 HC RX 637 (ALT 250 FOR IP): Performed by: NURSE PRACTITIONER

## 2019-11-22 PROCEDURE — 6370000000 HC RX 637 (ALT 250 FOR IP): Performed by: INTERNAL MEDICINE

## 2019-11-22 PROCEDURE — 94640 AIRWAY INHALATION TREATMENT: CPT

## 2019-11-22 PROCEDURE — 51798 US URINE CAPACITY MEASURE: CPT

## 2019-11-22 RX ORDER — MIDODRINE HYDROCHLORIDE 5 MG/1
5 TABLET ORAL
Qty: 90 TABLET | Refills: 3 | Status: ON HOLD | OUTPATIENT
Start: 2019-11-22 | End: 2020-04-02 | Stop reason: HOSPADM

## 2019-11-22 RX ORDER — TAMSULOSIN HYDROCHLORIDE 0.4 MG/1
0.4 CAPSULE ORAL DAILY
Qty: 30 CAPSULE | Refills: 3 | Status: SHIPPED | OUTPATIENT
Start: 2019-11-23 | End: 2020-01-13

## 2019-11-22 RX ORDER — FUROSEMIDE 40 MG/1
40 TABLET ORAL DAILY
Qty: 60 TABLET | Refills: 3 | Status: ON HOLD | OUTPATIENT
Start: 2019-11-23 | End: 2020-04-02 | Stop reason: HOSPADM

## 2019-11-22 RX ORDER — FERROUS SULFATE 325(65) MG
325 TABLET ORAL 2 TIMES DAILY WITH MEALS
Qty: 30 TABLET | Refills: 3 | Status: ON HOLD | OUTPATIENT
Start: 2019-11-22 | End: 2020-04-02 | Stop reason: HOSPADM

## 2019-11-22 RX ORDER — FOLIC ACID 1 MG/1
1 TABLET ORAL DAILY
Qty: 30 TABLET | Refills: 3 | Status: SHIPPED | OUTPATIENT
Start: 2019-11-23

## 2019-11-22 RX ORDER — LANOLIN ALCOHOL/MO/W.PET/CERES
100 CREAM (GRAM) TOPICAL DAILY
Qty: 30 TABLET | Refills: 3 | Status: SHIPPED | OUTPATIENT
Start: 2019-11-23 | End: 2019-12-07 | Stop reason: ALTCHOICE

## 2019-11-22 RX ADMIN — FOLIC ACID 1 MG: 1 TABLET ORAL at 09:57

## 2019-11-22 RX ADMIN — Medication 100 MG: at 09:57

## 2019-11-22 RX ADMIN — FERROUS SULFATE TAB 325 MG (65 MG ELEMENTAL FE) 325 MG: 325 (65 FE) TAB at 09:56

## 2019-11-22 RX ADMIN — MIDODRINE HYDROCHLORIDE 5 MG: 5 TABLET ORAL at 09:56

## 2019-11-22 RX ADMIN — ASPIRIN 81 MG 81 MG: 81 TABLET ORAL at 09:56

## 2019-11-22 RX ADMIN — Medication 10 ML: at 09:57

## 2019-11-22 RX ADMIN — FERROUS SULFATE TAB 325 MG (65 MG ELEMENTAL FE) 325 MG: 325 (65 FE) TAB at 16:52

## 2019-11-22 RX ADMIN — CEFTRIAXONE 1 G: 1 INJECTION, POWDER, FOR SOLUTION INTRAMUSCULAR; INTRAVENOUS at 09:56

## 2019-11-22 RX ADMIN — ENOXAPARIN SODIUM 30 MG: 30 INJECTION SUBCUTANEOUS at 09:55

## 2019-11-22 RX ADMIN — IPRATROPIUM BROMIDE AND ALBUTEROL SULFATE 1 AMPULE: .5; 3 SOLUTION RESPIRATORY (INHALATION) at 07:36

## 2019-11-22 RX ADMIN — TAMSULOSIN HYDROCHLORIDE 0.4 MG: 0.4 CAPSULE ORAL at 09:56

## 2019-11-22 RX ADMIN — PANTOPRAZOLE SODIUM 40 MG: 40 INJECTION, POWDER, FOR SOLUTION INTRAVENOUS at 09:57

## 2019-11-22 RX ADMIN — FUROSEMIDE 40 MG: 40 TABLET ORAL at 09:56

## 2019-11-22 RX ADMIN — RANOLAZINE 500 MG: 500 TABLET, FILM COATED, EXTENDED RELEASE ORAL at 09:57

## 2019-11-26 ENCOUNTER — HOSPITAL ENCOUNTER (OUTPATIENT)
Age: 84
Discharge: HOME | End: 2019-11-26

## 2019-11-26 LAB
ANION GAP SERPL CALCULATED.3IONS-SCNC: 9 MMOL/L (ref 4–16)
BUN BLDV-MCNC: 25 MG/DL (ref 6–23)
CALCIUM SERPL-MCNC: 8.2 MG/DL (ref 8.3–10.6)
CHLORIDE BLD-SCNC: 101 MMOL/L (ref 99–110)
CO2: 30 MMOL/L (ref 21–32)
CREAT SERPL-MCNC: 1.6 MG/DL (ref 0.9–1.3)
GFR AFRICAN AMERICAN: 50 ML/MIN/1.73M2
GFR NON-AFRICAN AMERICAN: 41 ML/MIN/1.73M2
GLUCOSE BLD-MCNC: 78 MG/DL (ref 70–99)
HCT VFR BLD CALC: 29.5 % (ref 42–52)
HEMOGLOBIN: 7.6 GM/DL (ref 13.5–18)
MCH RBC QN AUTO: 18.2 PG (ref 27–31)
MCHC RBC AUTO-ENTMCNC: 25.8 % (ref 32–36)
MCV RBC AUTO: 70.6 FL (ref 78–100)
PDW BLD-RTO: 30.2 % (ref 11.7–14.9)
PLATELET # BLD: 253 K/CU MM (ref 140–440)
POTASSIUM SERPL-SCNC: 3.6 MMOL/L (ref 3.5–5.1)
RBC # BLD: 4.18 M/CU MM (ref 4.6–6.2)
SODIUM BLD-SCNC: 140 MMOL/L (ref 135–145)
WBC # BLD: 4.5 K/CU MM (ref 4–10.5)

## 2019-11-26 PROCEDURE — 85027 COMPLETE CBC AUTOMATED: CPT

## 2019-11-26 PROCEDURE — 36415 COLL VENOUS BLD VENIPUNCTURE: CPT

## 2019-11-26 PROCEDURE — 80048 BASIC METABOLIC PNL TOTAL CA: CPT

## 2019-11-30 ENCOUNTER — HOSPITAL ENCOUNTER (OUTPATIENT)
Age: 84
Discharge: HOME | End: 2019-11-30

## 2019-11-30 PROCEDURE — 85027 COMPLETE CBC AUTOMATED: CPT

## 2019-12-03 ENCOUNTER — HOSPITAL ENCOUNTER (OUTPATIENT)
Age: 84
Discharge: HOME | End: 2019-12-03

## 2019-12-03 LAB
ANION GAP SERPL CALCULATED.3IONS-SCNC: 11 MMOL/L (ref 4–16)
BUN BLDV-MCNC: 29 MG/DL (ref 6–23)
CALCIUM SERPL-MCNC: 8.6 MG/DL (ref 8.3–10.6)
CHLORIDE BLD-SCNC: 103 MMOL/L (ref 99–110)
CO2: 26 MMOL/L (ref 21–32)
CREAT SERPL-MCNC: 1.2 MG/DL (ref 0.9–1.3)
GFR AFRICAN AMERICAN: >60 ML/MIN/1.73M2
GFR NON-AFRICAN AMERICAN: 58 ML/MIN/1.73M2
GLUCOSE BLD-MCNC: 87 MG/DL (ref 70–99)
HCT VFR BLD CALC: 27 % (ref 42–52)
HEMOGLOBIN: 7 GM/DL (ref 13.5–18)
MCH RBC QN AUTO: 20.2 PG (ref 27–31)
MCHC RBC AUTO-ENTMCNC: 25.9 % (ref 32–36)
MCV RBC AUTO: 77.8 FL (ref 78–100)
PDW BLD-RTO: 35.9 % (ref 11.7–14.9)
PLATELET # BLD: 342 K/CU MM (ref 140–440)
PMV BLD AUTO: 11.2 FL (ref 7.5–11.1)
POTASSIUM SERPL-SCNC: 4.4 MMOL/L (ref 3.5–5.1)
RBC # BLD: 3.47 M/CU MM (ref 4.6–6.2)
SODIUM BLD-SCNC: 140 MMOL/L (ref 135–145)
WBC # BLD: 5.9 K/CU MM (ref 4–10.5)

## 2019-12-03 PROCEDURE — 80048 BASIC METABOLIC PNL TOTAL CA: CPT

## 2019-12-03 PROCEDURE — 36415 COLL VENOUS BLD VENIPUNCTURE: CPT

## 2019-12-03 PROCEDURE — 85027 COMPLETE CBC AUTOMATED: CPT

## 2019-12-04 ENCOUNTER — HOSPITAL ENCOUNTER (OUTPATIENT)
Age: 84
Discharge: HOME | End: 2019-12-04

## 2019-12-04 LAB
HCT VFR BLD CALC: 30.1 % (ref 42–52)
HEMOGLOBIN: 7.6 GM/DL (ref 13.5–18)

## 2019-12-04 PROCEDURE — 85014 HEMATOCRIT: CPT

## 2019-12-04 PROCEDURE — 85018 HEMOGLOBIN: CPT

## 2019-12-04 PROCEDURE — 36415 COLL VENOUS BLD VENIPUNCTURE: CPT

## 2019-12-06 ENCOUNTER — HOSPITAL ENCOUNTER (OUTPATIENT)
Age: 84
Discharge: HOME | End: 2019-12-06

## 2019-12-06 LAB
HCT VFR BLD CALC: 23.2 % (ref 42–52)
HEMOGLOBIN: ABNORMAL GM/DL (ref 13.5–18)
MCH RBC QN AUTO: 21.4 PG (ref 27–31)
MCHC RBC AUTO-ENTMCNC: 26.7 % (ref 32–36)
MCV RBC AUTO: 80 FL (ref 78–100)
PLATELET # BLD: 317 K/CU MM (ref 140–440)
PMV BLD AUTO: 10.4 FL (ref 7.5–11.1)
RBC # BLD: 2.9 M/CU MM (ref 4.6–6.2)
WBC # BLD: 4.7 K/CU MM (ref 4–10.5)

## 2019-12-06 PROCEDURE — 86922 COMPATIBILITY TEST ANTIGLOB: CPT

## 2019-12-06 PROCEDURE — 86850 RBC ANTIBODY SCREEN: CPT

## 2019-12-06 PROCEDURE — 36415 COLL VENOUS BLD VENIPUNCTURE: CPT

## 2019-12-06 PROCEDURE — 86901 BLOOD TYPING SEROLOGIC RH(D): CPT

## 2019-12-06 PROCEDURE — 85027 COMPLETE CBC AUTOMATED: CPT

## 2019-12-06 PROCEDURE — 86900 BLOOD TYPING SEROLOGIC ABO: CPT

## 2019-12-07 ENCOUNTER — HOSPITAL ENCOUNTER (OUTPATIENT)
Dept: INFUSION THERAPY | Age: 84
Setting detail: INFUSION SERIES
Discharge: HOME | End: 2019-12-07
Payer: COMMERCIAL

## 2019-12-07 VITALS
RESPIRATION RATE: 16 BRPM | DIASTOLIC BLOOD PRESSURE: 62 MMHG | HEART RATE: 68 BPM | OXYGEN SATURATION: 96 % | SYSTOLIC BLOOD PRESSURE: 97 MMHG | TEMPERATURE: 97.5 F

## 2019-12-07 PROCEDURE — 99211 OFF/OP EST MAY X REQ PHY/QHP: CPT

## 2019-12-07 PROCEDURE — 36430 TRANSFUSION BLD/BLD COMPNT: CPT

## 2019-12-07 PROCEDURE — 2580000003 HC RX 258: Performed by: INTERNAL MEDICINE

## 2019-12-07 PROCEDURE — P9016 RBC LEUKOCYTES REDUCED: HCPCS

## 2019-12-07 RX ORDER — CARVEDILOL 3.12 MG/1
3.12 TABLET ORAL DAILY
COMMUNITY
End: 2020-01-13

## 2019-12-07 RX ORDER — 0.9 % SODIUM CHLORIDE 0.9 %
10 VIAL (ML) INJECTION PRN
Status: DISCONTINUED | OUTPATIENT
Start: 2019-12-07 | End: 2019-12-08 | Stop reason: HOSPADM

## 2019-12-07 RX ORDER — 0.9 % SODIUM CHLORIDE 0.9 %
250 INTRAVENOUS SOLUTION INTRAVENOUS ONCE
Status: COMPLETED | OUTPATIENT
Start: 2019-12-07 | End: 2019-12-07

## 2019-12-07 RX ORDER — PYRIDOSTIGMINE BROMIDE 60 MG/1
60 TABLET ORAL 3 TIMES DAILY
COMMUNITY

## 2019-12-07 RX ADMIN — Medication 10 ML: at 09:35

## 2019-12-07 RX ADMIN — SODIUM CHLORIDE 250 ML: 9 INJECTION, SOLUTION INTRAVENOUS at 08:40

## 2019-12-07 NOTE — PLAN OF CARE
To unit room 5 for Blood Transfusion. Orientated to unit. Procedure and plan of care explained. Questions answered. Understanding verbalized.

## 2019-12-07 NOTE — DISCHARGE SUMMARY
Tolerated Transfusion  well. Discharge instructions explained written copy given. Understanding verbalized. Discharged to [facility] with  per wheelchair

## 2019-12-08 LAB
ABO/RH: NORMAL
ANTIBODY SCREEN: NEGATIVE
COMPONENT: NORMAL
CROSSMATCH RESULT: NORMAL
STATUS: NORMAL
TRANSFUSION STATUS: NORMAL
UNIT DIVISION: 0
UNIT NUMBER: NORMAL

## 2019-12-10 ENCOUNTER — HOSPITAL ENCOUNTER (OUTPATIENT)
Age: 84
Discharge: HOME | End: 2019-12-10

## 2019-12-10 LAB
HCT VFR BLD CALC: 32.4 % (ref 42–52)
HEMOGLOBIN: 8.7 GM/DL (ref 13.5–18)
MCH RBC QN AUTO: 23 PG (ref 27–31)
MCHC RBC AUTO-ENTMCNC: 26.9 % (ref 32–36)
MCV RBC AUTO: 85.5 FL (ref 78–100)
PDW BLD-RTO: 35.3 % (ref 11.7–14.9)
PLATELET # BLD: 283 K/CU MM (ref 140–440)
PMV BLD AUTO: 10.3 FL (ref 7.5–11.1)
RBC # BLD: 3.79 M/CU MM (ref 4.6–6.2)
WBC # BLD: 5.6 K/CU MM (ref 4–10.5)

## 2019-12-10 PROCEDURE — 85027 COMPLETE CBC AUTOMATED: CPT

## 2019-12-10 PROCEDURE — 36415 COLL VENOUS BLD VENIPUNCTURE: CPT

## 2020-01-13 ENCOUNTER — OFFICE VISIT (OUTPATIENT)
Dept: CARDIOLOGY CLINIC | Age: 85
End: 2020-01-13
Payer: MEDICARE

## 2020-01-13 VITALS
SYSTOLIC BLOOD PRESSURE: 110 MMHG | WEIGHT: 154.6 LBS | DIASTOLIC BLOOD PRESSURE: 70 MMHG | HEIGHT: 68 IN | BODY MASS INDEX: 23.43 KG/M2 | HEART RATE: 60 BPM

## 2020-01-13 PROCEDURE — 1036F TOBACCO NON-USER: CPT | Performed by: INTERNAL MEDICINE

## 2020-01-13 PROCEDURE — 1123F ACP DISCUSS/DSCN MKR DOCD: CPT | Performed by: INTERNAL MEDICINE

## 2020-01-13 PROCEDURE — 99214 OFFICE O/P EST MOD 30 MIN: CPT | Performed by: INTERNAL MEDICINE

## 2020-01-13 PROCEDURE — 4040F PNEUMOC VAC/ADMIN/RCVD: CPT | Performed by: INTERNAL MEDICINE

## 2020-01-13 PROCEDURE — G8427 DOCREV CUR MEDS BY ELIG CLIN: HCPCS | Performed by: INTERNAL MEDICINE

## 2020-01-13 PROCEDURE — G8420 CALC BMI NORM PARAMETERS: HCPCS | Performed by: INTERNAL MEDICINE

## 2020-01-13 PROCEDURE — G8484 FLU IMMUNIZE NO ADMIN: HCPCS | Performed by: INTERNAL MEDICINE

## 2020-01-13 RX ORDER — METOPROLOL SUCCINATE 25 MG/1
25 TABLET, EXTENDED RELEASE ORAL DAILY
Qty: 30 TABLET | Refills: 3 | Status: ON HOLD
Start: 2020-01-13 | End: 2020-04-02 | Stop reason: HOSPADM

## 2020-01-13 RX ORDER — ASPIRIN 81 MG/1
81 TABLET ORAL DAILY
Qty: 90 TABLET | Refills: 1 | Status: SHIPPED | OUTPATIENT
Start: 2020-01-13

## 2020-01-13 NOTE — LETTER
CLINICAL STAFF DOCUMENTATION    Minoo Guerra  1935  [id number]    Have you had any Chest Pain - No      Have you had any Shortness of Breath - No      Have you had any dizziness - No    Have you had any palpitations - No        Do you have any edema - swelling in legs          Do you have a surgery or procedure scheduled in the near future - No

## 2020-01-13 NOTE — PROGRESS NOTES
Smoking status: Former Smoker     Packs/day: 1.50     Years: 35.00     Pack years: 52.50     Last attempt to quit: 1977     Years since quittin.4    Smokeless tobacco: Never Used   Substance Use Topics    Alcohol use: Yes     Comment: once in awhile         Review of Systems:   · Constitutional: No Fever or Weight Loss   · Eyes: No Decreased Vision  · ENT: No Headaches, Hearing Loss or Vertigo  · Cardiovascular: as per note above   · Respiratory: No cough or wheezing and as per note above. · Gastrointestinal: No abdominal pain, appetite loss, blood in stools, constipation, diarrhea or heartburn  · Genitourinary: No dysuria, trouble voiding, or hematuria  · Musculoskeletal:  None  · Integumentary: No rash or pruritis  · Neurological: No TIA or stroke symptoms  · Psychiatric: No anxiety or depression  · Endocrine: No malaise, fatigue or temperature intolerance  · Hematologic/Lymphatic: No bleeding problems, blood clots or swollen lymph nodes  · Allergic/Immunologic: No nasal congestion or hives    Objective:      Physical Exam:  /70   Pulse 60   Ht 5' 8\" (1.727 m)   Wt 154 lb 9.6 oz (70.1 kg)   BMI 23.51 kg/m²   Wt Readings from Last 3 Encounters:   20 154 lb 9.6 oz (70.1 kg)   19 162 lb 7.7 oz (73.7 kg)   19 189 lb (85.7 kg)     Body mass index is 23.51 kg/m². Vitals:    20 1527   BP: 110/70   Pulse: 60        General Appearance:  No distress, conversant  Constitutional:  Well developed, Well nourished, No acute distress, Non-toxic appearance. HENT:  Normocephalic, Atraumatic, Bilateral external ears normal, Oropharynx moist, No oral exudates, Nose normal. Neck- Normal range of motion, No tenderness, Supple, No stridor,no apical-carotid delay  Eyes:  PERRL, EOMI, Conjunctiva normal, No discharge. Respiratory:  Normal breath sounds, No respiratory distress, No wheezing, No chest tenderness. ,no use of accessory muscles, NO crackles  Cardiovascular: (PMI) apex non displaced,no lifts no thrills,S1 and S2 audible, No added heart sounds, No signs of ankle edema, or volume overload, No evidence of JVD, No crackles  GI:  Bowel sounds normal, Soft, No tenderness, No masses, No gross visceromegaly   :  No costovertebral angle tenderness   Musculoskeletal:  No edema, no tenderness, no deformities. Back- no tenderness  Integument:  Well hydrated, no rash   Lymphatic:  No lymphadenopathy noted   Neurologic:  Alert & oriented x 3, CN 2-12 normal, normal motor function, normal sensory function, no focal deficits noted   Psychiatric:  Speech and behavior appropriate       Medical decision making and Data review:  DATA:  Lab Results   Component Value Date    TROPONINT <0.010 11/15/2019     BNP:    Lab Results   Component Value Date    PROBNP 7,022 (H) 11/20/2019     PT/INR:  No results found for: PTINR  Lab Results   Component Value Date    LABA1C 5.2 11/15/2019    LABA1C 5.5 03/02/2018     Lab Results   Component Value Date    CHOL 58 11/15/2019    TRIG 42 11/15/2019    HDL 34 (L) 11/15/2019    LDLDIRECT 16 11/15/2019     Lab Results   Component Value Date    ALT 10 11/14/2019    AST 24 11/14/2019     TSH: No results found for: TSH  Lab Results   Component Value Date    AST 24 11/14/2019    ALT 10 11/14/2019    BILIDIR 0.2 03/05/2018    BILITOT 0.3 11/14/2019    ALKPHOS 108 11/14/2019     Lab Results   Component Value Date    PROBNP 7,022 (H) 11/20/2019    PROBNP 18,121 (H) 11/17/2019    PROBNP 9,746 (H) 11/14/2019     Lab Results   Component Value Date    LABA1C 5.2 11/15/2019    LABA1C 5.5 03/02/2018     Lab Results   Component Value Date    WBC 5.6 12/10/2019    HGB 8.7 (L) 12/10/2019    HCT 32.4 (L) 12/10/2019     12/10/2019     Echo  3/2/19   Summary   Left ventricular function is moderately abnormal, EF is estimated at 35-40   %. Grade II diastolic dysfunction. Mild left ventricular hypertrophy.    Apical wall is akinetic , Thrombus cannot be ruled out   Structurally continue only aspirin 81 mg daily    Chronic combined systolic and diastolic heart failure (HCC)  Moderately reduced EF of 30-35  % . He appears  euvolemic . all cardiac medication were stopped will start Toprol-XL . Continue midodrine and pyridostigmine due to orthostasis. Ideally should not be drinking alcohol at all    Ischemic cardiomyopathy   continue medical management . At this point there is no plans for ICD given his overall status palliative care is the option we will continue to treat him conservatively         Dyslipidemia :  All available lab work was reviewed. Patient was advised to repeat lab work before next visit      Counseled extensively and medication compliance urged. We discussed that for the  prevention of ASCVD our  goal is aggressive risk modification. Patient is encouraged to exercise even a brisk walk for 30 minutes  at least 3 to 4 times a week   Various goals were discussed and questions answered. Continue current medications. Appropriate prescriptions are addressed and refills ordered. Questions answered and patient verbalizes understanding. Call for any problems, questions, or concerns. Continue all other medications of all above medical condition listed as is. Return in about 1 month (around 2/13/2020). Please note this report has been partially produced using speech recognition software and may contain errors related to that system including errors in grammar, punctuation, and spelling, as well as words and phrases that may be inappropriate.  If there are any questions or concerns please feel free to contact the dictating provider for clarification.

## 2020-02-18 ENCOUNTER — OFFICE VISIT (OUTPATIENT)
Dept: CARDIOLOGY CLINIC | Age: 85
End: 2020-02-18
Payer: MEDICARE

## 2020-02-18 VITALS
WEIGHT: 147.2 LBS | BODY MASS INDEX: 22.31 KG/M2 | HEIGHT: 68 IN | HEART RATE: 72 BPM | DIASTOLIC BLOOD PRESSURE: 60 MMHG | SYSTOLIC BLOOD PRESSURE: 98 MMHG

## 2020-02-18 PROBLEM — I34.0 NONRHEUMATIC MITRAL VALVE REGURGITATION: Status: ACTIVE | Noted: 2020-02-18

## 2020-02-18 PROCEDURE — G8427 DOCREV CUR MEDS BY ELIG CLIN: HCPCS | Performed by: INTERNAL MEDICINE

## 2020-02-18 PROCEDURE — 1123F ACP DISCUSS/DSCN MKR DOCD: CPT | Performed by: INTERNAL MEDICINE

## 2020-02-18 PROCEDURE — G8484 FLU IMMUNIZE NO ADMIN: HCPCS | Performed by: INTERNAL MEDICINE

## 2020-02-18 PROCEDURE — 4040F PNEUMOC VAC/ADMIN/RCVD: CPT | Performed by: INTERNAL MEDICINE

## 2020-02-18 PROCEDURE — 99214 OFFICE O/P EST MOD 30 MIN: CPT | Performed by: INTERNAL MEDICINE

## 2020-02-18 PROCEDURE — 1036F TOBACCO NON-USER: CPT | Performed by: INTERNAL MEDICINE

## 2020-02-18 PROCEDURE — G8420 CALC BMI NORM PARAMETERS: HCPCS | Performed by: INTERNAL MEDICINE

## 2020-02-18 RX ORDER — AMITRIPTYLINE HYDROCHLORIDE 25 MG/1
25 TABLET, FILM COATED ORAL NIGHTLY
Status: ON HOLD | COMMUNITY
End: 2020-04-02 | Stop reason: HOSPADM

## 2020-02-18 RX ORDER — ENALAPRIL MALEATE 5 MG/1
5 TABLET ORAL DAILY
Status: ON HOLD | COMMUNITY
End: 2020-04-02 | Stop reason: HOSPADM

## 2020-02-18 RX ORDER — ISOSORBIDE MONONITRATE 30 MG/1
30 TABLET, EXTENDED RELEASE ORAL DAILY
Status: ON HOLD | COMMUNITY
End: 2020-04-23 | Stop reason: HOSPADM

## 2020-02-18 NOTE — PROGRESS NOTES
CARDIOLOGY  NOTE    Chief Complaint: CAD /Ischemic cardiomyopathy    HPI:   Fay Floyd is a 81y.o. year old who has history as noted below. HE is here with daughter who feels he is getting better , strength is better   He is still quite confused he is here with his daughter who reports he has been having a lot of memory issues he is out of rehab and back at home now. Still drinking few beers a day . We have bee  Pursuing palliative care and management. His sons take care of his meds but he is not compliant with diet . He gest dizzy once [place] while  He was actually admitted in November 2019 with congestive heart failure and encephalopathy. All  cardiac meds were stopped he was in rehab and he was started on midodrine and t blood pressures are better now he has mild ankle swelling but there is no history of chest pain he has history of ischemic cardiomyopathy with ejection fraction of 30 to 35%   He still consumes alcohol every day but states that he is drinking only 1-2 beers a day now  He has significant coronary artery disease he was recommended for CABG but considered too high risk. He opted to continue medical management PCI is a possibility but he is non compliant with medication  He was  admitted in the hospital in March 2019 when he was found to have significant native vessel disease. Advised to undergo CABG but after extensive discussion medical management was opted he was readmitted in November 2019 and then sent to rehab  HE has ischemic cardiomyopathy with ejection fraction of 30%. Domonique Fajardo  Unfortunately, he also consumes a lot of alcohol therefore there was a question whether he has ischemic or alcoholic cardiomyopathy    Current Outpatient Medications   Medication Sig Dispense Refill    amitriptyline (ELAVIL) 25 MG tablet Take 25 mg by mouth nightly      enalapril (VASOTEC) 5 MG tablet Take 5 mg by mouth daily      isosorbide mononitrate (IMDUR) 30 MG extended release tablet Take 30 mg by mouth daily      metoprolol succinate (TOPROL XL) 25 MG extended release tablet Take 1 tablet by mouth daily 30 tablet 3    aspirin EC 81 MG EC tablet Take 1 tablet by mouth daily 90 tablet 1    pyridostigmine (MESTINON) 60 MG tablet Take 60 mg by mouth 3 times daily      furosemide (LASIX) 40 MG tablet Take 1 tablet by mouth daily 60 tablet 3    ferrous sulfate 325 (65 Fe) MG tablet Take 1 tablet by mouth 2 times daily (with meals) 30 tablet 3    folic acid (FOLVITE) 1 MG tablet Take 1 tablet by mouth daily (Patient taking differently: Take 2 mg by mouth daily 2 tablets daily) 30 tablet 3    midodrine (PROAMATINE) 5 MG tablet Take 1 tablet by mouth 3 times daily (with meals) (Patient taking differently: Take 10 mg by mouth 3 times daily (with meals) ) 90 tablet 3    atorvastatin (LIPITOR) 40 MG tablet Take 1 tablet by mouth nightly 30 tablet 3     No current facility-administered medications for this visit. Allergies:   Patient has no known allergies. 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 No aprical clott.  H/O percutaneous left heart catheterization 03/02/2019    Severe multivessel disease.    [facility] OTHER MEDICAL     Primary Care Physician Is Dr. Arlen Bradshaw    Hypertension     Malignant neoplasm of kidney [facility]) Right    MI (myocardial infarction) ([facility] [address] 03/02/2019    NSTEMI    Nerve damage     \"Both Feet\"    Renal mass 2012    right    UTI (urinary tract infection)     Last One 2-13    Wears glasses     \"Just To Read\"     Past Surgical History:   Procedure Laterality Date    COLECTOMY  7-24-12    \"Took 1.5 Foot Large Colon\", Appearance:  No distress, conversant  Constitutional:  Well developed, Well nourished, No acute distress, Non-toxic appearance. HENT:  Normocephalic, Atraumatic, Bilateral external ears normal, Oropharynx moist, No oral exudates, Nose normal. Neck- Normal range of motion, No tenderness, Supple, No stridor,no apical-carotid delay  Eyes:  PERRL, EOMI, Conjunctiva normal, No discharge. Respiratory:  Normal breath sounds, No respiratory distress, No wheezing, No chest tenderness. ,no use of accessory muscles, NO crackles  Cardiovascular: (PMI) apex non displaced,no lifts no thrills,S1 and S2 audible, No added heart sounds, 1+ ankle edema, or volume overload, No evidence of JVD, No crackles  GI:  Bowel sounds normal, Soft, No tenderness, No masses, No gross visceromegaly   :  No costovertebral angle tenderness   Musculoskeletal:  No edema, no tenderness, no deformities.  Back- no tenderness  Integument:  Well hydrated, no rash   Lymphatic:  No lymphadenopathy noted   Neurologic:  Alert & oriented x 3, CN 2-12 normal, normal motor function, normal sensory function, no focal deficits noted   Psychiatric:  Speech and behavior appropriate       Medical decision making and Data review:  DATA:  Lab Results   Component Value Date    TROPONINT <0.010 11/15/2019     BNP:    Lab Results   Component Value Date    PROBNP 7,022 (H) 11/20/2019     PT/INR:  No results found for: PTINR  Lab Results   Component Value Date    LABA1C 5.2 11/15/2019    LABA1C 5.5 03/02/2018     Lab Results   Component Value Date    CHOL 58 11/15/2019    TRIG 42 11/15/2019    HDL 34 (L) 11/15/2019    LDLDIRECT 16 11/15/2019     Lab Results   Component Value Date    ALT 10 11/14/2019    AST 24 11/14/2019     TSH: No results found for: TSH  Lab Results   Component Value Date    AST 24 11/14/2019    ALT 10 11/14/2019    BILIDIR 0.2 03/05/2018    BILITOT 0.3 11/14/2019    ALKPHOS 108 11/14/2019     Lab Results   Component Value Date    PROBNP 7,022 (H) diastolic CHF, acute on chronic (HCC)    7. Nonrheumatic mitral valve regurgitation         ASCVD (arteriosclerotic cardiovascular disease)  We are planning palliative care for him ,HE has Multivessel disease with LAD  and circumflex diffusely diseased RCA also has 99% stenosis. Medical management was opted decided not to pursue it CABG will continue medical management for now. No angina will continue Toprol-XL he can stop Plavix continue only aspirin 81 mg daily    Chronic combined systolic and diastolic heart failure (HCC)  Moderately reduced EF of 30-35  % . He appears  euvolemic . all cardiac medication were stopped will start Toprol-XL . Continue midodrine and pyridostigmine due to orthostasis. Ideally should not be drinking alcohol at all he hector snot like to be told about alcohol reduction     Ischemic cardiomyopathy  Continue medical management . At this point there is no plans for ICD given his overall status palliative care is the option we will continue to treat him conservatively    Mitral regurgitation  Severe , continue to manage conservatively        Dyslipidemia :  All available lab work was reviewed. Patient was advised to repeat lab work before next visit      Counseled extensively and medication compliance urged. We discussed that for the  prevention of ASCVD our  goal is aggressive risk modification. Patient is encouraged to exercise even a brisk walk for 30 minutes  at least 3 to 4 times a week   Various goals were discussed and questions answered. Continue current medications. Appropriate prescriptions are addressed and refills ordered. Questions answered and patient verbalizes understanding. Call for any problems, questions, or concerns. Continue all other medications of all above medical condition listed as is. Return in about 6 months (around 8/18/2020).     Please note this report has been partially produced using speech recognition software and may contain errors related to that system including errors in grammar, punctuation, and spelling, as well as words and phrases that may be inappropriate.  If there are any questions or concerns please feel free to contact the dictating provider for clarification.

## 2020-03-24 ENCOUNTER — APPOINTMENT (OUTPATIENT)
Dept: CT IMAGING | Age: 85
DRG: 948 | End: 2020-03-24
Payer: MEDICARE

## 2020-03-24 ENCOUNTER — HOSPITAL ENCOUNTER (OUTPATIENT)
Age: 85
Setting detail: OBSERVATION
Discharge: NURSING, REHAB OR LONG-TERM CARE | DRG: 948 | End: 2020-03-25
Attending: EMERGENCY MEDICINE | Admitting: HOSPITALIST
Payer: MEDICARE

## 2020-03-24 ENCOUNTER — APPOINTMENT (OUTPATIENT)
Dept: GENERAL RADIOLOGY | Age: 85
DRG: 948 | End: 2020-03-24
Payer: MEDICARE

## 2020-03-24 PROBLEM — R53.1 GENERALIZED WEAKNESS: Status: ACTIVE | Noted: 2020-03-24

## 2020-03-24 LAB
ALBUMIN SERPL-MCNC: 3.8 GM/DL (ref 3.4–5)
ALP BLD-CCNC: 88 IU/L (ref 40–129)
ALT SERPL-CCNC: 12 U/L (ref 10–40)
ANION GAP SERPL CALCULATED.3IONS-SCNC: 13 MMOL/L (ref 4–16)
AST SERPL-CCNC: 20 IU/L (ref 15–37)
BACTERIA: NEGATIVE /HPF
BASOPHILS ABSOLUTE: 0 K/CU MM
BASOPHILS RELATIVE PERCENT: 0.6 % (ref 0–1)
BILIRUB SERPL-MCNC: 0.1 MG/DL (ref 0–1)
BILIRUBIN URINE: NEGATIVE MG/DL
BLOOD, URINE: ABNORMAL
BUN BLDV-MCNC: 32 MG/DL (ref 6–23)
CALCIUM SERPL-MCNC: 8.5 MG/DL (ref 8.3–10.6)
CHLORIDE BLD-SCNC: 103 MMOL/L (ref 99–110)
CLARITY: CLEAR
CO2: 22 MMOL/L (ref 21–32)
COLOR: YELLOW
CREAT SERPL-MCNC: 1.5 MG/DL (ref 0.9–1.3)
DIFFERENTIAL TYPE: ABNORMAL
EOSINOPHILS ABSOLUTE: 0.1 K/CU MM
EOSINOPHILS RELATIVE PERCENT: 1.8 % (ref 0–3)
GFR AFRICAN AMERICAN: 54 ML/MIN/1.73M2
GFR NON-AFRICAN AMERICAN: 45 ML/MIN/1.73M2
GLUCOSE BLD-MCNC: 70 MG/DL (ref 70–99)
GLUCOSE, URINE: NEGATIVE MG/DL
HCT VFR BLD CALC: 25.1 % (ref 42–52)
HEMOGLOBIN: 7.5 GM/DL (ref 13.5–18)
HYALINE CASTS: 10 /LPF
IMMATURE NEUTROPHIL %: 0.3 % (ref 0–0.43)
KETONES, URINE: NEGATIVE MG/DL
LEUKOCYTE ESTERASE, URINE: NEGATIVE
LIPASE: 134 IU/L (ref 13–60)
LYMPHOCYTES ABSOLUTE: 1.2 K/CU MM
LYMPHOCYTES RELATIVE PERCENT: 16.4 % (ref 24–44)
MCH RBC QN AUTO: 26.4 PG (ref 27–31)
MCHC RBC AUTO-ENTMCNC: 29.9 % (ref 32–36)
MCV RBC AUTO: 88.4 FL (ref 78–100)
MONOCYTES ABSOLUTE: 0.6 K/CU MM
MONOCYTES RELATIVE PERCENT: 7.9 % (ref 0–4)
MUCUS: ABNORMAL HPF
NITRITE URINE, QUANTITATIVE: NEGATIVE
NUCLEATED RBC %: 0 %
PDW BLD-RTO: 13.6 % (ref 11.7–14.9)
PH, URINE: 5 (ref 5–8)
PLATELET # BLD: 257 K/CU MM (ref 140–440)
PMV BLD AUTO: 10 FL (ref 7.5–11.1)
POTASSIUM SERPL-SCNC: 4.7 MMOL/L (ref 3.5–5.1)
PROTEIN UA: NEGATIVE MG/DL
RBC # BLD: 2.84 M/CU MM (ref 4.6–6.2)
RBC URINE: 10 /HPF (ref 0–3)
SEGMENTED NEUTROPHILS ABSOLUTE COUNT: 5.2 K/CU MM
SEGMENTED NEUTROPHILS RELATIVE PERCENT: 73 % (ref 36–66)
SODIUM BLD-SCNC: 138 MMOL/L (ref 135–145)
SPECIFIC GRAVITY UA: 1.01 (ref 1–1.03)
SQUAMOUS EPITHELIAL: <1 /HPF
TOTAL IMMATURE NEUTOROPHIL: 0.02 K/CU MM
TOTAL NUCLEATED RBC: 0 K/CU MM
TOTAL PROTEIN: 5.7 GM/DL (ref 6.4–8.2)
TRICHOMONAS: ABNORMAL /HPF
TROPONIN T: <0.01 NG/ML
TSH HIGH SENSITIVITY: 1.69 UIU/ML (ref 0.27–4.2)
UROBILINOGEN, URINE: NORMAL MG/DL (ref 0.2–1)
WBC # BLD: 7.1 K/CU MM (ref 4–10.5)
WBC UA: ABNORMAL /HPF (ref 0–2)

## 2020-03-24 PROCEDURE — 90715 TDAP VACCINE 7 YRS/> IM: CPT | Performed by: EMERGENCY MEDICINE

## 2020-03-24 PROCEDURE — 71045 X-RAY EXAM CHEST 1 VIEW: CPT

## 2020-03-24 PROCEDURE — G0378 HOSPITAL OBSERVATION PER HR: HCPCS

## 2020-03-24 PROCEDURE — 84484 ASSAY OF TROPONIN QUANT: CPT

## 2020-03-24 PROCEDURE — 99285 EMERGENCY DEPT VISIT HI MDM: CPT

## 2020-03-24 PROCEDURE — 96360 HYDRATION IV INFUSION INIT: CPT

## 2020-03-24 PROCEDURE — 84443 ASSAY THYROID STIM HORMONE: CPT

## 2020-03-24 PROCEDURE — 80053 COMPREHEN METABOLIC PANEL: CPT

## 2020-03-24 PROCEDURE — 85025 COMPLETE CBC W/AUTO DIFF WBC: CPT

## 2020-03-24 PROCEDURE — 83690 ASSAY OF LIPASE: CPT

## 2020-03-24 PROCEDURE — 94761 N-INVAS EAR/PLS OXIMETRY MLT: CPT

## 2020-03-24 PROCEDURE — 6370000000 HC RX 637 (ALT 250 FOR IP): Performed by: EMERGENCY MEDICINE

## 2020-03-24 PROCEDURE — 6360000002 HC RX W HCPCS: Performed by: EMERGENCY MEDICINE

## 2020-03-24 PROCEDURE — 70450 CT HEAD/BRAIN W/O DYE: CPT

## 2020-03-24 PROCEDURE — 93005 ELECTROCARDIOGRAM TRACING: CPT | Performed by: EMERGENCY MEDICINE

## 2020-03-24 PROCEDURE — 6370000000 HC RX 637 (ALT 250 FOR IP): Performed by: NURSE PRACTITIONER

## 2020-03-24 PROCEDURE — 90471 IMMUNIZATION ADMIN: CPT | Performed by: EMERGENCY MEDICINE

## 2020-03-24 PROCEDURE — 81001 URINALYSIS AUTO W/SCOPE: CPT

## 2020-03-24 PROCEDURE — 2580000003 HC RX 258: Performed by: NURSE PRACTITIONER

## 2020-03-24 RX ORDER — SODIUM CHLORIDE 0.9 % (FLUSH) 0.9 %
10 SYRINGE (ML) INJECTION EVERY 12 HOURS SCHEDULED
Status: DISCONTINUED | OUTPATIENT
Start: 2020-03-24 | End: 2020-03-25 | Stop reason: HOSPADM

## 2020-03-24 RX ORDER — METOPROLOL SUCCINATE 25 MG/1
25 TABLET, EXTENDED RELEASE ORAL DAILY
Status: CANCELLED | OUTPATIENT
Start: 2020-03-24

## 2020-03-24 RX ORDER — POLYETHYLENE GLYCOL 3350 17 G/17G
17 POWDER, FOR SOLUTION ORAL DAILY PRN
Status: DISCONTINUED | OUTPATIENT
Start: 2020-03-24 | End: 2020-03-25 | Stop reason: HOSPADM

## 2020-03-24 RX ORDER — PYRIDOSTIGMINE BROMIDE 60 MG/1
60 TABLET ORAL 3 TIMES DAILY
Status: DISCONTINUED | OUTPATIENT
Start: 2020-03-24 | End: 2020-03-25 | Stop reason: HOSPADM

## 2020-03-24 RX ORDER — SODIUM CHLORIDE 9 MG/ML
INJECTION, SOLUTION INTRAVENOUS CONTINUOUS
Status: DISCONTINUED | OUTPATIENT
Start: 2020-03-24 | End: 2020-03-25

## 2020-03-24 RX ORDER — SODIUM CHLORIDE 0.9 % (FLUSH) 0.9 %
10 SYRINGE (ML) INJECTION PRN
Status: DISCONTINUED | OUTPATIENT
Start: 2020-03-24 | End: 2020-03-25 | Stop reason: HOSPADM

## 2020-03-24 RX ORDER — ISOSORBIDE MONONITRATE 30 MG/1
30 TABLET, EXTENDED RELEASE ORAL DAILY
Status: DISCONTINUED | OUTPATIENT
Start: 2020-03-25 | End: 2020-03-25 | Stop reason: HOSPADM

## 2020-03-24 RX ORDER — MIDODRINE HYDROCHLORIDE 5 MG/1
10 TABLET ORAL ONCE
Status: COMPLETED | OUTPATIENT
Start: 2020-03-24 | End: 2020-03-24

## 2020-03-24 RX ORDER — ASPIRIN 81 MG/1
81 TABLET ORAL DAILY
Status: CANCELLED | OUTPATIENT
Start: 2020-03-24

## 2020-03-24 RX ORDER — MIDODRINE HYDROCHLORIDE 5 MG/1
10 TABLET ORAL
Status: DISCONTINUED | OUTPATIENT
Start: 2020-03-25 | End: 2020-03-25 | Stop reason: HOSPADM

## 2020-03-24 RX ORDER — SODIUM CHLORIDE 9 MG/ML
INJECTION, SOLUTION INTRAVENOUS CONTINUOUS
Status: DISCONTINUED | OUTPATIENT
Start: 2020-03-24 | End: 2020-03-24

## 2020-03-24 RX ORDER — ONDANSETRON 2 MG/ML
4 INJECTION INTRAMUSCULAR; INTRAVENOUS EVERY 6 HOURS PRN
Status: DISCONTINUED | OUTPATIENT
Start: 2020-03-24 | End: 2020-03-25 | Stop reason: HOSPADM

## 2020-03-24 RX ORDER — ACETAMINOPHEN 325 MG/1
650 TABLET ORAL EVERY 6 HOURS PRN
Status: DISCONTINUED | OUTPATIENT
Start: 2020-03-24 | End: 2020-03-25 | Stop reason: HOSPADM

## 2020-03-24 RX ORDER — PROMETHAZINE HYDROCHLORIDE 12.5 MG/1
12.5 TABLET ORAL EVERY 6 HOURS PRN
Status: DISCONTINUED | OUTPATIENT
Start: 2020-03-24 | End: 2020-03-25 | Stop reason: HOSPADM

## 2020-03-24 RX ORDER — ACETAMINOPHEN 650 MG/1
650 SUPPOSITORY RECTAL EVERY 6 HOURS PRN
Status: DISCONTINUED | OUTPATIENT
Start: 2020-03-24 | End: 2020-03-25 | Stop reason: HOSPADM

## 2020-03-24 RX ADMIN — SODIUM CHLORIDE: 9 INJECTION, SOLUTION INTRAVENOUS at 23:31

## 2020-03-24 RX ADMIN — PYRIDOSTIGMINE BROMIDE 60 MG: 60 TABLET ORAL at 23:30

## 2020-03-24 RX ADMIN — SODIUM CHLORIDE, PRESERVATIVE FREE 10 ML: 5 INJECTION INTRAVENOUS at 23:30

## 2020-03-24 RX ADMIN — MIDODRINE HYDROCHLORIDE 10 MG: 5 TABLET ORAL at 18:41

## 2020-03-24 RX ADMIN — TETANUS TOXOID, REDUCED DIPHTHERIA TOXOID AND ACELLULAR PERTUSSIS VACCINE, ADSORBED 0.5 ML: 5; 2.5; 8; 8; 2.5 SUSPENSION INTRAMUSCULAR at 18:41

## 2020-03-24 ASSESSMENT — ENCOUNTER SYMPTOMS
NAUSEA: 0
VOMITING: 0
BACK PAIN: 0
ABDOMINAL PAIN: 0
SHORTNESS OF BREATH: 0
COUGH: 0
RHINORRHEA: 1
SORE THROAT: 0
EYE REDNESS: 0

## 2020-03-24 ASSESSMENT — PAIN SCALES - GENERAL: PAINLEVEL_OUTOF10: 0

## 2020-03-24 NOTE — ED PROVIDER NOTES
PRN Sofi Ham, APRN - CNP        promethazine (PHENERGAN) tablet 12.5 mg  12.5 mg Oral Q6H PRN Sofi Ham, APRN - CNP        Or    ondansetron [facility]) injection 4 mg  4 mg Intravenous Q6H PRN Sofi Yudim, APRN - CNP        isosorbide mononitrate (IMDUR) extended release tablet 30 mg  30 mg Oral Daily Sofi Ham, APRN - CNP   30 mg at 03/25/20 0951    midodrine (PROAMATINE) tablet 10 mg  10 mg Oral TID WC Sofi Ham, APRN - CNP   10 mg at 03/25/20 0951    pyridostigmine (MESTINON) tablet 60 mg  60 mg Oral TID Sofi Ham, APRN - CNP   60 mg at 03/25/20 0951     No Known Allergies    Nursing Notes Reviewed     Physical Exam:   ED Triage Vitals [03/24/20 1651]   Enc Vitals Group      BP (!) 87/46      Pulse 76      Resp 18      Temp 97.5 °F (36.4 °C)      Temp Source Oral      SpO2 99 %      Weight       Height       Head Circumference       Peak Flow       Pain Score       Pain Loc       Pain Edu? Excl. in [address]? /62   Pulse 67   Temp 97.8 °F (36.6 °C) (Oral)   Resp 17   Ht 5' 8\" (1.727 m)   Wt 138 lb 12.8 oz (63 kg)   SpO2 100%   BMI 21.10 kg/m²   My pulse ox interpretation is - normal  Physical Exam  Constitutional:       General: He is not in acute distress. Appearance: Normal appearance. He is not toxic-appearing or diaphoretic. Comments: Elderly appearing     HENT:      Head: Normocephalic and atraumatic. Eyes:      General:         Right eye: No discharge. Left eye: No discharge. Conjunctiva/sclera: Conjunctivae normal.   Cardiovascular:      Rate and Rhythm: Normal rate and regular rhythm. Pulses: Normal pulses. Radial pulses are 2+ on the right side and 2+ on the left side. Pulmonary:      Effort: Pulmonary effort is normal. No respiratory distress. Breath sounds: Normal breath sounds. No wheezing or rales. Abdominal:      General: There is no distension. Tenderness: There is no abdominal tenderness.    Musculoskeletal: Normal 16   CBC auto differential   Result Value Ref Range    WBC 5.4 4.0 - 10.5 K/CU MM    RBC 3.15 (L) 4.6 - 6.2 M/CU MM    Hemoglobin 8.2 (L) 13.5 - 18.0 GM/DL    Hematocrit 27.5 (L) 42 - 52 %    MCV 87.3 78 - 100 FL    MCH 26.0 (L) 27 - 31 PG    MCHC 29.8 (L) 32.0 - 36.0 %    RDW 13.8 11.7 - 14.9 %    Platelets 302 566 - 825 K/CU MM    MPV 10.3 7.5 - 11.1 FL    Differential Type AUTOMATED DIFFERENTIAL     Segs Relative 61.7 36 - 66 %    Lymphocytes % 23.4 (L) 24 - 44 %    Monocytes % 10.6 (H) 0 - 4 %    Eosinophils % 3.3 (H) 0 - 3 %    Basophils % 0.6 0 - 1 %    Segs Absolute 3.3 K/CU MM    Lymphocytes Absolute 1.3 K/CU MM    Monocytes Absolute 0.6 K/CU MM    Eosinophils Absolute 0.2 K/CU MM    Basophils Absolute 0.0 K/CU MM    Nucleated RBC % 0.0 %    Total Nucleated RBC 0.0 K/CU MM    Total Immature Neutrophil 0.02 K/CU MM    Immature Neutrophil % 0.4 0 - 0.43 %   Hemoglobin and hematocrit, blood   Result Value Ref Range    Hemoglobin 8.1 (L) 13.5 - 18.0 GM/DL    Hematocrit 27.8 (L) 42 - 52 %   EKG 12 Lead   Result Value Ref Range    Ventricular Rate 72 BPM    Atrial Rate 72 BPM    P-R Interval 162 ms    QRS Duration 88 ms    Q-T Interval 406 ms    QTc Calculation (Bazett) 444 ms    P Axis 67 degrees    R Axis 81 degrees    T Axis 45 degrees    Diagnosis       Sinus rhythm with premature supraventricular complexes  Possible Anterior infarct , age undetermined  Abnormal ECG  When compared with ECG of 18-NOV-2019 11:03,  Previous ECG has undetermined rhythm, needs review  Borderline criteria for Anterior infarct are now present  QT has shortened        Radiographs (if obtained):  [] The following radiograph was interpreted by myself in the absence of a radiologist:  [x]Radiologist's Report Reviewed:  VL DUP CAROTID BILATERAL   Final Result   The right internal carotid artery demonstrates 0-50% stenosis. The left internal carotid artery demonstrates 50-69% stenosis.       Bilateral vertebral arteries are patent with patient's satisfaction. Patient understood and agreed with plan. Clinical Impression:  1. Frequent falls    2. Orthostatic hypotension    3. Skin tear of right elbow without complication, initial encounter          Zoraida Norris MD       Please note that portions of this note may have been complete with a voice recognition program.  Effortswere made to edit the dictations, but occasional words are mis-transcribed.           Zoraida Norris MD  03/25/20 [id number]

## 2020-03-25 ENCOUNTER — APPOINTMENT (OUTPATIENT)
Dept: ULTRASOUND IMAGING | Age: 85
DRG: 948 | End: 2020-03-25
Payer: MEDICARE

## 2020-03-25 ENCOUNTER — HOSPITAL ENCOUNTER (INPATIENT)
Age: 85
LOS: 9 days | Discharge: HOME HEALTH | DRG: 948 | End: 2020-04-03
Attending: PHYSICAL MEDICINE & REHABILITATION | Admitting: PHYSICAL MEDICINE & REHABILITATION
Payer: MEDICARE

## 2020-03-25 VITALS
TEMPERATURE: 97.8 F | OXYGEN SATURATION: 100 % | DIASTOLIC BLOOD PRESSURE: 60 MMHG | HEART RATE: 67 BPM | HEIGHT: 68 IN | WEIGHT: 138.8 LBS | BODY MASS INDEX: 21.04 KG/M2 | RESPIRATION RATE: 16 BRPM | SYSTOLIC BLOOD PRESSURE: 102 MMHG

## 2020-03-25 PROBLEM — R53.81 DEBILITY: Status: ACTIVE | Noted: 2020-03-25

## 2020-03-25 LAB
ALBUMIN SERPL-MCNC: 3.7 GM/DL (ref 3.4–5)
ALP BLD-CCNC: 96 IU/L (ref 40–128)
ALT SERPL-CCNC: 13 U/L (ref 10–40)
ANION GAP SERPL CALCULATED.3IONS-SCNC: 10 MMOL/L (ref 4–16)
AST SERPL-CCNC: 21 IU/L (ref 15–37)
BASOPHILS ABSOLUTE: 0 K/CU MM
BASOPHILS RELATIVE PERCENT: 0.6 % (ref 0–1)
BILIRUB SERPL-MCNC: 0.2 MG/DL (ref 0–1)
BUN BLDV-MCNC: 29 MG/DL (ref 6–23)
CALCIUM SERPL-MCNC: 8.6 MG/DL (ref 8.3–10.6)
CHLORIDE BLD-SCNC: 104 MMOL/L (ref 99–110)
CO2: 24 MMOL/L (ref 21–32)
CREAT SERPL-MCNC: 1.3 MG/DL (ref 0.9–1.3)
DIFFERENTIAL TYPE: ABNORMAL
EOSINOPHILS ABSOLUTE: 0.2 K/CU MM
EOSINOPHILS RELATIVE PERCENT: 3.3 % (ref 0–3)
GFR AFRICAN AMERICAN: >60 ML/MIN/1.73M2
GFR NON-AFRICAN AMERICAN: 53 ML/MIN/1.73M2
GLUCOSE BLD-MCNC: 70 MG/DL (ref 70–99)
HCT VFR BLD CALC: 26.1 % (ref 42–52)
HCT VFR BLD CALC: 27.5 % (ref 42–52)
HCT VFR BLD CALC: 27.8 % (ref 42–52)
HEMOGLOBIN: 7.2 GM/DL (ref 13.5–18)
HEMOGLOBIN: 8.1 GM/DL (ref 13.5–18)
HEMOGLOBIN: 8.2 GM/DL (ref 13.5–18)
IMMATURE NEUTROPHIL %: 0.4 % (ref 0–0.43)
IRON: 18 UG/DL (ref 59–158)
IRON: 20 UG/DL (ref 59–158)
LYMPHOCYTES ABSOLUTE: 1.3 K/CU MM
LYMPHOCYTES RELATIVE PERCENT: 23.4 % (ref 24–44)
MCH RBC QN AUTO: 26 PG (ref 27–31)
MCHC RBC AUTO-ENTMCNC: 29.8 % (ref 32–36)
MCV RBC AUTO: 87.3 FL (ref 78–100)
MONOCYTES ABSOLUTE: 0.6 K/CU MM
MONOCYTES RELATIVE PERCENT: 10.6 % (ref 0–4)
NUCLEATED RBC %: 0 %
PCT TRANSFERRIN: 6 % (ref 10–44)
PCT TRANSFERRIN: 7 % (ref 10–44)
PDW BLD-RTO: 13.8 % (ref 11.7–14.9)
PLATELET # BLD: 249 K/CU MM (ref 140–440)
PMV BLD AUTO: 10.3 FL (ref 7.5–11.1)
POTASSIUM SERPL-SCNC: 4.4 MMOL/L (ref 3.5–5.1)
RBC # BLD: 3.15 M/CU MM (ref 4.6–6.2)
SEGMENTED NEUTROPHILS ABSOLUTE COUNT: 3.3 K/CU MM
SEGMENTED NEUTROPHILS RELATIVE PERCENT: 61.7 % (ref 36–66)
SODIUM BLD-SCNC: 138 MMOL/L (ref 135–145)
TOTAL IMMATURE NEUTOROPHIL: 0.02 K/CU MM
TOTAL IRON BINDING CAPACITY: 293 UG/DL (ref 250–450)
TOTAL IRON BINDING CAPACITY: 311 UG/DL (ref 250–450)
TOTAL NUCLEATED RBC: 0 K/CU MM
TOTAL PROTEIN: 5.7 GM/DL (ref 6.4–8.2)
UNSATURATED IRON BINDING CAPACITY: 273 UG/DL (ref 110–370)
UNSATURATED IRON BINDING CAPACITY: 293 UG/DL (ref 110–370)
WBC # BLD: 5.4 K/CU MM (ref 4–10.5)

## 2020-03-25 PROCEDURE — 97116 GAIT TRAINING THERAPY: CPT

## 2020-03-25 PROCEDURE — 2580000003 HC RX 258: Performed by: NURSE PRACTITIONER

## 2020-03-25 PROCEDURE — 36415 COLL VENOUS BLD VENIPUNCTURE: CPT

## 2020-03-25 PROCEDURE — 97162 PT EVAL MOD COMPLEX 30 MIN: CPT

## 2020-03-25 PROCEDURE — 85025 COMPLETE CBC W/AUTO DIFF WBC: CPT

## 2020-03-25 PROCEDURE — 96372 THER/PROPH/DIAG INJ SC/IM: CPT

## 2020-03-25 PROCEDURE — 93010 ELECTROCARDIOGRAM REPORT: CPT | Performed by: INTERNAL MEDICINE

## 2020-03-25 PROCEDURE — 94761 N-INVAS EAR/PLS OXIMETRY MLT: CPT

## 2020-03-25 PROCEDURE — 1280000000 HC REHAB R&B

## 2020-03-25 PROCEDURE — 93880 EXTRACRANIAL BILAT STUDY: CPT

## 2020-03-25 PROCEDURE — 83550 IRON BINDING TEST: CPT

## 2020-03-25 PROCEDURE — 85014 HEMATOCRIT: CPT

## 2020-03-25 PROCEDURE — G0378 HOSPITAL OBSERVATION PER HR: HCPCS

## 2020-03-25 PROCEDURE — 99223 1ST HOSP IP/OBS HIGH 75: CPT | Performed by: PHYSICAL MEDICINE & REHABILITATION

## 2020-03-25 PROCEDURE — 85018 HEMOGLOBIN: CPT

## 2020-03-25 PROCEDURE — 80053 COMPREHEN METABOLIC PANEL: CPT

## 2020-03-25 PROCEDURE — 97166 OT EVAL MOD COMPLEX 45 MIN: CPT

## 2020-03-25 PROCEDURE — 96361 HYDRATE IV INFUSION ADD-ON: CPT

## 2020-03-25 PROCEDURE — 6360000002 HC RX W HCPCS: Performed by: FAMILY MEDICINE

## 2020-03-25 PROCEDURE — 83540 ASSAY OF IRON: CPT

## 2020-03-25 PROCEDURE — 6370000000 HC RX 637 (ALT 250 FOR IP): Performed by: FAMILY MEDICINE

## 2020-03-25 PROCEDURE — 6370000000 HC RX 637 (ALT 250 FOR IP): Performed by: NURSE PRACTITIONER

## 2020-03-25 PROCEDURE — 97535 SELF CARE MNGMENT TRAINING: CPT

## 2020-03-25 RX ORDER — MIDODRINE HYDROCHLORIDE 5 MG/1
10 TABLET ORAL
Status: CANCELLED | OUTPATIENT
Start: 2020-03-25

## 2020-03-25 RX ORDER — ATORVASTATIN CALCIUM 40 MG/1
40 TABLET, FILM COATED ORAL NIGHTLY
Status: DISCONTINUED | OUTPATIENT
Start: 2020-03-25 | End: 2020-04-03 | Stop reason: HOSPADM

## 2020-03-25 RX ORDER — ACETAMINOPHEN 650 MG/1
650 SUPPOSITORY RECTAL EVERY 6 HOURS PRN
Status: CANCELLED | OUTPATIENT
Start: 2020-03-25

## 2020-03-25 RX ORDER — ASPIRIN 81 MG/1
81 TABLET ORAL DAILY
Status: DISCONTINUED | OUTPATIENT
Start: 2020-03-26 | End: 2020-04-03 | Stop reason: HOSPADM

## 2020-03-25 RX ORDER — ISOSORBIDE MONONITRATE 30 MG/1
30 TABLET, EXTENDED RELEASE ORAL DAILY
Status: DISCONTINUED | OUTPATIENT
Start: 2020-03-26 | End: 2020-04-03 | Stop reason: HOSPADM

## 2020-03-25 RX ORDER — ACETAMINOPHEN 325 MG/1
650 TABLET ORAL EVERY 6 HOURS PRN
Status: CANCELLED | OUTPATIENT
Start: 2020-03-25

## 2020-03-25 RX ORDER — PYRIDOSTIGMINE BROMIDE 60 MG/1
60 TABLET ORAL 3 TIMES DAILY
Status: DISCONTINUED | OUTPATIENT
Start: 2020-03-25 | End: 2020-04-03 | Stop reason: HOSPADM

## 2020-03-25 RX ORDER — ATORVASTATIN CALCIUM 40 MG/1
40 TABLET, FILM COATED ORAL NIGHTLY
Status: DISCONTINUED | OUTPATIENT
Start: 2020-03-25 | End: 2020-03-25 | Stop reason: HOSPADM

## 2020-03-25 RX ORDER — ACETAMINOPHEN 325 MG/1
650 TABLET ORAL EVERY 6 HOURS PRN
Status: DISCONTINUED | OUTPATIENT
Start: 2020-03-25 | End: 2020-03-25

## 2020-03-25 RX ORDER — MIDODRINE HYDROCHLORIDE 5 MG/1
10 TABLET ORAL
Status: DISCONTINUED | OUTPATIENT
Start: 2020-03-25 | End: 2020-04-03 | Stop reason: HOSPADM

## 2020-03-25 RX ORDER — ACETAMINOPHEN 325 MG/1
650 TABLET ORAL EVERY 4 HOURS PRN
Status: DISCONTINUED | OUTPATIENT
Start: 2020-03-25 | End: 2020-04-03 | Stop reason: HOSPADM

## 2020-03-25 RX ORDER — POLYETHYLENE GLYCOL 3350 17 G/17G
17 POWDER, FOR SOLUTION ORAL DAILY PRN
Status: DISCONTINUED | OUTPATIENT
Start: 2020-03-25 | End: 2020-04-03 | Stop reason: HOSPADM

## 2020-03-25 RX ORDER — ASPIRIN 81 MG/1
81 TABLET ORAL DAILY
Status: CANCELLED | OUTPATIENT
Start: 2020-03-26

## 2020-03-25 RX ORDER — ACETAMINOPHEN 650 MG/1
650 SUPPOSITORY RECTAL EVERY 6 HOURS PRN
Status: DISCONTINUED | OUTPATIENT
Start: 2020-03-25 | End: 2020-03-25

## 2020-03-25 RX ORDER — ASPIRIN 81 MG/1
81 TABLET ORAL DAILY
Status: DISCONTINUED | OUTPATIENT
Start: 2020-03-25 | End: 2020-03-25 | Stop reason: HOSPADM

## 2020-03-25 RX ORDER — PYRIDOSTIGMINE BROMIDE 60 MG/1
60 TABLET ORAL 3 TIMES DAILY
Status: CANCELLED | OUTPATIENT
Start: 2020-03-25

## 2020-03-25 RX ORDER — PROMETHAZINE HYDROCHLORIDE 12.5 MG/1
12.5 TABLET ORAL EVERY 6 HOURS PRN
Status: CANCELLED | OUTPATIENT
Start: 2020-03-25

## 2020-03-25 RX ORDER — PROMETHAZINE HYDROCHLORIDE 25 MG/1
12.5 TABLET ORAL EVERY 6 HOURS PRN
Status: DISCONTINUED | OUTPATIENT
Start: 2020-03-25 | End: 2020-04-03 | Stop reason: HOSPADM

## 2020-03-25 RX ORDER — ATORVASTATIN CALCIUM 40 MG/1
40 TABLET, FILM COATED ORAL NIGHTLY
Status: CANCELLED | OUTPATIENT
Start: 2020-03-25

## 2020-03-25 RX ORDER — ISOSORBIDE MONONITRATE 30 MG/1
30 TABLET, EXTENDED RELEASE ORAL DAILY
Status: CANCELLED | OUTPATIENT
Start: 2020-03-26

## 2020-03-25 RX ORDER — POLYETHYLENE GLYCOL 3350 17 G/17G
17 POWDER, FOR SOLUTION ORAL DAILY PRN
Status: DISCONTINUED | OUTPATIENT
Start: 2020-03-25 | End: 2020-03-25 | Stop reason: SDUPTHER

## 2020-03-25 RX ORDER — ONDANSETRON 2 MG/ML
4 INJECTION INTRAMUSCULAR; INTRAVENOUS EVERY 6 HOURS PRN
Status: DISCONTINUED | OUTPATIENT
Start: 2020-03-25 | End: 2020-03-31

## 2020-03-25 RX ORDER — ONDANSETRON 2 MG/ML
4 INJECTION INTRAMUSCULAR; INTRAVENOUS EVERY 6 HOURS PRN
Status: CANCELLED | OUTPATIENT
Start: 2020-03-25

## 2020-03-25 RX ORDER — POLYETHYLENE GLYCOL 3350 17 G/17G
17 POWDER, FOR SOLUTION ORAL DAILY PRN
Status: CANCELLED | OUTPATIENT
Start: 2020-03-25

## 2020-03-25 RX ADMIN — ISOSORBIDE MONONITRATE 30 MG: 30 TABLET, EXTENDED RELEASE ORAL at 09:51

## 2020-03-25 RX ADMIN — PYRIDOSTIGMINE BROMIDE 60 MG: 60 TABLET ORAL at 20:39

## 2020-03-25 RX ADMIN — PYRIDOSTIGMINE BROMIDE 60 MG: 60 TABLET ORAL at 14:30

## 2020-03-25 RX ADMIN — ATORVASTATIN CALCIUM 40 MG: 40 TABLET, FILM COATED ORAL at 20:39

## 2020-03-25 RX ADMIN — ASPIRIN 81 MG: 81 TABLET, COATED ORAL at 09:56

## 2020-03-25 RX ADMIN — SODIUM CHLORIDE, PRESERVATIVE FREE 10 ML: 5 INJECTION INTRAVENOUS at 09:52

## 2020-03-25 RX ADMIN — PYRIDOSTIGMINE BROMIDE 60 MG: 60 TABLET ORAL at 09:51

## 2020-03-25 RX ADMIN — MIDODRINE HYDROCHLORIDE 10 MG: 5 TABLET ORAL at 17:04

## 2020-03-25 RX ADMIN — MIDODRINE HYDROCHLORIDE 10 MG: 5 TABLET ORAL at 12:34

## 2020-03-25 RX ADMIN — ENOXAPARIN SODIUM 30 MG: 30 INJECTION SUBCUTANEOUS at 09:51

## 2020-03-25 RX ADMIN — MIDODRINE HYDROCHLORIDE 10 MG: 5 TABLET ORAL at 09:51

## 2020-03-25 ASSESSMENT — PAIN SCALES - GENERAL
PAINLEVEL_OUTOF10: 0

## 2020-03-25 NOTE — PLAN OF CARE
ARU Interdisciplinary Plan of Care (IPOC)  [facility] Dr. Jhonny Sanchez [facility], [address]  [phone]  Fax: [phone]        Jose R Garcia    : 1935  Acct #: [de-identified]  MRN: [id number]   PHYSICIAN:  Shmuel Quiroz MD  Primary Active Problems:   Active Hospital Problems    Diagnosis Date Noted    Debility [R53.81] 2020       Rehabilitation Diagnosis:     Debility [R53.81]       ADMIT DATE:3/25/2020   CARE PLAN     NURSING:  Rodo Banda while on this unit will: Bowel and Bladder   [x] Be continent of bowel and bladder      [x] Have an adequate number of bowel movements   [] Urinate with no urinary retention >300ml in bladder   [] Bladder Scan: (details)   [] Complete bladder protocol with teresa removal   [] Initiate Bladder Program to toilet every ___ hours   [] Initiate Bowel Program to toilet every ___hours   [] Bladder training    [] Bowel training  Pulmonary   [x] Maintain O2 SATs at 92% or greater  Pain Management   [x] Have pain managed while on ARU        [x] Be pain free by discharge    [x] Medication Management and Education  Maintenance of Skin Integrity/Wound Management   [x] Have no skin breakdown while on ARU   [] Have improved skin integrity via wound measurements   [] Have no signs/symptoms of infection via infection protection and monitoring at the          wound site  Fall Prevention   [x] Be free from injury during hospitalization via fall prevention measures     [x] Disease management and Education  Precautions   [] Weight Bearing Precautions   [] Swallowing Precautions   [] Monitoring of Risks of Complications   [x] DVT Prophylaxis    [x] Fluid/electrolyte/Nutrition Management    [x] Complete education with patient/family with understanding demonstrated for          in-room safety with transfers to bed, toilet, wheelchair, shower as well as                bathroom activities and hygiene.   [] Adjustment [] Other:   Nursing interventions may include bowel/bladder training, education for medical assistive devices, medication education, O2 saturation management, energy conservation, stress management techniques, fall prevention, alarms protocol, seating and positioning, skin/wound care, pressure relief instruction,dressing changes,  infection protection, DVT prophylaxis, and/or assistance with in room safety with transfers to bed, toilet, wheelchair, shower as well as bathroom activities and hygiene. Patient/caregiver education for:   [] Disease/sustained injury/management      [x] Medication Use   [] Surgical intervention   [x] Safety/Precautions   [x] Body mechanics and or joint protection   [x] Health maintenance         PHYSICAL THERAPY:  Goals:                  Short term goals  Time Frame for Short term goals: 5-7 tx days or until discharge:   Short term goal 1: Pt will consistently complete all aspects of bed mobility and sup<->sit Ind. Short term goal 2: Pt will complete OOB transfers Mod Ind-Ind. Short term goal 3: Pt will ambulate >/=200 ft using SPC Mod Ind. Short term goal 4: Pt will ascend/descend 1 flight of stairs using 1 rail Mod Ind. Short term goal 5: Pt will ascend/descend curb step and ambulate over uneven surfaces using SPC Mod Ind. Short term goal 6: Pt will complete object retrieval from the floor with 1 UE support in standing Mod Ind. These goals were reviewed with this patient at the time of assessment and [facility] is in agreement. Plan of Care: Pt to be seen 5 days per week for a minimum of 60 minutes for 5-7 days.                 Current Treatment Recommendations: Functional Mobility Training, Home Exercise Program, Equipment Evaluation, Education, & procurement, Transfer Training, Gait Training, Safety Education & Training, Balance Training, Endurance Training, Stair training, Patient/Caregiver Education & Training [facility] physical activity. We must provide DVT prophylaxis and pulmonary hygiene as well as nutritional support. 2. DVT prophylaxis: Lovenox 30 mg subcu daily. I must monitor his hemoglobin and platelet count periodically while on this medication. Weightbearing activities will be pursued daily. GI prophylaxis provided. 3. Ischemic cardiomyopathy: Patient requires afterload reduction with Imdur. We will monitor his weights daily but is not on diuretics at this time. Peripheral neuropathy and remote history of diabetes: We must incorporate adaptive equipment training and strengthening exercises to help him correct for his diabetic neuropathy while addressing his orthostasis. Anticipated discharge destination:    [] Home Independently   [x] Home with supervision    []SNF     [] Other       This plan has been reviewed with me in a language I understand.  I have had the opportunity to include my input with my therapy team.    ________________________________________________   ______________________  Patient/Significant Other      Date    I have reviewed this initial plan of care and agree with its contents:    Title   Name    Date    Time    Physician: Donya Cortez 3/28/2020 8:36 AM    Case Mgmt:  Noelia Meckel 3/27/2020 1329    OT: Angel Mercedes 88, OTR/L 3/26/2020 15:30    PT: Maximino Snow PT     03/26/2020    14:07    RN: Vargas Savage RN 3/25/2020 7359    ST:    Dietician: Rosemarie Connor RD, GENARO  03/26/2020  13:23

## 2020-03-25 NOTE — H&P
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 No aprical clott.  H/O percutaneous left heart catheterization 2019    Severe multivessel disease. [facility] OTHER MEDICAL     Primary Care Physician Is Dr. Bert Walters    Hypertension     Malignant neoplasm of kidney [facility]) Right    MI (myocardial infarction) ([facility] [address] 2019    NSTEMI    Nerve damage     \"Both Feet\"    Renal mass     right    UTI (urinary tract infection)     Last One 2-13    Wears glasses     \"Just To Read\"       Past Surgical  History:    has a past surgical history that includes Colon surgery; Vasectomy; Toe amputation; Foot surgery (Right, ); Toe amputation (Left, ); colectomy (12); Colonoscopy (); eye surgery (Bilateral, ); Vasectomy (); and other surgical history (Right, 2013). Social History:    FAM HX: Reviewed and noncontributory    Soc HX:   Social History     Socioeconomic History    Marital status:       Spouse name: Not on file    Number of children: Not on file    Years of education: Not on file    Highest education level: Not on file   Occupational History    Not on file   Social Needs    Financial resource strain: Not on file    Food insecurity     Worry: Not on file     Inability: Not on file    Transportation needs     Medical: Not on file     Non-medical: Not on file   Tobacco Use    Smoking status: Former Smoker     Packs/day: 1.50     Years: 35.00     Pack years: 52.50     Last attempt to quit: 1977     Years since quittin.6    Smokeless tobacco: Never Used   Substance and Sexual Activity    Alcohol use: Yes     Comment: once in [place] Drug use: No    Sexual activity: Not Currently   Lifestyle    Physical activity     Days per week: Not on file     Minutes per session: Not on file    Stress: Not on file   Relationships    Social connections     Talks on phone: (with meals)  Patient taking differently: Take 10 mg by mouth 3 times daily (with meals)  11/22/19   John Warner MD   atorvastatin (LIPITOR) 40 MG tablet Take 1 tablet by mouth nightly 3/5/19   Maria Elena Fernandez MD         Medications:   Medications:    Infusions:   PRN Meds:     Data:     Laboratory this visit:  Reviewed  Recent Labs     03/24/20  1811   WBC 7.1   HGB 7.5*   HCT 25.1*         Recent Labs     03/24/20  1811      K 4.7      CO2 22   BUN 32*   CREATININE 1.5*     Recent Labs     03/24/20  1811   AST 20   ALT 12   BILITOT 0.1   ALKPHOS 88     No results for input(s): INR in the last 72 hours. Radiology this visit:  Reviewed. Ct Head Wo Contrast    Result Date: 3/24/2020  EXAMINATION: CT OF THE HEAD WITHOUT CONTRAST  3/24/2020 5:42 pm TECHNIQUE: CT of the head was performed without the administration of intravenous contrast. Dose modulation, iterative reconstruction, and/or weight based adjustment of the mA/kV was utilized to reduce the radiation dose to as low as reasonably achievable. COMPARISON: 11/14/2019 HISTORY: ORDERING SYSTEM PROVIDED HISTORY: fall, hit head TECHNOLOGIST PROVIDED HISTORY: Reason for exam:->fall, hit head Has a \"code stroke\" or \"stroke alert\" been called? ->No Reason for Exam: fall,hit head Acuity: Acute Type of Exam: Initial Mechanism of Injury: fatigue,hypotension,hx of falls and hx of dementia; fell at home FINDINGS: BRAIN/VENTRICLES: There is no evidence of intracranial hemorrhage or cortical based infarct. No mass effect or midline shift. No abnormal extra-axial fluid collections. Periventricular and deep white matter hypodensities are indeterminate although suggest chronic small vessel ischemic disease. ORBITS: The visualized portion of the orbits demonstrate no acute abnormality. SINUSES: The visualized paranasal sinuses and mastoid air cells demonstrate no acute abnormality.  SOFT TISSUES/SKULL:  No acute abnormality of the visualized

## 2020-03-25 NOTE — CARE COORDINATION
LSW reviewed chart. Pt has dementia. LSW called Pt POA/dgt Yeimi [phone]. Pt is from home with 2 sons who help in his care. Pt has been to [facility] in the past. Pt has had HC in the past. When Pt starts to feel better he discontinues the [facility].. Pt has a walker and cane that he does not use. Pt sons and dgt cooks for the Pt. Pt has PCP. Pt has med/Rx insurance. LSW went over the medicare guidelines for SNF with Yeimi and that at this time the Pt is not meeting in pt status. If Pt was to go to SNF Pt would have to private pay as long as Pt remained in obs status. LSW also talked to THE [facility] about ARU. Pt has been to ARU in the past. [facility] sent asking for therapy to wilfrido Dye.      Electronically signed by GIANNI Benitez on 3/25/2020 at 11:25 AM

## 2020-03-25 NOTE — PROGRESS NOTES
after first functional mobility ~5 feet, returned to chair, second trial ~20 feet pt became dizzy   · Toilet/Shower Transfers: DNT             AM-PAC Daily Activity Inpatient   How much help for putting on and taking off regular lower body clothing?: A Little  How much help for Bathing?: A Little  How much help for Toileting?: A Little  How much help for putting on and taking off regular upper body clothing?: A Little  How much help for taking care of personal grooming?: A Little  How much help for eating meals?: None  AM-[facility] Inpatient Daily Activity Raw Score: 19  AM-PAC Inpatient ADL T-Scale Score : 40.22  ADL Inpatient CMS 0-100% Score: 42.8  ADL Inpatient CMS G-Code Modifier : CK    Treatment:  Self Care Training:   Cues were given for safety, sequence, UE/LE placement, visual cues, and balance. Activities performed today included grooming, LB dressing    Therapeutic Activity Training:   Therapeutic activity training was instructed today. Cues were given for safety, sequence, UE/LE placement, awareness, and balance. Activities performed today included bed mobility training, sup-sit, sit-stand, functional mobility x 2 trials, stand to sit      Safety: patient left in chair with chair alarm, call light within reach, RN notified, gait belt used. Assessment:  Pt is a 82 yo male admitted from home for generalized weakness. Pt at baseline is Independent for ADLs Independent for high level IADLs and Independent for functional transfers/mobility w/ AD PRN. Pt currently presents w/ deficits in ADL and high level IADL independence, functional activity tolerance, dynamic sitting and standing balance and tolerance and functional transfers, cognition, and BUE strength. Pt would benefit from continued acute care OT services w/ discharge to ARU  Complexity: Moderate  Prognosis: Good, no significant barriers to participation at this time.    Plan  Times per week: 2x+  Times per day: Daily  Current Treatment Recommendations: Strengthening, Endurance Training, Patient/Caregiver Education & Training, Cognitive Reorientation, Equipment Evaluation, Education, & procurement, Balance Training, Pain Management, Self-Care / ADL, Functional Mobility Training, Safety Education & Training, Home Management Training, Cognitive/Perceptual Training     Equipment: defer    Goals:  Pt goal: go home  Time Frame for STGs: discharge  Goal 1: Pt will perform UE ADLs Independent  Goal 2: Pt will perform LE ADLs Independent  Goal 3: Pt will perform toileting Independent  Goal 4: Pt will perform functional transfer w/ AD Ellie  Goal 5: Pt will perform functional mobility w/ AD Ellie  Goal 6: Pt will perform therex/theract in order to increase functional activity tolerance and dynamic standing balance    Treatment plan:  Pt will perform functional task in stand reaching in all 3 planes in order to increase dynamic standing balance and functional activity tolerance    Recommendations for NURSING activity: Up to chair for all 3 meals and up to standard commode for all toileting needs    Time:   Time in: 1154  Time out: 1220  Timed treatment minutes: 11 minutes  Total time: 26 minutes    Electronically signed by:    Palomo DRUMMOND/KRISTAL [id number]  12:27 YH,7/34/0877

## 2020-03-25 NOTE — DISCHARGE SUMMARY
Aruna Banda 1935 [id number]  PCP:  Bert Walters MD    Admit date: 3/24/2020  Admitting Physician: Panchito Campbell MD    Discharge date: 3/25/2020 Discharge Physician: Serene Reyes MD         Hospital Course and Discharge Diagnoses Include:    Generalized weakness resulting in frequent falls, Dizziness  - improved, likely from orthostatic hypotension as laying down is 120/58, standing is 100/51, continue midodrine and mestinon  - check carotid duplex: ESTEBAN 1-32%, LICA 74-93% b/l  - echo 11/19: ef 30-35%, severe MR  - pt/ot, recommended ARU  - fall precautions  - CT head: non acute  - IVF done     Hypotension  -stable  - holding antihypertensives, could be leading to falls, once BP improves recommend to resume home metoprolol and lisinopril and lasix.   - continue midodrine       ANABELLE  - resolved with IVF     Chronic Anemia  - stable, no gross blood loss noted at this time  - check iron panel pending  - check FOBTpending        Ischemic cardiomyopathy/ Chronic combined systolic and diastolic heart failure. Last TTE 11/2019 EF 30-35% severe MR. [facility] 3/2019-  Multivessel disease-medical management high risk for CABG         Physical Exam on Discharge date: 03/25/20  Gen:  awake, alert, cooperative, no apparent distress  Head/Eyes:  Normocephalic atraumatic, EOMI   NECK:   symmetrical, trachea midline  LUNGS: Normal Effort   CARDIOVASCULAR:  Normal rate  ABDOMEN: Non tender, non distended, no HSM noted. MUSCULOSKELETAL:  ROM limited  NEUROLOGIC: Alert and Oriented,  Cranial nerves II-XII are grossly intact.    SKIN:  no bruising or bleeding, normal skin color,  no redness    Procedures:  See above  Ct Head Wo Contrast    Result Date: 3/24/2020  EXAMINATION: CT OF THE HEAD WITHOUT CONTRAST  3/24/2020 5:42 pm TECHNIQUE: CT of the head was performed without the administration of intravenous contrast. Dose modulation, iterative reconstruction, and/or weight based adjustment of the mA/kV was utilized to reduce the 1 tablet by mouth nightly     enalapril 5 MG tablet  Commonly known as:  VASOTEC     ferrous sulfate 325 (65 Fe) MG tablet  Commonly known as:  IRON 325  Take 1 tablet by mouth 2 times daily (with meals)     folic acid 1 MG tablet  Commonly known as:  FOLVITE  Take 1 tablet by mouth daily     furosemide 40 MG tablet  Commonly known as:  LASIX  Take 1 tablet by mouth daily     isosorbide mononitrate 30 MG extended release tablet  Commonly known as:  IMDUR     metoprolol succinate 25 MG extended release tablet  Commonly known as: Toprol XL  Take 1 tablet by mouth daily     midodrine 5 MG tablet  Commonly known as:  PROAMATINE  Take 1 tablet by mouth 3 times daily (with meals)     pyridostigmine 60 MG tablet  Commonly known as:  MESTINON               Code Status: Full Code     Consults:   IP CONSULT TO HOSPITALIST    Diet: cardiac diet    Activity: activity as tolerated   Work:    Discharged Condition: good    Prognosis: Fair - Good    Disposition: ARU      Follow-up with   1. PCP within   5-7    Days    Follow up labs: none       Discharge Physician Signed: Og Alex M.D. The patient was seen and examined on day of discharge and this discharge summary is in conjunction with any daily progress note from day of discharge.   Time spent on discharge in the examination, evaluation, counseling and review of medications and discharge plan: 34 minutes

## 2020-03-26 PROCEDURE — 94761 N-INVAS EAR/PLS OXIMETRY MLT: CPT

## 2020-03-26 PROCEDURE — 97530 THERAPEUTIC ACTIVITIES: CPT

## 2020-03-26 PROCEDURE — 94150 VITAL CAPACITY TEST: CPT

## 2020-03-26 PROCEDURE — 97110 THERAPEUTIC EXERCISES: CPT

## 2020-03-26 PROCEDURE — 97116 GAIT TRAINING THERAPY: CPT

## 2020-03-26 PROCEDURE — 1280000000 HC REHAB R&B

## 2020-03-26 PROCEDURE — 94010 BREATHING CAPACITY TEST: CPT

## 2020-03-26 PROCEDURE — 97535 SELF CARE MNGMENT TRAINING: CPT

## 2020-03-26 PROCEDURE — 97162 PT EVAL MOD COMPLEX 30 MIN: CPT

## 2020-03-26 PROCEDURE — 6370000000 HC RX 637 (ALT 250 FOR IP): Performed by: FAMILY MEDICINE

## 2020-03-26 PROCEDURE — 6360000002 HC RX W HCPCS: Performed by: FAMILY MEDICINE

## 2020-03-26 PROCEDURE — 99232 SBSQ HOSP IP/OBS MODERATE 35: CPT | Performed by: PHYSICAL MEDICINE & REHABILITATION

## 2020-03-26 PROCEDURE — 97166 OT EVAL MOD COMPLEX 45 MIN: CPT

## 2020-03-26 RX ADMIN — ISOSORBIDE MONONITRATE 30 MG: 30 TABLET, EXTENDED RELEASE ORAL at 08:47

## 2020-03-26 RX ADMIN — PYRIDOSTIGMINE BROMIDE 60 MG: 60 TABLET ORAL at 08:47

## 2020-03-26 RX ADMIN — PYRIDOSTIGMINE BROMIDE 60 MG: 60 TABLET ORAL at 13:36

## 2020-03-26 RX ADMIN — ENOXAPARIN SODIUM 30 MG: 30 INJECTION SUBCUTANEOUS at 08:47

## 2020-03-26 RX ADMIN — PYRIDOSTIGMINE BROMIDE 60 MG: 60 TABLET ORAL at 20:44

## 2020-03-26 RX ADMIN — MIDODRINE HYDROCHLORIDE 10 MG: 5 TABLET ORAL at 16:45

## 2020-03-26 RX ADMIN — MIDODRINE HYDROCHLORIDE 10 MG: 5 TABLET ORAL at 08:47

## 2020-03-26 RX ADMIN — ATORVASTATIN CALCIUM 40 MG: 40 TABLET, FILM COATED ORAL at 20:44

## 2020-03-26 RX ADMIN — MIDODRINE HYDROCHLORIDE 10 MG: 5 TABLET ORAL at 11:52

## 2020-03-26 RX ADMIN — ASPIRIN 81 MG: 81 TABLET, COATED ORAL at 08:47

## 2020-03-26 ASSESSMENT — PAIN SCALES - GENERAL
PAINLEVEL_OUTOF10: 0
PAINLEVEL_OUTOF10: 0

## 2020-03-26 NOTE — PROGRESS NOTES
Occupational Therapy  Physical Rehabilitation: OCCUPATIONAL THERAPY     [x] daily progress note       [] discharge       Patient Name:  Jose R Garcia   :  1935 MRN: [id number]  Room:  [address] [place] [address] Date of Admission: 3/25/2020  Rehabilitation Diagnosis:   Debility [R53.81]       Date 3/26/2020       Day of ARU Week:  2   Time IN/OUT 2668-9568   Individual Tx Minutes 30   Group Tx Minutes 0   Co-Treat Minutes 0   Concurrent Tx Minutes 0   TOTAL Tx Time Mins 30   Variance Time 0   Variance Time []   Refusal due to:     []   Medical hold/reason:    []   Illness   []   Off Unit for test/procedure  []   Extra time needed to complete task  []   Therapeutic need  []   Other (specify):   Restrictions Restrictions/Precautions: Fall Risk, General Precautions(JOSE, monitor dizziness/orthostatic hypotension )         Communication with other providers: [x]   OK to see per nursing:     []   Spoke with team member regarding:      Subjective observations and cognitive status: Pt states \" I would like to go outside for a little bit\"   Pain level/location:    /10       Location:    Discharge recommendations  Anticipated discharge date:  TBD  Destination: []home alone   []home alone w assist prn   [] home w/ family    [] Continuous supervision       []SNF    [] Assisted living     [] Other:   Continued therapy: []HHC OT  []OUTPATIENT  OT   [] No Further OT  Equipment needs: TBD         Bed Mobility:           [x]   Pt received out of bed       Transfers:    Sit--> Stand:  SBA  Stand --> Sit:   SBA          Additional Therapeutic activities/exercises completed this date:     []   ADL Training   [x]   Balance/Postural training     [x]   Bed/Transfer Training   [x]   Endurance Training   []   Neuromuscular Re-ed   []   Nu-step:  Time:        Level:         #Steps:       []   Rebounder:    []  Seated     []  Standing        []   Supine Ther Ex (reps/sets):     [x]   Seated Ther Ex (reps/sets):   For BUE strengthening for ADL & functional mobility Indep pt performed BUE strengthening HEP c 2# hand weights x 10 reps x 6 exercises with Minimal difficulty. [x]   Standing Ther Ex (reps/sets): Pt stood x 10 min at rail with SBA while completing dynamic stand task while reaching outside base of support to facilitate increased balance for ADL tasks and transfers. []   Other:      Comments:      Patient/Caregiver Education and Training:   []   [employer] Equipment Use  [x]   Bed Mobility/Transfer Technique/Safety  [x]   Energy Conservation Tips  []   Family training  [x]   Postural Awareness  [x]   Safety During Functional Activities  []   Reinforced Patient's Precautions   []   Progress was updated and reviewed in Rehabtracker with patient and/or family this         date. Treatment Plan for Next Session: Continue with POC.           Treatment/Activity Tolerance:   [x] Tolerated treatment with no adverse effects    [x] Patient limited by fatigue  [] Patient limited by pain   [] Patient limited by medical complications:    [] Adverse reaction to Tx:   [] Significant change in status    Safety:       []  bed alarm set    []  chair alarm set    []  Pt refused alarms                [x]  Telesitter activated      [x]  Gait belt used during tx session      [x]other:   Left with nursing tech  Number of Minutes/Billable Intervention  Therapeutic Exercise 15   ADL Self-care 0   Neuro Re-Ed 0   Therapeutic Activity 015   Group 0   Other: 00   TOTAL 30       Social History  Social/Functional History  Lives With: Family(2 sons (both work but has a daughter who lives close by and helps prn))  Type of Home: House(tri-level )  Home Layout: Multi-level, Bed/Bath upstairs, 1/2 bath on main level(6 steps with R ascending rail to bedroom/full bath )  Home Access: Level entry  Bathroom Shower/Tub: Tub only  Bathroom Toilet: Standard  Home Equipment: Quad cane, Rolling walker  ADL Assistance: Independent  Homemaking Responsibilities: Yes  Meal Prep Responsibility: No  Laundry Responsibility: Primary  Cleaning Responsibility: No  Bill Paying/Finance Responsibility: Primary  Shopping Responsibility: Secondary  Dependent Care Responsibility: No  Health Care Management: Primary  Ambulation Assistance: Independent  Transfer Assistance: Independent  Active : No  Mode of Transportation: Car  Occupation: Retired  Additional Comments: sleeps in regular, flat bed; 2 falls in the past year with minor injuries; recent fall required this hospitalization     Objective                                                                                    Goals:  (Update in navigator)  Short term goals  Time Frame for Short term goals: STG=LTG:  Long term goals  Time Frame for Long term goals : 3-5 days or until D/C  Long term goal 1: Pt to complete grooming with Ind  Long term goal 2: Pt to complete bathing using AE/DME PRN with Mod(I)  Long term goal 3: Pt to complete UB dressing with Ind  Long term goal 4: Pt to complete LB dressing with Ind  Long term goal 5: Pt to complete toileting with Ind  Long term goals 6: Pt to complete all transfers (bed, toilet, shower) with Mod(I)  Long term goal 7: Pt to doff/don footwear with Ind  Long term goal 8: Pt to complete ongoing therex/act. w/emphasis on >5-8 min static/dynamic stand for ADL/IADL task completion:        Plan of Care                                                                              Times per week: 5 days per week for a minimum of 60 minutes/day plus group as appropriate for 60 minutes.   Treatment to include Plan  Times per day: Daily  Current Treatment Recommendations: Strengthening, Endurance Training, Patient/Caregiver Education & Training, Cognitive Reorientation, Equipment Evaluation, Education, & procurement, Balance Training, Pain Management, Self-Care / ADL, Functional Mobility Training, Safety Education & Training, Home Management Training, Cognitive/Perceptual Training, Positioning    Electronically signed by   ROC Rene 1992    3/26/2020, 4:04 PM

## 2020-03-26 NOTE — PROGRESS NOTES
Assistance: Independent  Homemaking Responsibilities: Yes  Meal Prep Responsibility: No  Laundry Responsibility: Primary  Cleaning Responsibility: No  Bill Paying/Finance Responsibility: Primary  Shopping Responsibility: Secondary  Dependent Care Responsibility: No  Health Care Management: Primary  Ambulation Assistance: Independent  Transfer Assistance: Independent  Active : No  Mode of Transportation: Car  Occupation: Retired  Additional Comments: sleeps in regular, flat bed; 2 falls in the past year with minor injuries; recent fall required this hospitalization     Restrictions:  Restrictions/Precautions  Restrictions/Precautions: Fall Risk, General Precautions(, monitor dizziness/orthostatic hypotension )        Position Activity Restriction  Other position/activity restrictions: saline lock LUE          Pain Level: 0       Objective:  Observation/Palpation  Posture: Good  Observation: Pt upright in recliner and agreeable to therapy session on this date. Orientation  Overall Orientation Status: Impaired  Orientation Level: Disoriented to situation        Vision  Vision: Impaired  Vision Exceptions: Wears glasses for reading  Vision - Basic Assessment  Visual History: No significant visual history  Patient Visual Report: No visual complaint reported. Visual Field Cut: No  Oculo Motor Control: WNL  Hearing  Hearing: Within functional limits    ROM:      LUE AROM (degrees)  LUE AROM : WFL           RUE AROM (degrees)  RUE AROM : WFL          Strength:    LUE Strength  L Hand General: 4/5  RUE Strength  R Hand General: 4/5    Quality of Movement:   Tone RUE  RUE Tone: Normotonic  Tone LUE  LUE Tone: Normotonic  Coordination  Movements Are Fluid And Coordinated: Yes       Sensation:    Sensation  Overall Sensation Status: Impaired(chronic neuropathy on bilat feet )    ADL's:    ADL  Feeding: Independent(pt reports no difficulty)  Grooming: Supervision(Pt completes in standing w/slight unsteadiness on Independent  Oral Hygiene  Assistance Needed: Supervision or touching assistance  CARE Score: 4  Discharge Goal: Independent  Toileting Hygiene  Discharge Goal: Independent  Shower/Bathe Self  Assistance Needed: Supervision or touching assistance  CARE Score: 4  Discharge Goal: Independent  Upper Body Dressing  Assistance Needed: Supervision or touching assistance  CARE Score: 4  Discharge Goal: Independent  Lower Body Dressing  Assistance Needed: Supervision or touching assistance  CARE Score: 4  Discharge Goal: Independent  Putting On/Taking Off Footwear  Assistance Needed: Supervision or touching assistance  CARE Score: 4  Discharge Goal: Independent    Assessment:     Performance deficits / Impairments: Decreased functional mobility , Decreased cognition, Decreased high-level IADLs, Decreased ADL status, Decreased endurance, Decreased ROM, Decreased strength, Decreased posture, Decreased balance, Decreased safe awareness    The patient is a 81year old M admitted onto ARU after hospitalization for fall from home and Hx. Of dementia. CT of the head was taken and impression states: No acute intracranial abnormality. No other pertinent imaging was done. Pertinent PMH includes CHF, DM and HTN. PTA, Pt reports he was Ind. In all ADL's completing IADL's w/o any AD within the home. Pt was primarily limited by unsteadiness in standing, specifically when BUE unsupported such as LB dressing and toileting requiring steadying assist at times for stability. Pt was also limited by cognition requiring redirection at times as well as education on ARU stay and benefits. I suspect that within 5-7 days this patient will return to an Ind level and will most likely not require further [address] [place] [address] services. Prognosis: Good  Decision Making: Medium Complexity  Clinical Presentation:  Dec. Balance, strength and endurance  History:  See \"Social/Functional\" sections for complete Occupational Profile.   Pt's co-morbidities include CHF, HTN and DM which all impact function and ability to progress through plan of care. Assistance/Modification:  Use of DME, providing verbal cues, providing physical assistance for mobility and ADL's, providing set-up of supplies during ADL's  Patient education:   ARU procotol, Role of O.T., O.T. plan of care  []   Patient goal was established and reviewed in Rehabtracker with patient and/or family this date. Treatment Initiated:  Patient education, ADL re-training, therex/act.    Barriers to Improvement:  Dementia, compliance with ARU program, confusion/cognitive limitations   REQUIRES OT FOLLOW UP: Yes  Discharge Recommendations:  Home w/assist PRN vs HHOT  Equipment Recommendations:  TBD; likely none    Goals:  Patient Goals   Patient goals : Pt would like to go home  Short term goals  Time Frame for Short term goals: STG=LTG  Long term goals  Time Frame for Long term goals : 3-5 days or until D/C  Long term goal 1: Pt to complete grooming with Ind  Long term goal 2: Pt to complete bathing using AE/DME PRN with Mod(I)  Long term goal 3: Pt to complete UB dressing with Ind  Long term goal 4: Pt to complete LB dressing with Ind  Long term goal 5: Pt to complete toileting with Ind  Long term goals 6: Pt to complete all transfers (bed, toilet, shower) with Mod(I)  Long term goal 7: Pt to doff/don footwear with Ind  Long term goal 8: Pt to complete ongoing therex/act. w/emphasis on >5-8 min static/dynamic stand for ADL/IADL task completion    Plan:    Pt will be seen at least 60 minutes per day for a minimum of 5 days per week, plus group therapy as appropriate  Current Treatment Recommendations: Strengthening, Endurance Training, Patient/Caregiver Education & Training, Cognitive Reorientation, Equipment Evaluation, Education, & procurement, Balance Training, Pain Management, Self-Care / ADL, Functional Mobility Training, Safety Education & Training, Home Management Training, Cognitive/Perceptual Training,

## 2020-03-26 NOTE — PROGRESS NOTES
Physical Therapy    [facility] ARU PHYSICAL THERAPY EVALUATION    Chart Review:  Past Medical History:   Diagnosis Date    CHF (congestive heart failure) ([facility])     Grade II diastolic dysfunction    Colon Cancer 7-24-12    \"Took 1.5 Foot Large Colon\"    Diabetes mellitus ([facility] Dx 1998    Gangrene ([facility] [facility] 1998    \"Gangrene Right Foot, Amputated Right Little Toe And Little Bit Of Right Foot\"    H/O Limited echocardiogram 05/17/2019    EF 30-35%. No aprical clott.  H/O percutaneous left heart catheterization 03/02/2019    Severe multivessel disease.     HX OTHER MEDICAL     Primary Care Physician Is Dr. Deng Maria    Hypertension     Malignant neoplasm of kidney [facility]) Right    MI (myocardial infarction) ([facility] [facility] 03/02/2019    NSTEMI    Nerve damage     \"Both Feet\"    Renal mass 2012    right    UTI (urinary tract infection)     Last One 2-13    Wears glasses     \"Just To Read\"     Past Surgical History:   Procedure Laterality Date    COLECTOMY  7-24-12    \"Took 1.5 Foot Large Colon\", Colon Cancer    COLON SURGERY      april 2012    COLONOSCOPY  2012    EYE SURGERY Bilateral 2000's    Cataracts With Lens Implants Eyes    FOOT SURGERY Right 1998    \"Gangrene Right Foot, Amputated Right Little Toe, Little Bit Right Foot\"    OTHER SURGICAL HISTORY Right 03 18 2013    right robotic assist radical nephrectomy    TOE AMPUTATION      little toes , bilaterally     TOE AMPUTATION Left 2002    \"Little Toe\"   [facility]     Fall History: multiple without significant injuries, however recent falls required this hospitalization   Social History:  Social/Functional History  Lives With: Family(2 sons (both work but has a daughter who lives close by and helps prn))  Type of Home: House(tri-level )  Home Layout: Multi-level, Bed/Bath upstairs, 1/2 bath on main level(6 steps with R ascending rail to bedroom/full bath )  Home Access: Level entry  Bathroom Shower/Tub: Tub only  Bathroom Toilet: Training    PT Individual Minutes  Time In: 9664  Time Out: 8394  Minutes: 80        Timed Code Treatment Minutes: 65 Minutes    Number of Minutes/Billable Intervention  PT Evaluation 15   Gait Training 35   Therapeutic Exercise    Neuro Re-Ed    Therapeutic Activity 30   Wheelchair Propulsion    Group    Other:    TOTAL 80       Electronically signed by:    Trena Gregg PT    3/26/2020, 1:51 PM

## 2020-03-27 PROBLEM — D63.8 ANEMIA OF CHRONIC ILLNESS: Status: ACTIVE | Noted: 2019-11-14

## 2020-03-27 PROCEDURE — 6360000002 HC RX W HCPCS: Performed by: FAMILY MEDICINE

## 2020-03-27 PROCEDURE — 94761 N-INVAS EAR/PLS OXIMETRY MLT: CPT

## 2020-03-27 PROCEDURE — 6370000000 HC RX 637 (ALT 250 FOR IP): Performed by: FAMILY MEDICINE

## 2020-03-27 PROCEDURE — G0328 FECAL BLOOD SCRN IMMUNOASSAY: HCPCS

## 2020-03-27 PROCEDURE — 97535 SELF CARE MNGMENT TRAINING: CPT

## 2020-03-27 PROCEDURE — 97530 THERAPEUTIC ACTIVITIES: CPT

## 2020-03-27 PROCEDURE — 1280000000 HC REHAB R&B

## 2020-03-27 PROCEDURE — 97116 GAIT TRAINING THERAPY: CPT

## 2020-03-27 PROCEDURE — 99232 SBSQ HOSP IP/OBS MODERATE 35: CPT | Performed by: PHYSICAL MEDICINE & REHABILITATION

## 2020-03-27 PROCEDURE — 97110 THERAPEUTIC EXERCISES: CPT

## 2020-03-27 RX ADMIN — ENOXAPARIN SODIUM 30 MG: 30 INJECTION SUBCUTANEOUS at 08:52

## 2020-03-27 RX ADMIN — ISOSORBIDE MONONITRATE 30 MG: 30 TABLET, EXTENDED RELEASE ORAL at 08:52

## 2020-03-27 RX ADMIN — PYRIDOSTIGMINE BROMIDE 60 MG: 60 TABLET ORAL at 15:07

## 2020-03-27 RX ADMIN — PYRIDOSTIGMINE BROMIDE 60 MG: 60 TABLET ORAL at 08:52

## 2020-03-27 RX ADMIN — ASPIRIN 81 MG: 81 TABLET, COATED ORAL at 08:52

## 2020-03-27 RX ADMIN — MIDODRINE HYDROCHLORIDE 10 MG: 5 TABLET ORAL at 11:43

## 2020-03-27 RX ADMIN — PYRIDOSTIGMINE BROMIDE 60 MG: 60 TABLET ORAL at 20:02

## 2020-03-27 RX ADMIN — MIDODRINE HYDROCHLORIDE 10 MG: 5 TABLET ORAL at 08:52

## 2020-03-27 RX ADMIN — MIDODRINE HYDROCHLORIDE 10 MG: 5 TABLET ORAL at 17:29

## 2020-03-27 RX ADMIN — ATORVASTATIN CALCIUM 40 MG: 40 TABLET, FILM COATED ORAL at 20:02

## 2020-03-27 ASSESSMENT — PAIN SCALES - GENERAL
PAINLEVEL_OUTOF10: 0
PAINLEVEL_OUTOF10: 0

## 2020-03-27 NOTE — PROGRESS NOTES
21 03/25/2020    ALKPHOS 96 03/25/2020    BILITOT 0.2 03/25/2020       Expected length of stay  prior to a supervised level of function for discharge home with a walker and [facility] OT/PT is 2 weeks. Recommendations:    1. Recurrent falls with orthostatic hypotension: Developing the routine for his daily occupational and physical therapy. He requires ProAmatine and Mestinon to support his blood pressure. Encouraging consistent oral intake, gradual changes in position and graduated increases in physical activity. We must provide DVT prophylaxis and pulmonary hygiene as well as nutritional support. Some impulsivity noted in his movement and speech. 2. DVT prophylaxis: Lovenox 30 mg subcu daily. I must monitor his hemoglobin and platelet count periodically while on this medication. Weightbearing activities are pursued daily. GI prophylaxis provided. No signs of acute blood loss. 3. Ischemic cardiomyopathy: Patient requires afterload reduction with Imdur. Monitoring his weights daily but he is not on diuretics at this time. 4. Peripheral neuropathy and remote history of diabetes: We must incorporate adaptive equipment training and strengthening exercises to help him correct for his diabetic neuropathy while addressing his orthostasis.

## 2020-03-27 NOTE — PROGRESS NOTES
Physical Therapy  [x] daily progress note       [] discharge       Patient Name:  Kelly Robison   :  1935 MRN: [id number]  Room:  [address] [place] [address] Date of Admission: 3/25/2020  Rehabilitation Diagnosis:   Debility [R53.81]       Date 3/27/2020       Day of ARU Week:  3   Time IN/-1030   Individual Tx Minutes 60   Group Tx Minutes    Co-Treat Minutes    Concurrent Tx Minutes    TOTAL Tx Time Mins 60   Variance Time    Variance Time []   Refusal due to:     []   Medical hold/reason:    []   Illness   []   Off Unit for test/procedure  []   Extra time needed to complete task  []   Therapeutic need  []   Other (specify):   Restrictions Restrictions/Precautions  Restrictions/Precautions: Fall Risk, General Precautions(JOSE, monitor dizziness/orthostatic hypotension )  Position Activity Restriction  Other position/activity restrictions: saline lock LUE    Communication with other providers: [x]   OK to see per nursing:     []   Spoke with team member regarding:      Subjective observations and cognitive status: Pt sitting on EOB eating breakfast and excited for therapist to be there.  He was very talkative throughout the entire session     Pain level/location:    0/10       Location:    Discharge recommendations  Anticipated discharge date:  TBD  Destination: []home alone   []home alone with assist PRN     [] home w/ family      [] Continuous supervision  []SNF    [] Assisted living     [] Other:   Continued therapy: []HHC PT  []OUTPATIENT  PT   [] No Further PT  Equipment needs: TBD     Bed Mobility:           []   Pt received out of bed   Rolling R/L:  Not observed   Scooting:  Supervision  Lying --> Sit:  Not observed  Sit --> lying:  Not observed    Transfers:    Sit--> Stand:  CGA majority of attempts but did require min A 1x to come into standing from w/c  Stand --> Sit:   CGA with cues for improved control and safety  Chair-->Bed/Bed --> Chair:   CGA - SBA  Car Transfers:  Not attempted  Toilet Transfer (if applicable): Not observed  Other:    Assistive device required for transfer:   RW    Gait:    Distance:  97', 226' (max potential), 145'   Assistance:  CGA - SBA  Device:  RW  Gait Quality:  Slow overall pace but good overall safety with use or RW    Wheelchair Propulsion:  Distance:     Assistance:    Extremities Used: Not observed    Type:    [x]  Manual        []  Electric    Stairs   # Completed:  8 steps; 6\"  Assistance:  Min A - CGA  Supportive Device:  Bilateral hand rails    Uneven Surfaces:       Assistance:      Device:      Surfaces Completed:   []  Green mat with bean bags beneath      []  Throw rugs       []  Ramp       []  Outdoor pavements        []  Grass             []  Loose gravel        []  Other:         Additional Therapeutic activities/exercises completed this date:     [x]   Nu-step:  Time: 5 minutes        Level: 3        #Steps:       []   Rebounder:    []  Seated     []  Standing        [x]   Balance training: Dynamic sitting balance with donning and doffing shirt with SBA with use of hand on bed rail intermittently for balance         []   Postural training    []   Supine ther ex (reps/sets):     []   Seated ther ex (reps/sets):     []   Standing ther ex (reps/sets):     [x]   Picking up object from floor (standing):  1 hand on RW with CGA                 []   Reacher used   []   Other:   []   Other:    Comments:      Patient/Caregiver Education and Training:   [x]   Bed Mobility/Transfer technique/safety  [x]   Gait technique/sequencing  [x]   Proper use of assistive device  []   Advanced mobility safety and technique  [x]   Reinforced patient's precautions/mobility while maintaining precautions  []   Postural awareness  []   Family training  [x]   Progress was updated and reviewed in Rehabtracker with patient and/or family this date.     Treatment Plan for Next Session: Continue to progress gait with uneven surfaces and obstacles, dynamic balance and stair training      Assessment: Pt

## 2020-03-27 NOTE — PROGRESS NOTES
Treatment Recommendations: Strengthening, Endurance Training, Patient/Caregiver Education & Training, Cognitive Reorientation, Equipment Evaluation, Education, & procurement, Balance Training, Pain Management, Self-Care / ADL, Functional Mobility Training, Safety Education & Training, Home Management Training, Cognitive/Perceptual Training, Positioning    Electronically signed by   Marielena Kim MS, OTR/L  3/27/2020, 10:41 AM

## 2020-03-28 LAB — HEMOCCULT SP1 STL QL: POSITIVE

## 2020-03-28 PROCEDURE — 97112 NEUROMUSCULAR REEDUCATION: CPT

## 2020-03-28 PROCEDURE — 94761 N-INVAS EAR/PLS OXIMETRY MLT: CPT

## 2020-03-28 PROCEDURE — 97530 THERAPEUTIC ACTIVITIES: CPT

## 2020-03-28 PROCEDURE — 97535 SELF CARE MNGMENT TRAINING: CPT

## 2020-03-28 PROCEDURE — 94150 VITAL CAPACITY TEST: CPT

## 2020-03-28 PROCEDURE — 97110 THERAPEUTIC EXERCISES: CPT

## 2020-03-28 PROCEDURE — 6360000002 HC RX W HCPCS: Performed by: FAMILY MEDICINE

## 2020-03-28 PROCEDURE — 6370000000 HC RX 637 (ALT 250 FOR IP): Performed by: FAMILY MEDICINE

## 2020-03-28 PROCEDURE — 1280000000 HC REHAB R&B

## 2020-03-28 PROCEDURE — 97116 GAIT TRAINING THERAPY: CPT

## 2020-03-28 RX ADMIN — MIDODRINE HYDROCHLORIDE 10 MG: 5 TABLET ORAL at 09:15

## 2020-03-28 RX ADMIN — PYRIDOSTIGMINE BROMIDE 60 MG: 60 TABLET ORAL at 20:05

## 2020-03-28 RX ADMIN — PYRIDOSTIGMINE BROMIDE 60 MG: 60 TABLET ORAL at 15:12

## 2020-03-28 RX ADMIN — PYRIDOSTIGMINE BROMIDE 60 MG: 60 TABLET ORAL at 09:15

## 2020-03-28 RX ADMIN — ENOXAPARIN SODIUM 30 MG: 30 INJECTION SUBCUTANEOUS at 09:14

## 2020-03-28 RX ADMIN — ASPIRIN 81 MG: 81 TABLET, COATED ORAL at 09:15

## 2020-03-28 RX ADMIN — ISOSORBIDE MONONITRATE 30 MG: 30 TABLET, EXTENDED RELEASE ORAL at 09:15

## 2020-03-28 RX ADMIN — MIDODRINE HYDROCHLORIDE 10 MG: 5 TABLET ORAL at 17:21

## 2020-03-28 RX ADMIN — ATORVASTATIN CALCIUM 40 MG: 40 TABLET, FILM COATED ORAL at 20:05

## 2020-03-28 RX ADMIN — MIDODRINE HYDROCHLORIDE 10 MG: 5 TABLET ORAL at 12:39

## 2020-03-28 NOTE — PROGRESS NOTES
Occupational Therapy  . Physical Rehabilitation: OCCUPATIONAL THERAPY     [x] daily progress note       [] discharge       Patient Name:  Don Laguna   :  1935 MRN: [id number]  Room:  [address] [place], [place] [address] Date of Admission: 3/25/2020  Rehabilitation Diagnosis:   Debility [R53.81]       Date 3/28/2020       Day of ARU Week:  4   Time IN/OUT 1402 / 1502   Individual Tx Minutes    Group Tx Minutes    Co-Treat Minutes    Concurrent Tx Minutes    TOTAL Tx Time Mins 60   Variance Time N/A   Variance Time []   Refusal due to:     []   Medical hold/reason:    []   Illness   []   Off Unit for test/procedure  []   Extra time needed to complete task  []   Therapeutic need  []   Other (specify):   Restrictions Restrictions/Precautions: Fall Risk, General Precautions(JOSE, monitor dizziness/orthostatic hypotension )         Communication with other providers: [x]   OK to see per nursing:     []   Spoke with team member regarding:      Subjective observations and cognitive status: Pt received seated in bedside chair. \"My feet are always numb. ... numb and dumb. \" Pt actively participated in OT Tx session c min vc's for safe body positioning and w/o AD. Pt noted to intermittently reach out to wall rail and touch stable surfaces, exhibits unsteadiness w/o LOB c CGA.    Pain level/location:   0 /10       Location: Denies pain, reports numbness in B feet   Discharge recommendations  Anticipated discharge date:  TBD  Destination: []home alone   []home alone w assist prn   [] home w/ family    [] Continuous supervision       []SNF    [] Assisted living     [] Other:   Continued therapy: []C OT  []OUTPATIENT  OT   [] No Further OT  Equipment needs: TBD     Bed Mobility:           [x]   Pt received out of bed     Transfers:    Sit--> Stand:  CGA  Stand --> Sit:   CGA  Stand-Pivot:   CGA  Other:  Required min vc's for safe body positioning  Assistive device required for transfer:   No AD      Functional Mobility:    Assistance:  CGA w/o AD >100 ft x2 trials + min vc's for safe body positioning. Pt exhibits unsteadiness, intermittent reaching out for wall rail or steadying on stable surfaces. Device:   []   Rolling Walker     []   Standard Marene Loth []   Wheelchair        []   [employer]       []   4-Wheeled Marene Loth         []   Cardiac Marene Loth       []   Other:      Additional Therapeutic activities/exercises completed this date:     []   ADL Training   [x]   Balance/Postural training - With emphasis on dynamic balance, pt CGA to perform therapeutic toss / catch Rebounder activity using 2#, 3# and 4# balls x10 reps each x2 sets and rest breaks PRN. Pt educated for wide base of support and attention to B feet placement. Pt Sup seated c emphasis on dynamic sitting balance to perform functional reaching IADL for laundry folding task, sustained x15 minutes   [x]   Transfer Training   [x]   Endurance Training - multiple stands, CGA w/o AD 3-5 minutes each   []   Neuromuscular Re-ed   [x]   Nu-step:  Time:        Level:  Initially 4, transitioning to 3 c fatigue x18 minutes           []   Supine Ther Ex (reps/sets):     []   Seated Ther Ex (reps/sets):     []   Standing Ther Ex (reps/sets):     []   Other:      Comments: All therapeutic intervention performed c emphasis on dynamic balance / standing tolerance to inc strength, endurance and act tolerance for inc Indep c ADL tasks, func transfers / mobility. Patient/Caregiver Education and Training:   []   [employer] Equipment Use  [x]   Transfer Technique/Safety  []   Energy Conservation Tips  []   Family training  [x]   Postural Awareness  [x]   Safety During Functional Activities  []   Reinforced Patient's Precautions   []   Progress was updated and reviewed in Rehabtracker with patient and/or family this         date.     Treatment Plan for Next Session: Continue per OT POC      Assessment:        Treatment/Activity Tolerance:   [x] Tolerated treatment with no adverse effects    [x] Patient limited by

## 2020-03-28 NOTE — FLOWSHEET NOTE
only  Bathroom Toilet: Standard  Home Equipment: Quad cane, Rolling walker  ADL Assistance: Independent  Homemaking Responsibilities: Yes  Meal Prep Responsibility: No  Laundry Responsibility: Primary  Cleaning Responsibility: No  Bill Paying/Finance Responsibility: Primary  Shopping Responsibility: Secondary  Dependent Care Responsibility: No  Health Care Management: Primary  Ambulation Assistance: Independent  Transfer Assistance: Independent  Active : No  Mode of Transportation: Car  Occupation: Retired  Additional Comments: sleeps in regular, flat bed; 2 falls in the past year with minor injuries; recent fall required this hospitalization     Objective                                                                                    Goals:  (Update in navigator)  Short term goals  Time Frame for Short term goals: 5-7 tx days or until discharge:   Short term goal 1: Pt will consistently complete all aspects of bed mobility and sup<->sit Ind. Short term goal 2: Pt will complete OOB transfers Mod Ind-Ind. Short term goal 3: Pt will ambulate >/=200 ft using SPC Mod Ind. Short term goal 4: Pt will ascend/descend 1 flight of stairs using 1 rail Mod Ind. Short term goal 5: Pt will ascend/descend curb step and ambulate over uneven surfaces using SPC Mod Ind. Short term goal 6: Pt will complete object retrieval from the floor with 1 UE support in standing Mod Ind. :   :        Plan of Care                                                                              Times per week: 5 days per week for a minimum of 60 minutes/day plus group as appropriate for 60 minutes.   Treatment to include Current Treatment Recommendations: Functional Mobility Training, Home Exercise Program, Equipment Evaluation, Education, & procurement, Transfer Training, Gait Training, Safety Education & Training, Balance Training, Endurance Training, Stair training, Patient/Caregiver Education & Training    Electronically signed by

## 2020-03-28 NOTE — PROGRESS NOTES
Occupational Therapy  Physical Rehabilitation: OCCUPATIONAL THERAPY     [x] daily progress note       [] discharge       Patient Name:  Michelle Garland   :  1935 MRN: [id number]  Room:  [address] [place] [address] Date of Admission: 3/25/2020  Rehabilitation Diagnosis:   Debility [R53.81]       Date 3/28/2020       Day of ARU Week:  4   Time IN/OUT 4995-4256   Individual Tx Minutes 60   Group Tx Minutes 0   Co-Treat Minutes 0   Concurrent Tx Minutes 0   TOTAL Tx Time Mins 60   Variance Time 0   Variance Time []   Refusal due to:     []   Medical hold/reason:    []   Illness   []   Off Unit for test/procedure  []   Extra time needed to complete task  []   Therapeutic need  []   Other (specify):   Restrictions Restrictions/Precautions  Restrictions/Precautions: Fall Risk, General Precautions(JOSE, monitor dizziness/orthostatic hypotension )  Position Activity Restriction  Other position/activity restrictions: saline lock LUE    Communication with other providers: [x]   OK to see per nursing:     []   Spoke with team member regarding:      Subjective observations and cognitive status: Pt states \"I'm having a good day, at least I woke up\"   Pain level/location:   0 /10       Location:    Discharge recommendations  Anticipated discharge date:  TBD  Destination: []?home alone   []?home alone w assist prn []? home w/ family    [x]? Continuous supervision       []? SNF            []? Assisted living     []? Other:   Continued therapy: [x]? [facility] OT  []? OUTPATIENT  OT   []? No Further OT  Equipment needs: TBD; has SC, however, may benefit from TTB due to poor balance   (HIT F2 to transition between stars)      Grooming: Mod I to wash face  Oral Hygiene:  Declined  \"I have a lot of loose teeth and I might knock one out if I brush\"      Bathing:  SBA while standing to wash buttocks and david area.       Dressing:      Upper Body Dressing:  Supervision to don pull over shirt  Lower Body Dressing:  Supervision to don brief and 0   Other: 0   TOTAL 60       Social History  Social/Functional History  Lives With: Family(2 sons (both work but has a daughter who lives close by and helps prn))  Type of Home: House(tri-level )  Home Layout: Multi-level, Bed/Bath upstairs, 1/2 bath on main level(6 steps with R ascending rail to bedroom/full bath )  Home Access: Level entry  Bathroom Shower/Tub: Tub only  Bathroom Toilet: Standard  Home Equipment: Quad cane, Rolling walker  ADL Assistance: Independent  Homemaking Responsibilities: Yes  Meal Prep Responsibility: No  Laundry Responsibility: Primary  Cleaning Responsibility: No  Bill Paying/Finance Responsibility: Primary  Shopping Responsibility: Secondary  Dependent Care Responsibility: No  Health Care Management: Primary  Ambulation Assistance: Independent  Transfer Assistance: Independent  Active : No  Mode of Transportation: Car  Occupation: Retired  Additional Comments: sleeps in regular, flat bed; 2 falls in the past year with minor injuries; recent fall required this hospitalization     Objective                                                                                    Goals:  (Update in navigator)  Short term goals  Time Frame for Short term goals: STG=LTG:  Long term goals  Time Frame for Long term goals : 3-5 days or until D/C  Long term goal 1: Pt to complete grooming with Ind  Long term goal 2: Pt to complete bathing using AE/DME PRN with Mod(I)  Long term goal 3: Pt to complete UB dressing with Ind  Long term goal 4: Pt to complete LB dressing with Ind  Long term goal 5: Pt to complete toileting with Ind  Long term goals 6: Pt to complete all transfers (bed, toilet, shower) with Mod(I)  Long term goal 7: Pt to doff/don footwear with Ind  Long term goal 8: Pt to complete ongoing therex/act. w/emphasis on >5-8 min static/dynamic stand for ADL/IADL task completion:        Plan of Care                                                                              Times per week: 5 days per week for a minimum of 60 minutes/day plus group as appropriate for 60 minutes.   Treatment to include Plan  Times per day: Daily  Current Treatment Recommendations: Strengthening, Endurance Training, Patient/Caregiver Education & Training, Cognitive Reorientation, Equipment Evaluation, Education, & procurement, Balance Training, Pain Management, Self-Care / ADL, Functional Mobility Training, Safety Education & Training, Home Management Training, Cognitive/Perceptual Training, Positioning    Electronically signed by   ROC Herrera 1991    3/28/2020, 1:12 PM

## 2020-03-29 PROCEDURE — 6370000000 HC RX 637 (ALT 250 FOR IP): Performed by: FAMILY MEDICINE

## 2020-03-29 PROCEDURE — 6360000002 HC RX W HCPCS: Performed by: FAMILY MEDICINE

## 2020-03-29 PROCEDURE — 1280000000 HC REHAB R&B

## 2020-03-29 RX ADMIN — MIDODRINE HYDROCHLORIDE 10 MG: 5 TABLET ORAL at 08:50

## 2020-03-29 RX ADMIN — PYRIDOSTIGMINE BROMIDE 60 MG: 60 TABLET ORAL at 08:50

## 2020-03-29 RX ADMIN — ASPIRIN 81 MG: 81 TABLET, COATED ORAL at 08:50

## 2020-03-29 RX ADMIN — PYRIDOSTIGMINE BROMIDE 60 MG: 60 TABLET ORAL at 13:54

## 2020-03-29 RX ADMIN — ISOSORBIDE MONONITRATE 30 MG: 30 TABLET, EXTENDED RELEASE ORAL at 08:51

## 2020-03-29 RX ADMIN — PYRIDOSTIGMINE BROMIDE 60 MG: 60 TABLET ORAL at 20:22

## 2020-03-29 RX ADMIN — MIDODRINE HYDROCHLORIDE 10 MG: 5 TABLET ORAL at 17:16

## 2020-03-29 RX ADMIN — MIDODRINE HYDROCHLORIDE 10 MG: 5 TABLET ORAL at 12:27

## 2020-03-29 RX ADMIN — ENOXAPARIN SODIUM 30 MG: 30 INJECTION SUBCUTANEOUS at 08:51

## 2020-03-29 RX ADMIN — ATORVASTATIN CALCIUM 40 MG: 40 TABLET, FILM COATED ORAL at 20:22

## 2020-03-30 PROCEDURE — 6360000002 HC RX W HCPCS: Performed by: FAMILY MEDICINE

## 2020-03-30 PROCEDURE — 97530 THERAPEUTIC ACTIVITIES: CPT

## 2020-03-30 PROCEDURE — 1280000000 HC REHAB R&B

## 2020-03-30 PROCEDURE — 99232 SBSQ HOSP IP/OBS MODERATE 35: CPT | Performed by: PHYSICAL MEDICINE & REHABILITATION

## 2020-03-30 PROCEDURE — 6370000000 HC RX 637 (ALT 250 FOR IP): Performed by: FAMILY MEDICINE

## 2020-03-30 PROCEDURE — 97116 GAIT TRAINING THERAPY: CPT

## 2020-03-30 PROCEDURE — 97110 THERAPEUTIC EXERCISES: CPT

## 2020-03-30 PROCEDURE — 97535 SELF CARE MNGMENT TRAINING: CPT

## 2020-03-30 PROCEDURE — 94761 N-INVAS EAR/PLS OXIMETRY MLT: CPT

## 2020-03-30 RX ADMIN — MIDODRINE HYDROCHLORIDE 10 MG: 5 TABLET ORAL at 12:28

## 2020-03-30 RX ADMIN — MIDODRINE HYDROCHLORIDE 10 MG: 5 TABLET ORAL at 08:09

## 2020-03-30 RX ADMIN — PYRIDOSTIGMINE BROMIDE 60 MG: 60 TABLET ORAL at 20:31

## 2020-03-30 RX ADMIN — ENOXAPARIN SODIUM 30 MG: 30 INJECTION SUBCUTANEOUS at 08:09

## 2020-03-30 RX ADMIN — PYRIDOSTIGMINE BROMIDE 60 MG: 60 TABLET ORAL at 08:09

## 2020-03-30 RX ADMIN — PYRIDOSTIGMINE BROMIDE 60 MG: 60 TABLET ORAL at 15:17

## 2020-03-30 RX ADMIN — ASPIRIN 81 MG: 81 TABLET, COATED ORAL at 08:09

## 2020-03-30 RX ADMIN — ISOSORBIDE MONONITRATE 30 MG: 30 TABLET, EXTENDED RELEASE ORAL at 08:09

## 2020-03-30 RX ADMIN — MIDODRINE HYDROCHLORIDE 10 MG: 5 TABLET ORAL at 17:11

## 2020-03-30 RX ADMIN — ATORVASTATIN CALCIUM 40 MG: 40 TABLET, FILM COATED ORAL at 20:31

## 2020-03-30 ASSESSMENT — PAIN SCALES - GENERAL
PAINLEVEL_OUTOF10: 0
PAINLEVEL_OUTOF10: 0

## 2020-03-30 NOTE — PROGRESS NOTES
[x]   Seated Ther Ex (reps/sets): In order to promote improved UB strength in prep for transfers and ADL's, therapist engages Pt in 3x20 set/reps of protractions using 3lb DB, 2x20 set/reps  Of bicep curls using 3lb DB and 1x10 @ 5lbs. Additionally, therapist engages pt in 3x5 set/reps of shoulder press using 5lb DB.    []   Standing Ther Ex (reps/sets):     []   Other:      Comments:      Patient/Caregiver Education and Training:   []   Adaptive Equipment Use  []   Bed Mobility/Transfer Technique/Safety  []   Energy Conservation Tips  []   Family training  [x]   Postural Awareness  [x]   Safety During Functional Activities  []   Reinforced Patient's Precautions   []   Progress was updated and reviewed in Rehabtracker with patient and/or family this         date. Treatment Plan for Next Session: IADL kitchen task, TTB training     Assessment:  Pt tolerates session w/o any adverse events on this date.        Treatment/Activity Tolerance:   [x] Tolerated treatment with no adverse effects    [x] Patient limited by fatigue  [] Patient limited by pain   [] Patient limited by medical complications:    [] Adverse reaction to Tx:   [] Significant change in status    Safety:       []  bed alarm set    []  chair alarm set    []  Pt refused alarms                []  Telesitter activated      [x]  Gait belt used during tx session      []other:       Number of Minutes/Billable Intervention  Therapeutic Exercise 30   ADL Self-care 60   Neuro Re-Ed    Therapeutic Activity 30   Group    Other:    TOTAL 120       Social History  Social/Functional History  Lives With: Family(2 sons (both work but has a daughter who lives close by and helps prn))  Type of Home: House(tri-level )  Home Layout: Multi-level, Bed/Bath upstairs, 1/2 bath on main level(6 steps with R ascending rail to bedroom/full bath )  Home Access: Level entry  Bathroom Shower/Tub: Tub only  Bathroom Toilet: Standard  Home Equipment: Quad cane, Rolling walker  ADL Training, Cognitive/Perceptual Training, Positioning    Electronically signed by   Bharath Medina MS, OTR/L  3/30/2020, 1:23 PM

## 2020-03-30 NOTE — PLAN OF CARE
Problem: Falls - Risk of:  Goal: Will remain free from falls  Description: Will remain free from falls  Outcome: Ongoing  Goal: Absence of physical injury  Description: Absence of physical injury  Outcome: Ongoing     Problem: Infection:  Goal: Will remain free from infection  Description: Will remain free from infection  Outcome: Ongoing     Problem: Safety:  Goal: Free from accidental physical injury  Description: Free from accidental physical injury  Outcome: Ongoing  Goal: Free from intentional harm  Description: Free from intentional harm  Outcome: Ongoing     Problem: Daily Care:  Goal: Daily care needs are met  Description: Daily care needs are met  Outcome: Ongoing     Problem: Pain:  Goal: Patient's pain/discomfort is manageable  Description: Patient's pain/discomfort is manageable  Outcome: Ongoing     Problem: Skin Integrity:  Goal: Skin integrity will stabilize  Description: Skin integrity will stabilize  Outcome: Ongoing     Problem: Discharge Planning:  Goal: Patients continuum of care needs are met  Description: Patients continuum of care needs are met  Outcome: Ongoing

## 2020-03-30 NOTE — PROGRESS NOTES
Physical Therapy    [x] daily progress note       [] discharge       Patient Name:  Heavenly Preciado   :  1935 MRN: [id number]  Room:  [address] [place] [address] Date of Admission: 3/25/2020  Rehabilitation Diagnosis:   Debility [R53.81]       Date 3/30/2020       Day of ARU Week:  6   Time IN/OUT 1774-1442   Individual Tx Minutes 60   TOTAL Tx Time Mins 60   Variance Time    Variance Time []   Refusal due to:     []   Medical hold/reason:    []   Illness   []   Off Unit for test/procedure []   Extra time needed to complete task  []   Therapeutic need  []   Other (specify):   Restrictions Restrictions/Precautions  Restrictions/Precautions: Fall Risk, General Precautions(JOSE, monitor dizziness/orthostatic hypotension )  Position Activity Restriction  Other position/activity restrictions: saline lock LUE    Interdisciplinary communication []   Cleared for therapy per nursing     []   RN notified about issues during session  []   RN updated on pt performance  []   Spoke with   []   Spoke with OT  []   Spoke with MD  []   Other:    Subjective observations and cognitive status:  Pt up in chair in gym, just finished with OT session but feels \"pretty good\". Agreeable to session  BP in sitting after ax 103/53, HR 71; In standing after 1' 101/56   Pain level/location:  0 /10       Location:    Discharge recommendations  Anticipated discharge date:  TBD  Destination: []?home alone   []?home alone with assist PRN     []? home w/ family      []? Continuous supervision  []? SNF    []? Assisted living     []? Other:   Continued therapy: []?C PT  []? OUTPATIENT  PT   []?  No Further PT  Equipment needs: TBD     Bed Mobility:           []   Pt received out of bed     Transfers:    Sit--> Stand:  CG-SBA, min safety cues with hand placement  Stand --> Sit:   CG-SBA, cues to reach back   Assistive device required for transfer:   RW    Gait:    Distance:  298'   Assistance:  SBA  Device:  RW  Gait Quality:  Slow recip pattern with with no adverse effects    [] Patient limited by fatigue  [] Patient limited by pain   [] Patient limited by medical complications:    [] Adverse reaction to Tx:   [] Significant change in status    Barrier/s to progress/learning:   []   None  [x]   Cognition  []   Hearing deficit  []   Pre-morbid mental/psychological status   []   Motivation  []   Communication  []   Anxiety  []   Vision deficit  []   Attention  []   Other:      Safety:       []  bed alarm set    []  chair alarm set    []  Pt refused alarms                []  Telesitter activated      [x]  Gait belt used during tx session      []other:         Number of Minutes/Billable Intervention  Gait Training 45   Therapeutic Exercise    Neuro Re-Ed    Therapeutic Activity 15   Wheelchair Propulsion    Group    Other:    TOTAL 60         Social History  Social/Functional History  Lives With: Family(2 sons (both work but has a daughter who lives close by and helps prn))  Type of Home: House(tri-level )  Home Layout: Multi-level, Bed/Bath upstairs, 1/2 bath on main level(6 steps with R ascending rail to bedroom/full bath )  Home Access: Level entry  Bathroom Shower/Tub: Tub only  Bathroom Toilet: Standard  Home Equipment: Quad cane, Rolling walker  ADL Assistance: Independent  Homemaking Responsibilities: Yes  Meal Prep Responsibility: No  Laundry Responsibility: Primary  Cleaning Responsibility: No  Bill Paying/Finance Responsibility: Primary  Shopping Responsibility: Secondary  Dependent Care Responsibility: No  Health Care Management: Primary  Ambulation Assistance: Independent  Transfer Assistance: Independent  Active : No  Mode of Transportation: Car  Occupation: Retired  Additional Comments: sleeps in regular, flat bed; 2 falls in the past year with minor injuries; recent fall required this hospitalization     Objective                                                                                    Goals:  (Update in navigator)  Short term goals  Time Frame for Short term goals: 5-7 tx days or until discharge:   Short term goal 1: Pt will consistently complete all aspects of bed mobility and sup<->sit Ind. Short term goal 2: Pt will complete OOB transfers Mod Ind-Ind. Short term goal 3: Pt will ambulate >/=200 ft using SPC Mod Ind. Short term goal 4: Pt will ascend/descend 1 flight of stairs using 1 rail Mod Ind. Short term goal 5: Pt will ascend/descend curb step and ambulate over uneven surfaces using SPC Mod Ind. Short term goal 6: Pt will complete object retrieval from the floor with 1 UE support in standing Mod Ind. :   :        Plan of Care                                                                              Times per week: 5 days per week for a minimum of 60 minutes/day plus group as appropriate for 60 minutes.   Treatment to include Current Treatment Recommendations: Functional Mobility Training, Home Exercise Program, Equipment Evaluation, Education, & procurement, Transfer Training, Gait Training, Safety Education & Training, Balance Training, Endurance Training, Stair training, Patient/Caregiver Education & Training    Electronically signed by   Melissa Laguna, PTA #[id number]  3/30/2020, 1:07 PM

## 2020-03-30 NOTE — PROGRESS NOTES
Pt wants to lock his $87 dolors found in his sweatpants pockets in the safe in his closet. Security called and they placed in the safe with the code [id number].

## 2020-03-31 LAB
EKG ATRIAL RATE: 72 BPM
EKG DIAGNOSIS: NORMAL
EKG P AXIS: 67 DEGREES
EKG P-R INTERVAL: 162 MS
EKG Q-T INTERVAL: 406 MS
EKG QRS DURATION: 88 MS
EKG QTC CALCULATION (BAZETT): 444 MS
EKG R AXIS: 81 DEGREES
EKG T AXIS: 45 DEGREES
EKG VENTRICULAR RATE: 72 BPM
HCT VFR BLD CALC: 23.9 % (ref 42–52)
HEMOGLOBIN: 7 GM/DL (ref 13.5–18)

## 2020-03-31 PROCEDURE — 97110 THERAPEUTIC EXERCISES: CPT

## 2020-03-31 PROCEDURE — 6360000002 HC RX W HCPCS: Performed by: FAMILY MEDICINE

## 2020-03-31 PROCEDURE — 85018 HEMOGLOBIN: CPT

## 2020-03-31 PROCEDURE — 97116 GAIT TRAINING THERAPY: CPT

## 2020-03-31 PROCEDURE — 6370000000 HC RX 637 (ALT 250 FOR IP): Performed by: FAMILY MEDICINE

## 2020-03-31 PROCEDURE — 99233 SBSQ HOSP IP/OBS HIGH 50: CPT | Performed by: PHYSICAL MEDICINE & REHABILITATION

## 2020-03-31 PROCEDURE — 1280000000 HC REHAB R&B

## 2020-03-31 PROCEDURE — 94761 N-INVAS EAR/PLS OXIMETRY MLT: CPT

## 2020-03-31 PROCEDURE — 97530 THERAPEUTIC ACTIVITIES: CPT

## 2020-03-31 PROCEDURE — 97535 SELF CARE MNGMENT TRAINING: CPT

## 2020-03-31 PROCEDURE — 85014 HEMATOCRIT: CPT

## 2020-03-31 RX ORDER — PANTOPRAZOLE SODIUM 40 MG/1
40 TABLET, DELAYED RELEASE ORAL
Status: DISCONTINUED | OUTPATIENT
Start: 2020-04-01 | End: 2020-04-03 | Stop reason: HOSPADM

## 2020-03-31 RX ORDER — ONDANSETRON 4 MG/1
4 TABLET, ORALLY DISINTEGRATING ORAL 4 TIMES DAILY PRN
Status: DISCONTINUED | OUTPATIENT
Start: 2020-03-31 | End: 2020-04-03 | Stop reason: HOSPADM

## 2020-03-31 RX ADMIN — PYRIDOSTIGMINE BROMIDE 60 MG: 60 TABLET ORAL at 21:13

## 2020-03-31 RX ADMIN — ISOSORBIDE MONONITRATE 30 MG: 30 TABLET, EXTENDED RELEASE ORAL at 08:16

## 2020-03-31 RX ADMIN — ENOXAPARIN SODIUM 30 MG: 30 INJECTION SUBCUTANEOUS at 08:16

## 2020-03-31 RX ADMIN — MIDODRINE HYDROCHLORIDE 10 MG: 5 TABLET ORAL at 12:29

## 2020-03-31 RX ADMIN — PYRIDOSTIGMINE BROMIDE 60 MG: 60 TABLET ORAL at 13:03

## 2020-03-31 RX ADMIN — MIDODRINE HYDROCHLORIDE 10 MG: 5 TABLET ORAL at 17:43

## 2020-03-31 RX ADMIN — ATORVASTATIN CALCIUM 40 MG: 40 TABLET, FILM COATED ORAL at 21:13

## 2020-03-31 RX ADMIN — MIDODRINE HYDROCHLORIDE 10 MG: 5 TABLET ORAL at 08:17

## 2020-03-31 RX ADMIN — PYRIDOSTIGMINE BROMIDE 60 MG: 60 TABLET ORAL at 08:16

## 2020-03-31 RX ADMIN — ASPIRIN 81 MG: 81 TABLET, COATED ORAL at 08:16

## 2020-03-31 ASSESSMENT — PAIN SCALES - GENERAL: PAINLEVEL_OUTOF10: 0

## 2020-03-31 NOTE — PROGRESS NOTES
Eating meals but some difficulty chewing due to missing and loose teeth. Willing to drink oral nutrition supplement during stay. Will offer glucerna at this time and continue to follow up.

## 2020-03-31 NOTE — PROGRESS NOTES
set/reps of protractions using 4lb wt.'d ball and 2x15 set/reps using 5lb ball and rebounder machine. Therapist engages Pt in 1x5 and 2x10 set/reps of unilateral protractions using 4lb straight wt.    []   Standing Ther Ex (reps/sets):     []   Other:      Comments:      Patient/Caregiver Education and Training:   []   Adaptive Equipment Use  []   Bed Mobility/Transfer Technique/Safety  []   Energy Conservation Tips  []   Family training  [x]   Postural Awareness  [x]   Safety During Functional Activities  []   Reinforced Patient's Precautions   []   Progress was updated and reviewed in Rehabtracker with patient and/or family this         date. Treatment Plan for Next Session: Light IADL task, static/dynamic standing    Assessment:  Pt tolerates session w/o any adverse events, despite reporting dizziness in standing Pt BP measured at 107/66 mmHg which per nursing staff was inc. From earlier readings following medication administration.        Treatment/Activity Tolerance:   [x] Tolerated treatment with no adverse effects    [x] Patient limited by fatigue  [] Patient limited by pain   [] Patient limited by medical complications:    [] Adverse reaction to Tx:   [] Significant change in status    Safety:       []  bed alarm set    []  chair alarm set    []  Pt refused alarms                []  Telesitter activated      [x]  Gait belt used during tx session      []other:       Number of Minutes/Billable Intervention  Therapeutic Exercise 35   ADL Self-care 60   Neuro Re-Ed    Therapeutic Activity 30   Group    Other:    TOTAL 125       Social History  Social/Functional History  Lives With: Family(2 sons (both work but has a daughter who lives close by and helps prn))  Type of Home: House(tri-level )  Home Layout: Multi-level, Bed/Bath upstairs, 1/2 bath on main level(6 steps with R ascending rail to bedroom/full bath )  Home Access: Level entry  Bathroom Shower/Tub: Tub only  Bathroom Toilet: Standard  Home

## 2020-03-31 NOTE — PROGRESS NOTES
minutes.   Treatment to include Current Treatment Recommendations: Functional Mobility Training, Home Exercise Program, Equipment Evaluation, Education, & procurement, Transfer Training, Gait Training, Safety Education & Training, Balance Training, Endurance Training, Stair training, Patient/Caregiver Education & Training    Electronically signed by   Hammad Benitez, PTA #[id number]  3/31/2020, 8:30 AM

## 2020-03-31 NOTE — PROGRESS NOTES
Allegra Alvarez    : 1935  Acct #: [de-identified]  MRN: [id number]              PM&R Progress Note      Admitting diagnosis: Debility due to orthostatic hypotension     Comorbid diagnoses impacting rehabilitation: Generalized weakness, recurrent falls, peripheral neuropathy with remote history of diabetes, ischemic cardiomyopathy, anemia, probable dementia       Chief complaint: Fatigue with activity. Mild dizziness. Prior (baseline) level of function: Independent. Current level of function:         Current  IRF-YOLIE and Goals:   Hearing, Speech, and Vision  Expression of Ideas and Wants: Without difficulty  Understanding Verbal and Non-Verbal Content: Understands  Prior Functioning: Everyday Activities  Self Care: Independent  Indoor Mobility (Ambulation):  Independent  Stairs: Independent  Functional Cognition: Independent  Prior Device Use: None of the given options(Ind )    Eating  Assistance Needed: Independent  CARE Score: 6  Discharge Goal: Independent  Oral Hygiene  Assistance Needed: Supervision or touching assistance  CARE Score: 4  Discharge Goal: Independent  Toileting Hygiene  Assistance Needed: Independent  CARE Score: 6  Discharge Goal: Independent  Shower/Bathe Self  Assistance Needed: Supervision or touching assistance  CARE Score: 4  Discharge Goal: Independent  Upper Body Dressing  Assistance Needed: Supervision or touching assistance  CARE Score: 4  Discharge Goal: Independent  Lower Body Dressing  Assistance Needed: Supervision or touching assistance  CARE Score: 4  Discharge Goal: Independent  Putting On/Taking Off Footwear  Assistance Needed: Supervision or touching assistance  CARE Score: 4  Discharge Goal: Independent    Roll Left and Right  Assistance Needed: Independent  CARE Score: 6  Discharge Goal: Independent  Sit to Lying  Assistance Needed: Independent  CARE Score: 6  Discharge Goal: Independent  Sit to Stand  Assistance Needed: Supervision or touching assistance  CARE Score: 4  Discharge Goal: Independent  Chair/Bed-to-Chair Transfer  Assistance Needed: Supervision or touching assistance  CARE Score: 4  Discharge Goal: Independent  Toilet Transfer  Assistance Needed: Supervision or touching assistance  CARE Score: 4  Discharge Goal: Independent  Car Transfer  Assistance Needed: Supervision or touching assistance  CARE Score: 4  Discharge Goal: Independent   Walk 10 Feet? Walk 10 Feet?: Yes  1 Step  1 Step?: Yes  Picking Up Object  Assistance Needed: Partial/moderate assistance  CARE Score: 3  Discharge Goal: Independent  Wheelchair Ability  Uses a Wheelchair and/or Scooter?: No                  I      Exam:    Blood pressure (!) 92/46, pulse 70, temperature 97.6 °F (36.4 °C), resp. rate 16, height 5' 8\" (1.727 m), weight 144 lb 3.2 oz (65.4 kg), SpO2 100 %. General: Sitting up in bed. Alert and talkative. In good spirits. In no distress. HEENT: MMM. Neck supple. Visual fields full. Pulmonary: No wheezes or rales. Cardiac: Distant heart sounds with controlled rate. Abdomen: Patient's abdomen is soft and nondistended. Bowel sounds were present throughout. There was no rebound, guarding or masses noted. Upper extremities: Moves both upper limbs though functional range of motion with normal strength coordination. Lower extremities: Give way with manual muscle testing across the knees and ankles. Normal tone but blunted peripheral sensation. Sitting balance was good.   Standing balance was fair-.    Lab Results   Component Value Date    WBC 5.4 03/25/2020    HGB 7.2 (L) 03/25/2020    HCT 26.1 (L) 03/25/2020    MCV 87.3 03/25/2020     03/25/2020     Lab Results   Component Value Date    INR 1.19 11/14/2019    INR 1.09 03/02/2018    INR 0.95 03/12/2013    PROTIME 13.6 11/14/2019    PROTIME 12.6 (H) 03/02/2018    PROTIME 10.4 03/12/2013     Lab Results   Component Value Date    CREATININE 1.3 03/25/2020    BUN 29 (H) 03/25/2020     03/25/2020 K 4.4 03/25/2020     03/25/2020    CO2 24 03/25/2020     Lab Results   Component Value Date    ALT 13 03/25/2020    AST 21 03/25/2020    ALKPHOS 96 03/25/2020    BILITOT 0.2 03/25/2020       Expected length of stay  prior to a supervised level of function for discharge home with a walker and C OT/PT is 2 weeks. Recommendations:    1. Recurrent falls with orthostatic hypotension: He continues to experience some dizziness with position changes in his daily occupational and physical therapy.    Receiving ProAmatine and Mestinon to support his blood pressure.    Encouraging consistent oral intake, gradual changes in position and graduated increases in physical activity.  We must provide DVT prophylaxis and pulmonary hygiene as well as nutritional support.  Some impulsivity noted in his movement and speech. 2. DVT prophylaxis: Lovenox 30 mg subcu daily.  I must monitor his hemoglobin and platelet count periodically while on this medication.  Weightbearing activities are slowly increasing on a daily basis.    GI prophylaxis provided.  No new bruising or swelling. 3. Ischemic cardiomyopathy: Patient requires afterload reduction with Imdur.    Daily weights are stable off diuretics. 4. Peripheral neuropathy and remote history of diabetes: We must incorporate adaptive equipment training and strengthening exercises to help him correct for his diabetic neuropathy while addressing his orthostasis. 5. Anemia: The patient has a hemoglobin in the low 7 range. He also had a heme positive stool while taking iron. We may need to stop the Lovenox and add a PPI. No transfusion at this point.   Anticipate outpatient follow-up with the GI service.

## 2020-04-01 LAB
HCT VFR BLD CALC: 23.8 % (ref 42–52)
HEMOGLOBIN: 7 GM/DL (ref 13.5–18)
MCH RBC QN AUTO: 25.5 PG (ref 27–31)
MCHC RBC AUTO-ENTMCNC: 29.4 % (ref 32–36)
MCV RBC AUTO: 86.9 FL (ref 78–100)
PDW BLD-RTO: 14.4 % (ref 11.7–14.9)
PLATELET # BLD: 280 K/CU MM (ref 140–440)
PMV BLD AUTO: 10 FL (ref 7.5–11.1)
RBC # BLD: 2.74 M/CU MM (ref 4.6–6.2)
WBC # BLD: 6.6 K/CU MM (ref 4–10.5)

## 2020-04-01 PROCEDURE — 94761 N-INVAS EAR/PLS OXIMETRY MLT: CPT

## 2020-04-01 PROCEDURE — 97110 THERAPEUTIC EXERCISES: CPT

## 2020-04-01 PROCEDURE — 97530 THERAPEUTIC ACTIVITIES: CPT

## 2020-04-01 PROCEDURE — 94150 VITAL CAPACITY TEST: CPT

## 2020-04-01 PROCEDURE — 6370000000 HC RX 637 (ALT 250 FOR IP): Performed by: PHYSICAL MEDICINE & REHABILITATION

## 2020-04-01 PROCEDURE — 85027 COMPLETE CBC AUTOMATED: CPT

## 2020-04-01 PROCEDURE — 1280000000 HC REHAB R&B

## 2020-04-01 PROCEDURE — 99232 SBSQ HOSP IP/OBS MODERATE 35: CPT | Performed by: PHYSICAL MEDICINE & REHABILITATION

## 2020-04-01 PROCEDURE — 97116 GAIT TRAINING THERAPY: CPT

## 2020-04-01 PROCEDURE — 97535 SELF CARE MNGMENT TRAINING: CPT

## 2020-04-01 PROCEDURE — 6370000000 HC RX 637 (ALT 250 FOR IP): Performed by: FAMILY MEDICINE

## 2020-04-01 RX ADMIN — PYRIDOSTIGMINE BROMIDE 60 MG: 60 TABLET ORAL at 08:04

## 2020-04-01 RX ADMIN — ATORVASTATIN CALCIUM 40 MG: 40 TABLET, FILM COATED ORAL at 20:49

## 2020-04-01 RX ADMIN — MIDODRINE HYDROCHLORIDE 10 MG: 5 TABLET ORAL at 08:04

## 2020-04-01 RX ADMIN — PANTOPRAZOLE SODIUM 40 MG: 40 TABLET, DELAYED RELEASE ORAL at 05:37

## 2020-04-01 RX ADMIN — ASPIRIN 81 MG: 81 TABLET, COATED ORAL at 08:04

## 2020-04-01 RX ADMIN — ISOSORBIDE MONONITRATE 30 MG: 30 TABLET, EXTENDED RELEASE ORAL at 08:04

## 2020-04-01 RX ADMIN — MIDODRINE HYDROCHLORIDE 10 MG: 5 TABLET ORAL at 16:36

## 2020-04-01 RX ADMIN — PYRIDOSTIGMINE BROMIDE 60 MG: 60 TABLET ORAL at 20:49

## 2020-04-01 RX ADMIN — PYRIDOSTIGMINE BROMIDE 60 MG: 60 TABLET ORAL at 13:30

## 2020-04-01 RX ADMIN — MIDODRINE HYDROCHLORIDE 10 MG: 5 TABLET ORAL at 11:44

## 2020-04-01 ASSESSMENT — PAIN SCALES - GENERAL: PAINLEVEL_OUTOF10: 0

## 2020-04-01 NOTE — PROGRESS NOTES
Nutrition Assessment    Type and Reason for Visit: Reassess    Nutrition Recommendations:   Continue low sodium diet at this time  Dental soft foods as needed/desired by patient   Continue to offer oral nutrition supplement during stay     Nutrition Assessment: Meal intake % during stay. Good appetite, eating well despite poor dentition. Remains on low sodium diet with supplement offered. Remains low nutrition risk at this time. Malnutrition Assessment:  · Malnutrition Status: At risk for malnutrition  · Context: Acute illness or injury    Nutrition Risk Level: Low    Nutrient Needs:  · Estimated Daily Total Kcal: 9599-2619 (25-30 xavier/kg)   · Estimated Daily Protein (g): 69-82 (1-1.2 g/kg)  · Estimated Daily Total Fluid (ml/day): 3818-6379 (1ml/xavier)     Nutrition Diagnosis:   · Problem: No nutrition diagnosis at this time    Objective Information:  · Nutrition-Focused Physical Findings: sititng up in chair waiting on therapy, very talkative, JOSE in room   · Wound Type: Skin Tears  · Current Nutrition Therapies:  · Oral Diet Orders: 2gm Sodium   · Oral Diet intake: %  · Oral Nutrition Supplement (ONS) Orders: Diabetic Oral Supplement  · ONS intake: Unable to assess  · Anthropometric Measures:  · Ht: 5' 8\" (172.7 cm)   · Current Body Wt: 141 lb 1.5 oz (64 kg)  · Admission Body Wt: 144 lb 13.5 oz (65.7 kg)  · Usual Body Wt:    · % Weight Change:  ,  fluctuating weights   · Ideal Body Wt: 154 lb (69.9 kg), % Ideal Body 91  · BMI Classification: BMI 18.5 - 24.9 Normal Weight(BMI-21. 5)    Nutrition Interventions:   Continue current diet, Continue current ONS  Continued Inpatient Monitoring    Nutrition Evaluation:   · Evaluation: Progressing toward goals   · Goals: Patient will consume at least 75% at meals during stay     · Monitoring: Meal Intake, Supplement Intake, Pertinent Labs, Weight, Diet Tolerance      Electronically signed by Elisha Celaya RD, GENARO on 4/1/20 at 10:49 AM EDT    Contact Number: [phone]

## 2020-04-01 NOTE — PROGRESS NOTES
Hillary Crocker    : 1935  Acct #: [de-identified]  MRN: [id number]              PM&R Progress Note      Admitting diagnosis: Debility due to orthostatic hypotension     Comorbid diagnoses impacting rehabilitation: Generalized weakness, recurrent falls, peripheral neuropathy with remote history of diabetes, ischemic cardiomyopathy, anemia, probable dementia     Chief complaint: Tired but no new dizziness or difficulty breathing. Prior (baseline) level of function: Independent. Current level of function:         Current  IRF-YOLIE and Goals:   Hearing, Speech, and Vision  Expression of Ideas and Wants: Without difficulty  Understanding Verbal and Non-Verbal Content: Understands  Prior Functioning: Everyday Activities  Self Care: Independent  Indoor Mobility (Ambulation):  Independent  Stairs: Independent  Functional Cognition: Independent  Prior Device Use: None of the given options(Ind )    Eating  Assistance Needed: Independent  CARE Score: 6  Discharge Goal: Independent  Oral Hygiene  Assistance Needed: Supervision or touching assistance  CARE Score: 4  Discharge Goal: Independent  Toileting Hygiene  Assistance Needed: Independent  CARE Score: 6  Discharge Goal: Independent  Shower/Bathe Self  Assistance Needed: Supervision or touching assistance  CARE Score: 4  Discharge Goal: Independent  Upper Body Dressing  Assistance Needed: Supervision or touching assistance  CARE Score: 4  Discharge Goal: Independent  Lower Body Dressing  Assistance Needed: Supervision or touching assistance  CARE Score: 4  Discharge Goal: Independent  Putting On/Taking Off Footwear  Assistance Needed: Supervision or touching assistance  CARE Score: 4  Discharge Goal: Independent    Roll Left and Right  Assistance Needed: Independent  CARE Score: 6  Discharge Goal: Independent  Sit to Lying  Assistance Needed: Independent  CARE Score: 6  Discharge Goal: Independent  Sit to Stand  Assistance Needed: Supervision or touching

## 2020-04-01 NOTE — PROGRESS NOTES
dressing with Ind  Long term goal 5: Pt to complete toileting with Ind  Long term goals 6: Pt to complete all transfers (bed, toilet, shower) with Mod(I)  Long term goal 7: Pt to doff/don footwear with Ind  Long term goal 8: Pt to complete ongoing therex/act. w/emphasis on >5-8 min static/dynamic stand for ADL/IADL task completion:        Plan of Care                                                                              Times per week: 5 days per week for a minimum of 60 minutes/day plus group as appropriate for 60 minutes.   Treatment to include Plan  Times per day: Daily  Current Treatment Recommendations: Strengthening, Endurance Training, Patient/Caregiver Education & Training, Cognitive Reorientation, Equipment Evaluation, Education, & procurement, Balance Training, Pain Management, Self-Care / ADL, Functional Mobility Training, Safety Education & Training, Home Management Training, Cognitive/Perceptual Training, Positioning    Electronically signed by   ROC Bennett  4/1/2020, 10:36 AM

## 2020-04-01 NOTE — PROGRESS NOTES
transfers Mod Ind-Ind. Short term goal 3: Pt will ambulate >/=200 ft using SPC Mod Ind. Short term goal 4: Pt will ascend/descend 1 flight of stairs using 1 rail Mod Ind. Short term goal 5: Pt will ascend/descend curb step and ambulate over uneven surfaces using SPC Mod Ind. Short term goal 6: Pt will complete object retrieval from the floor with 1 UE support in standing Mod Ind. :   :        Plan of Care                                                                              Times per week: 5 days per week for a minimum of 60 minutes/day plus group as appropriate for 60 minutes.   Treatment to include Current Treatment Recommendations: Functional Mobility Training, Home Exercise Program, Equipment Evaluation, Education, & procurement, Transfer Training, Gait Training, Safety Education & Training, Balance Training, Endurance Training, Stair training, Patient/Caregiver Education & Training    Electronically signed by   Mike Murillo PTA #[id number]  4/1/2020, 12:32 PM

## 2020-04-01 NOTE — PROGRESS NOTES
Occupational Therapy  Physical Rehabilitation: OCCUPATIONAL THERAPY     [x] daily progress note       [] discharge       Patient Name:  Shankar Chowdhury   :  1935 MRN: [id number]  Room:  [address] [place] [address] Date of Admission: 3/25/2020  Rehabilitation Diagnosis:   Debility [R53.81]       Date 2020       Day of ARU Week:  1   Time IN/OUT 8:30/9:30   Individual Tx Minutes 60   Group Tx Minutes    Co-Treat Minutes    Concurrent Tx Minutes    TOTAL Tx Time Mins 60   Variance Time    Variance Time []   Refusal due to:     []   Medical hold/reason:    []   Illness   []   Off Unit for test/procedure  []   Extra time needed to complete task  []   Therapeutic need  []   Other (specify):   Restrictions Restrictions/Precautions  Restrictions/Precautions: Fall Risk, General Precautions(, monitor dizziness/orthostatic hypotension )  Position Activity Restriction  Other position/activity restrictions: saline lock LUE    Communication with other providers: [x]   OK to see per nursing:     []   Spoke with team member regarding:      Subjective observations and cognitive status: Pt declines ADL activity stating, \"There ain't know sense in getting washed up I ain't dirty\"! Therapist eductaed Pt on role of OT as well as maintaining cleanliness for optimal hygiene. Pt unreceptive. Pain level/location:    0/10       Location:    Discharge recommendations  Anticipated discharge date:  4-3  Destination: []home alone   []home alone w assist prn [] home w/ family    [x] Continuous supervision       []SNF            [] Assisted living     [] Other:   Continued therapy: [x]HHC OT  []OUTPATIENT  OT   [] No Further OT  Equipment needs:   Shankar Chowdhury requires a 3 in 1 bedside commode due to being unable to use the toilet within the home  And is confined to one level of the home.      (HIT F2 to transition between stars)    Dressing:      Upper Body Dressing:  Sup; no issues donning, however, slight unsteadiness  When retrieving from Paying/Finance Responsibility: Primary  Shopping Responsibility: Secondary  Dependent Care Responsibility: No  Health Care Management: Primary  Ambulation Assistance: Independent  Transfer Assistance: Independent  Active : No  Mode of Transportation: Car  Occupation: Retired  Additional Comments: sleeps in regular, flat bed; 2 falls in the past year with minor injuries; recent fall required this hospitalization     Objective                                                                                    Goals:  (Update in navigator)  Short term goals  Time Frame for Short term goals: STG=LTG:  Long term goals  Time Frame for Long term goals : 3-5 days or until D/C  Long term goal 1: Pt to complete grooming with Ind  Long term goal 2: Pt to complete bathing using AE/DME PRN with Mod(I)  Long term goal 3: Pt to complete UB dressing with Ind  Long term goal 4: Pt to complete LB dressing with Ind  Long term goal 5: Pt to complete toileting with Ind  Long term goals 6: Pt to complete all transfers (bed, toilet, shower) with Mod(I)  Long term goal 7: Pt to doff/don footwear with Ind  Long term goal 8: Pt to complete ongoing therex/act. w/emphasis on >5-8 min static/dynamic stand for ADL/IADL task completion:        Plan of Care                                                                              Times per week: 5 days per week for a minimum of 60 minutes/day plus group as appropriate for 60 minutes.   Treatment to include Plan  Times per day: Daily  Current Treatment Recommendations: Strengthening, Endurance Training, Patient/Caregiver Education & Training, Cognitive Reorientation, Equipment Evaluation, Education, & procurement, Balance Training, Pain Management, Self-Care / ADL, Functional Mobility Training, Safety Education & Training, Home Management Training, Cognitive/Perceptual Training, Positioning    Electronically signed by   Adela Boo MS, OTR/L  4/1/2020, 8:57 AM

## 2020-04-02 PROCEDURE — 97530 THERAPEUTIC ACTIVITIES: CPT

## 2020-04-02 PROCEDURE — 97110 THERAPEUTIC EXERCISES: CPT

## 2020-04-02 PROCEDURE — 94761 N-INVAS EAR/PLS OXIMETRY MLT: CPT

## 2020-04-02 PROCEDURE — 1280000000 HC REHAB R&B

## 2020-04-02 PROCEDURE — 97116 GAIT TRAINING THERAPY: CPT

## 2020-04-02 PROCEDURE — 99232 SBSQ HOSP IP/OBS MODERATE 35: CPT | Performed by: PHYSICAL MEDICINE & REHABILITATION

## 2020-04-02 PROCEDURE — 97535 SELF CARE MNGMENT TRAINING: CPT

## 2020-04-02 PROCEDURE — 6370000000 HC RX 637 (ALT 250 FOR IP): Performed by: FAMILY MEDICINE

## 2020-04-02 PROCEDURE — 51798 US URINE CAPACITY MEASURE: CPT

## 2020-04-02 PROCEDURE — 6370000000 HC RX 637 (ALT 250 FOR IP): Performed by: PHYSICAL MEDICINE & REHABILITATION

## 2020-04-02 PROCEDURE — 94150 VITAL CAPACITY TEST: CPT

## 2020-04-02 RX ORDER — PANTOPRAZOLE SODIUM 40 MG/1
40 TABLET, DELAYED RELEASE ORAL
Qty: 30 TABLET | Refills: 0 | Status: SHIPPED | OUTPATIENT
Start: 2020-04-03

## 2020-04-02 RX ORDER — MIDODRINE HYDROCHLORIDE 10 MG/1
10 TABLET ORAL
Qty: 90 TABLET | Refills: 0 | Status: SHIPPED | OUTPATIENT
Start: 2020-04-02

## 2020-04-02 RX ADMIN — MIDODRINE HYDROCHLORIDE 10 MG: 5 TABLET ORAL at 08:14

## 2020-04-02 RX ADMIN — PYRIDOSTIGMINE BROMIDE 60 MG: 60 TABLET ORAL at 13:02

## 2020-04-02 RX ADMIN — MIDODRINE HYDROCHLORIDE 10 MG: 5 TABLET ORAL at 17:27

## 2020-04-02 RX ADMIN — PYRIDOSTIGMINE BROMIDE 60 MG: 60 TABLET ORAL at 08:14

## 2020-04-02 RX ADMIN — ASPIRIN 81 MG: 81 TABLET, COATED ORAL at 08:14

## 2020-04-02 RX ADMIN — PANTOPRAZOLE SODIUM 40 MG: 40 TABLET, DELAYED RELEASE ORAL at 06:15

## 2020-04-02 RX ADMIN — ATORVASTATIN CALCIUM 40 MG: 40 TABLET, FILM COATED ORAL at 20:03

## 2020-04-02 RX ADMIN — MIDODRINE HYDROCHLORIDE 10 MG: 5 TABLET ORAL at 11:17

## 2020-04-02 RX ADMIN — PYRIDOSTIGMINE BROMIDE 60 MG: 60 TABLET ORAL at 20:03

## 2020-04-02 RX ADMIN — ISOSORBIDE MONONITRATE 30 MG: 30 TABLET, EXTENDED RELEASE ORAL at 08:14

## 2020-04-02 ASSESSMENT — PAIN SCALES - GENERAL: PAINLEVEL_OUTOF10: 0

## 2020-04-02 NOTE — PROGRESS NOTES
treatment with no adverse effects    [] Patient limited by fatigue  [] Patient limited by pain   [] Patient limited by medical complications:    [] Adverse reaction to Tx:   [] Significant change in status    Safety:       []  bed alarm set    [x]  chair alarm set    []  Pt refused alarms                [x]  Telesitter activated      [x]  Gait belt used during tx session      []other:         Number of Minutes/Billable Intervention  Gait Training 30   Therapeutic Exercise 10   Neuro Re-Ed    Therapeutic Activity 20   Wheelchair Propulsion    Group    Other:    TOTAL 60         Social History  Social/Functional History  Lives With: Family(2 sons (both work but has a daughter who lives close by and helps prn))  Type of Home: House(tri-level )  Home Layout: Multi-level, Bed/Bath upstairs, 1/2 bath on main level(6 steps with R ascending rail to bedroom/full bath )  Home Access: Level entry  Bathroom Shower/Tub: Tub only  Bathroom Toilet: Standard  Home Equipment: Quad cane, Rolling walker  ADL Assistance: Independent  Homemaking Responsibilities: Yes  Meal Prep Responsibility: No  Laundry Responsibility: Primary  Cleaning Responsibility: No  Bill Paying/Finance Responsibility: Primary  Shopping Responsibility: Secondary  Dependent Care Responsibility: No  Health Care Management: Primary  Ambulation Assistance: Independent  Transfer Assistance: Independent  Active : No  Mode of Transportation: Car  Occupation: Retired  Additional Comments: sleeps in regular, flat bed; 2 falls in the past year with minor injuries; recent fall required this hospitalization     Objective                                                                                    Goals:  (Update in navigator)  Short term goals  Time Frame for Short term goals: 5-7 tx days or until discharge:   Short term goal 1: Pt will consistently complete all aspects of bed mobility and sup<->sit Ind.    Short term goal 2: Pt will complete OOB transfers Mod

## 2020-04-02 NOTE — PROGRESS NOTES
Occupational Therapy  Physical Rehabilitation: OCCUPATIONAL THERAPY     [x] daily progress note       [] discharge       Patient Name:  Allegra Alvarez   :  1935 MRN: [id number]  Room:  [address] [place] [address] Date of Admission: 3/25/2020  Rehabilitation Diagnosis:   Debility [R53.81]       Date 2020       Day of ARU Week:  2   Time IN/OUT 8:30/9:30   Individual Tx Minutes 60   Group Tx Minutes    Co-Treat Minutes    Concurrent Tx Minutes    TOTAL Tx Time Mins 60   Variance Time    Variance Time []   Refusal due to:     []   Medical hold/reason:    []   Illness   []   Off Unit for test/procedure  []   Extra time needed to complete task  []   Therapeutic need  []   Other (specify):   Restrictions Restrictions/Precautions  Restrictions/Precautions: Fall Risk, General Precautions(JOSE, monitor dizziness/orthostatic hypotension )  Position Activity Restriction  Other position/activity restrictions: saline lock LUE    Communication with other providers: [x]   OK to see per nursing:     []   Spoke with team member regarding:      Subjective observations and cognitive status: Pt upright EOB finishing breakfast and agreeable to therapy session. Pain level/location:    0/10       Location:    Discharge recommendations  Anticipated discharge date: 4-3  Destination: []home alone   []home alone w assist prn [] home w/ family    [x] Continuous supervision       []SNF            [] Assisted living     [] Other:   Continued therapy: [x]HHC OT  []OUTPATIENT  OT   [] No Further OT  Equipment needs:   Allegra Alvarez requires a 3 in 1 bedside commode due to being unable to use the toilet within the home  And is confined to one level of the home. (HIT F2 to transition between stars)    Eating/Feeding:  Ind; no issues   Extremity Used:        []    R UE []    L UE         Dentures: []   N/A    []    Partial []    Full  Adaptive Equipment Used:        Grooming:   Ind of washing hands and face.    Oral Hygiene: Ind; uses mouth swab

## 2020-04-02 NOTE — PROGRESS NOTES
Time:        Level:         #Steps:       []   Rebounder:    []  Seated     []  Standing        []   Supine Ther Ex (reps/sets):     [x]   Seated Ther Ex (reps/sets): For BUE strengthening for ADL indep & functional moblility, pt completed arm bike use x 15 min c Min resistance with 0 rest breaks & with min difficulty. For BUE strengthening for ADL & functional mobility Indep pt performed BUE strengthening HEP using Medium strength theraband  X 5 exer for R/L UE x 15 reps c minimal rest breaks. For BUE strengthening for ADL & functional mobility Indep pt performed BUE strengthening HEP c 3# hand weights x 10 reps x 6 exercises with Minimal difficulty. [x]   Standing Ther Ex (reps/sets):  Pt stood x 10 min at railing with SBA while completing dynamic stand task while reaching outside base of support to facilitate increased balance for ADL tasks and transfers. []   Other:      Comments:      Patient/Caregiver Education and Training:   []   [employer] Equipment Use  [x]   Bed Mobility/Transfer Technique/Safety  [x]   Energy Conservation Tips  []   Family training  [x]   Postural Awareness  [x]   Safety During Functional Activities  []   Reinforced Patient's Precautions   []   Progress was updated and reviewed in Rehabtracker with patient and/or family this         date. Treatment Plan for Next Session: Continue with POC.         Treatment/Activity Tolerance:   [x] Tolerated treatment with no adverse effects    [x] Patient limited by fatigue  [x] Patient limited by pain   [] Patient limited by medical complications:    [] Adverse reaction to Tx:   [] Significant change in status    Safety:       [x]  bed alarm set    []  chair alarm set    []  Pt refused alarms                []  Telesitter activated      [x]  Gait belt used during tx session      []other:       Number of Minutes/Billable Intervention  Therapeutic Exercise 45   ADL Self-care 0   Neuro Re-Ed 0   Therapeutic Activity 15   Group 0   Other: 0   TOTAL 60       Social History  Social/Functional History  Lives With: Family(2 sons (both work but has a daughter who lives close by and helps prn))  Type of Home: House(tri-level )  Home Layout: Multi-level, Bed/Bath upstairs, 1/2 bath on main level(6 steps with R ascending rail to bedroom/full bath )  Home Access: Level entry  Bathroom Shower/Tub: Tub only  Bathroom Toilet: Standard  Home Equipment: Quad cane, Rolling walker  ADL Assistance: Independent  Homemaking Responsibilities: Yes  Meal Prep Responsibility: No  Laundry Responsibility: Primary  Cleaning Responsibility: No  Bill Paying/Finance Responsibility: Primary  Shopping Responsibility: Secondary  Dependent Care Responsibility: No  Health Care Management: Primary  Ambulation Assistance: Independent  Transfer Assistance: Independent  Active : No  Mode of Transportation: Car  Occupation: Retired  Additional Comments: sleeps in regular, flat bed; 2 falls in the past year with minor injuries; recent fall required this hospitalization     Objective                                                                                    Goals:  (Update in navigator)  Short term goals  Time Frame for Short term goals: STG=LTG:  Long term goals  Time Frame for Long term goals : 3-5 days or until D/C  Long term goal 1: Pt to complete grooming with Ind  Long term goal 2: Pt to complete bathing using AE/DME PRN with Mod(I)  Long term goal 3: Pt to complete UB dressing with Ind  Long term goal 4: Pt to complete LB dressing with Ind  Long term goal 5: Pt to complete toileting with Ind  Long term goals 6: Pt to complete all transfers (bed, toilet, shower) with Mod(I)  Long term goal 7: Pt to doff/don footwear with Ind  Long term goal 8: Pt to complete ongoing therex/act. w/emphasis on >5-8 min static/dynamic stand for ADL/IADL task completion:        Plan of Care                                                                              Times per week: 5 days

## 2020-04-02 NOTE — PLAN OF CARE
Problem: Falls - Risk of:  Goal: Will remain free from falls  Description: Will remain free from falls  4/2/2020 0920 by Levy Horn  Outcome: Ongoing  4/1/2020 2206 by Enrrique Whitt RN  Outcome: Ongoing  Goal: Absence of physical injury  Description: Absence of physical injury  4/2/2020 0920 by Levy Horn  Outcome: Ongoing  4/1/2020 2206 by Enrrique Whitt RN  Outcome: Ongoing     Problem: Infection:  Goal: Will remain free from infection  Description: Will remain free from infection  4/2/2020 0920 by Levy Horn  Outcome: Ongoing  4/1/2020 2206 by Enrrique Whitt RN  Outcome: Ongoing     Problem: Safety:  Goal: Free from accidental physical injury  Description: Free from accidental physical injury  4/2/2020 0920 by Levy Horn  Outcome: Ongoing  4/1/2020 2206 by Enrrique Whitt RN  Outcome: Ongoing  Goal: Free from intentional harm  Description: Free from intentional harm  4/2/2020 0920 by Levy Horn  Outcome: Ongoing  4/1/2020 2206 by Enrrique Whitt RN  Outcome: Ongoing     Problem: Daily Care:  Goal: Daily care needs are met  Description: Daily care needs are met  4/2/2020 0920 by Levy Horn  Outcome: Ongoing  4/1/2020 2206 by Enrrique Whitt RN  Outcome: Ongoing     Problem: Pain:  Goal: Patient's pain/discomfort is manageable  Description: Patient's pain/discomfort is manageable  4/2/2020 0920 by Levy Horn  Outcome: Ongoing  4/1/2020 2206 by Enrrique Whitt RN  Outcome: Ongoing     Problem: Skin Integrity:  Goal: Skin integrity will stabilize  Description: Skin integrity will stabilize  4/2/2020 0920 by Levy Horn  Outcome: Ongoing  4/1/2020 2206 by Enrrique Whitt RN  Outcome: Ongoing     Problem: Discharge Planning:  Goal: Patients continuum of care needs are met  Description: Patients continuum of care needs are met  4/2/2020 0920 by Levy Horn  Outcome: Ongoing  4/1/2020 2206 by Enrrique Whitt RN  Outcome: Ongoing

## 2020-04-03 VITALS
WEIGHT: 140.7 LBS | RESPIRATION RATE: 16 BRPM | HEART RATE: 79 BPM | SYSTOLIC BLOOD PRESSURE: 107 MMHG | OXYGEN SATURATION: 99 % | BODY MASS INDEX: 21.32 KG/M2 | TEMPERATURE: 97.7 F | DIASTOLIC BLOOD PRESSURE: 50 MMHG | HEIGHT: 68 IN

## 2020-04-03 PROCEDURE — 6370000000 HC RX 637 (ALT 250 FOR IP): Performed by: PHYSICAL MEDICINE & REHABILITATION

## 2020-04-03 PROCEDURE — 99239 HOSP IP/OBS DSCHRG MGMT >30: CPT | Performed by: PHYSICAL MEDICINE & REHABILITATION

## 2020-04-03 PROCEDURE — 94761 N-INVAS EAR/PLS OXIMETRY MLT: CPT

## 2020-04-03 PROCEDURE — 6370000000 HC RX 637 (ALT 250 FOR IP): Performed by: FAMILY MEDICINE

## 2020-04-03 PROCEDURE — 94150 VITAL CAPACITY TEST: CPT

## 2020-04-03 RX ADMIN — MIDODRINE HYDROCHLORIDE 10 MG: 5 TABLET ORAL at 11:06

## 2020-04-03 RX ADMIN — ISOSORBIDE MONONITRATE 30 MG: 30 TABLET, EXTENDED RELEASE ORAL at 09:18

## 2020-04-03 RX ADMIN — PYRIDOSTIGMINE BROMIDE 60 MG: 60 TABLET ORAL at 13:11

## 2020-04-03 RX ADMIN — PANTOPRAZOLE SODIUM 40 MG: 40 TABLET, DELAYED RELEASE ORAL at 05:55

## 2020-04-03 RX ADMIN — PYRIDOSTIGMINE BROMIDE 60 MG: 60 TABLET ORAL at 09:18

## 2020-04-03 RX ADMIN — MIDODRINE HYDROCHLORIDE 10 MG: 5 TABLET ORAL at 09:18

## 2020-04-03 RX ADMIN — ASPIRIN 81 MG: 81 TABLET, COATED ORAL at 09:18

## 2020-04-03 NOTE — DISCHARGE SUMMARY
Patient Name: Valentina Murray  Patient :  1935  Patient MRN:   [id number]      Admission Date:  3/25/2020  Discharge Date: 4/3/2020    Admitting diagnosis: Debility due to orthostatic hypotension     Comorbid diagnoses impacting rehabilitation: Generalized weakness, recurrent falls, peripheral neuropathy with remote history of diabetes, ischemic cardiomyopathy, anemia, probable dementia     Discharging diagnosis: Debility due to orthostatic hypotension     Comorbid diagnoses impacting rehabilitation: Generalized weakness, recurrent falls, peripheral neuropathy with remote history of diabetes, ischemic cardiomyopathy, anemia, probable dementia     History of present illness: Patient is an 78-year-old right-hand-dominant male who presented to our ED on 3/24/2020 with yet another fall. He described some minor limb soreness but no major head or spinal injury. His recall was somewhat inconsistent, but from the medical record it appeared that he had some syncopal episodes. Orthostasis was described initially in the hospital.  Medications were adjusted to try to stabilize his blood pressure. He had anemia and fatigue with his cardiomyopathy. He denied a change in vision, speech or swallowing. Prior (baseline) level of function: Independent. Current level of function:         Current  IRF-YOLIE and Goals:   Hearing, Speech, and Vision  Expression of Ideas and Wants: Without difficulty  Understanding Verbal and Non-Verbal Content: Understands  Prior Functioning: Everyday Activities  Self Care: Independent  Indoor Mobility (Ambulation):  Independent  Stairs: Independent  Functional Cognition: Independent  Prior Device Use: None of the given options(Ind )    Eating  Assistance Needed: Independent  CARE Score: 6  Discharge Goal: Independent  Oral Hygiene  Assistance Needed: Independent  CARE Score: 6  Discharge Goal: Independent  Toileting Hygiene  Assistance Needed: Independent  CARE Score: 6  Discharge Goal: strength. Lower extremities: No new swelling or bruising. 4-/5 strength throughout. Sitting balance was good. Standing balance was fair. Lab Results   Component Value Date    WBC 6.6 04/01/2020    HGB 7.0 (L) 04/01/2020    HCT 23.8 (L) 04/01/2020    MCV 86.9 04/01/2020     04/01/2020     Lab Results   Component Value Date    INR 1.19 11/14/2019    INR 1.09 03/02/2018    INR 0.95 03/12/2013    PROTIME 13.6 11/14/2019    PROTIME 12.6 (H) 03/02/2018    PROTIME 10.4 03/12/2013     Lab Results   Component Value Date    CREATININE 1.3 03/25/2020    BUN 29 (H) 03/25/2020     03/25/2020    K 4.4 03/25/2020     03/25/2020    CO2 24 03/25/2020     Lab Results   Component Value Date    ALT 13 03/25/2020    AST 21 03/25/2020    ALKPHOS 96 03/25/2020    BILITOT 0.2 03/25/2020     The patient presented to the ARU with the above history requiring a multidisciplinary treatment plan including close medical supervision by the Natali Fernandez Director. The patient participated in the prescribed therapy treatment plan with reasonable compliance and progressive tolerance. They avoided significant medical complications. By the time of discharge the patient had become safer with adaptive equipment to transfer and toilet with a FWW with the supervision of family/caregivers. The patient was tolerating an oral diet without choking/coughing and was back to their baseline with regards to bowel and bladder control. Discharge instructions were reviewed with the patient. The patient is to follow up with the PCP in 1 week. No driving. [facility] PT/OT/RN will be arranged.      De Cisneros   Home Medication Instructions ODV:[id number]    Printed on:04/03/20 5102   Medication Information                      aspirin EC 81 MG EC tablet  Take 1 tablet by mouth daily             atorvastatin (LIPITOR) 40 MG tablet  Take 1 tablet by mouth nightly             folic acid (FOLVITE) 1 MG tablet  Take 1 tablet by mouth daily

## 2020-04-03 NOTE — PROGRESS NOTES
Patient's daughter informed nurse, Omari Diez, that they already have a beside commode that was obtained in 2019. MSW called [employer] and they confirmed that they provided one last year. They will pick this bedside commode up in .  INGRIS Shaver/RIGO  4/3/2020, 3:21 PM

## 2020-04-03 NOTE — PROGRESS NOTES
assistance  CARE Score: 4  Discharge Goal: Independent  Toilet Transfer  Assistance Needed: Independent  CARE Score: 6  Discharge Goal: Independent  Car Transfer  Assistance Needed: Independent  CARE Score: 6  Discharge Goal: Independent   Walk 10 Feet? Walk 10 Feet?: Yes  1 Step  1 Step?: Yes  Picking Up Object  Assistance Needed: Supervision or touching assistance  CARE Score: 4  Discharge Goal: Independent  Wheelchair Ability  Uses a Wheelchair and/or Scooter?: No                  I      Exam:    Blood pressure (!) 106/50, pulse 66, temperature 97.8 °F (36.6 °C), resp. rate 16, height 5' 8\" (1.727 m), weight 140 lb 11.2 oz (63.8 kg), SpO2 95 %. General: Sitting up in a bedside chair. Talkative. In good spirits. Worried about his eyeglasses. Looking forward to discharge tomorrow. HEENT: MMM. Neck supple. Pulmonary: Chest clear. Cardiac: Occasional premature beat with controlled rate. Abdomen: Patient's abdomen is soft and nondistended. Bowel sounds were present throughout. There was no rebound, guarding or masses noted. Upper extremities: Fair coordination with functional strength. Lower extremities: No new swelling or bruising. 4-/5 strength throughout. Sitting balance was good. Standing balance was fair.     Lab Results   Component Value Date    WBC 6.6 04/01/2020    HGB 7.0 (L) 04/01/2020    HCT 23.8 (L) 04/01/2020    MCV 86.9 04/01/2020     04/01/2020     Lab Results   Component Value Date    INR 1.19 11/14/2019    INR 1.09 03/02/2018    INR 0.95 03/12/2013    PROTIME 13.6 11/14/2019    PROTIME 12.6 (H) 03/02/2018    PROTIME 10.4 03/12/2013     Lab Results   Component Value Date    CREATININE 1.3 03/25/2020    BUN 29 (H) 03/25/2020     03/25/2020    K 4.4 03/25/2020     03/25/2020    CO2 24 03/25/2020     Lab Results   Component Value Date    ALT 13 03/25/2020    AST 21 03/25/2020    ALKPHOS 96 03/25/2020    BILITOT 0.2 03/25/2020       Expected length of

## 2020-04-18 ENCOUNTER — HOSPITAL ENCOUNTER (INPATIENT)
Age: 85
LOS: 5 days | Discharge: NURSING, REHAB OR LONG-TERM CARE | DRG: 280 | End: 2020-04-23
Attending: EMERGENCY MEDICINE | Admitting: INTERNAL MEDICINE
Payer: MEDICARE

## 2020-04-18 ENCOUNTER — APPOINTMENT (OUTPATIENT)
Dept: GENERAL RADIOLOGY | Age: 85
DRG: 280 | End: 2020-04-18
Payer: MEDICARE

## 2020-04-18 PROBLEM — D62 ACUTE POST-HEMORRHAGIC ANEMIA: Status: ACTIVE | Noted: 2020-04-18

## 2020-04-18 LAB
ALBUMIN SERPL-MCNC: 3.3 GM/DL (ref 3.4–5)
ALP BLD-CCNC: 457 IU/L (ref 40–129)
ALT SERPL-CCNC: 342 U/L (ref 10–40)
ANION GAP SERPL CALCULATED.3IONS-SCNC: 12 MMOL/L (ref 4–16)
AST SERPL-CCNC: 195 IU/L (ref 15–37)
BASOPHILS ABSOLUTE: 0 K/CU MM
BASOPHILS RELATIVE PERCENT: 0.3 % (ref 0–1)
BILIRUB SERPL-MCNC: 0.7 MG/DL (ref 0–1)
BUN BLDV-MCNC: 57 MG/DL (ref 6–23)
CALCIUM SERPL-MCNC: 8.2 MG/DL (ref 8.3–10.6)
CHLORIDE BLD-SCNC: 103 MMOL/L (ref 99–110)
CO2: 20 MMOL/L (ref 21–32)
CREAT SERPL-MCNC: 2.4 MG/DL (ref 0.9–1.3)
DIFFERENTIAL TYPE: ABNORMAL
EKG ATRIAL RATE: 84 BPM
EKG DIAGNOSIS: NORMAL
EKG Q-T INTERVAL: 418 MS
EKG QRS DURATION: 78 MS
EKG QTC CALCULATION (BAZETT): 485 MS
EKG R AXIS: 75 DEGREES
EKG T AXIS: 251 DEGREES
EKG VENTRICULAR RATE: 81 BPM
EOSINOPHILS ABSOLUTE: 0 K/CU MM
EOSINOPHILS RELATIVE PERCENT: 0.2 % (ref 0–3)
GFR AFRICAN AMERICAN: 31 ML/MIN/1.73M2
GFR NON-AFRICAN AMERICAN: 26 ML/MIN/1.73M2
GLUCOSE BLD-MCNC: 139 MG/DL (ref 70–99)
HCT VFR BLD CALC: 24.4 % (ref 42–52)
HEMOGLOBIN: ABNORMAL GM/DL (ref 13.5–18)
IMMATURE NEUTROPHIL %: 0.3 % (ref 0–0.43)
LACTIC ACID, SEPSIS: 1.6 MMOL/L (ref 0.5–1.9)
LACTIC ACID, SEPSIS: 3.2 MMOL/L (ref 0.5–1.9)
LACTIC ACID, SEPSIS: 3.4 MMOL/L (ref 0.5–1.9)
LACTIC ACID, SEPSIS: ABNORMAL MMOL/L (ref 0.5–1.9)
LIPASE: 133 IU/L (ref 13–60)
LYMPHOCYTES ABSOLUTE: 0.4 K/CU MM
LYMPHOCYTES RELATIVE PERCENT: 7.1 % (ref 24–44)
MCH RBC QN AUTO: 22.4 PG (ref 27–31)
MCHC RBC AUTO-ENTMCNC: 27 % (ref 32–36)
MCV RBC AUTO: 82.7 FL (ref 78–100)
MONOCYTES ABSOLUTE: 0.6 K/CU MM
MONOCYTES RELATIVE PERCENT: 10.4 % (ref 0–4)
NUCLEATED RBC %: 1.4 %
PDW BLD-RTO: 17.9 % (ref 11.7–14.9)
PLATELET # BLD: 194 K/CU MM (ref 140–440)
PMV BLD AUTO: 12.8 FL (ref 7.5–11.1)
POTASSIUM SERPL-SCNC: ABNORMAL MMOL/L (ref 3.5–5.1)
PRO-BNP: ABNORMAL PG/ML
RBC # BLD: 2.95 M/CU MM (ref 4.6–6.2)
REASON FOR REJECTION: NORMAL
REASON FOR REJECTION: NORMAL
REJECTED TEST: NORMAL
SEGMENTED NEUTROPHILS ABSOLUTE COUNT: 4.8 K/CU MM
SEGMENTED NEUTROPHILS RELATIVE PERCENT: 81.7 % (ref 36–66)
SODIUM BLD-SCNC: 135 MMOL/L (ref 135–145)
TOTAL IMMATURE NEUTOROPHIL: 0.02 K/CU MM
TOTAL NUCLEATED RBC: 0.1 K/CU MM
TOTAL PROTEIN: 5.3 GM/DL (ref 6.4–8.2)
TROPONIN T: 0.21 NG/ML
TROPONIN T: 0.24 NG/ML
TROPONIN T: 0.24 NG/ML
WBC # BLD: 5.9 K/CU MM (ref 4–10.5)

## 2020-04-18 PROCEDURE — 83880 ASSAY OF NATRIURETIC PEPTIDE: CPT

## 2020-04-18 PROCEDURE — 71045 X-RAY EXAM CHEST 1 VIEW: CPT

## 2020-04-18 PROCEDURE — 2580000003 HC RX 258: Performed by: EMERGENCY MEDICINE

## 2020-04-18 PROCEDURE — 94761 N-INVAS EAR/PLS OXIMETRY MLT: CPT

## 2020-04-18 PROCEDURE — C9113 INJ PANTOPRAZOLE SODIUM, VIA: HCPCS | Performed by: EMERGENCY MEDICINE

## 2020-04-18 PROCEDURE — 87040 BLOOD CULTURE FOR BACTERIA: CPT

## 2020-04-18 PROCEDURE — 2580000003 HC RX 258: Performed by: INTERNAL MEDICINE

## 2020-04-18 PROCEDURE — 84484 ASSAY OF TROPONIN QUANT: CPT

## 2020-04-18 PROCEDURE — 1200000000 HC SEMI PRIVATE

## 2020-04-18 PROCEDURE — 4500000027

## 2020-04-18 PROCEDURE — 6370000000 HC RX 637 (ALT 250 FOR IP): Performed by: INTERNAL MEDICINE

## 2020-04-18 PROCEDURE — 93005 ELECTROCARDIOGRAM TRACING: CPT | Performed by: PHYSICIAN ASSISTANT

## 2020-04-18 PROCEDURE — 86900 BLOOD TYPING SEROLOGIC ABO: CPT

## 2020-04-18 PROCEDURE — 86901 BLOOD TYPING SEROLOGIC RH(D): CPT

## 2020-04-18 PROCEDURE — 80053 COMPREHEN METABOLIC PANEL: CPT

## 2020-04-18 PROCEDURE — G0328 FECAL BLOOD SCRN IMMUNOASSAY: HCPCS

## 2020-04-18 PROCEDURE — 93010 ELECTROCARDIOGRAM REPORT: CPT | Performed by: INTERNAL MEDICINE

## 2020-04-18 PROCEDURE — 6360000002 HC RX W HCPCS: Performed by: INTERNAL MEDICINE

## 2020-04-18 PROCEDURE — 93005 ELECTROCARDIOGRAM TRACING: CPT | Performed by: EMERGENCY MEDICINE

## 2020-04-18 PROCEDURE — 85025 COMPLETE CBC W/AUTO DIFF WBC: CPT

## 2020-04-18 PROCEDURE — 99291 CRITICAL CARE FIRST HOUR: CPT

## 2020-04-18 PROCEDURE — 86922 COMPATIBILITY TEST ANTIGLOB: CPT

## 2020-04-18 PROCEDURE — 83690 ASSAY OF LIPASE: CPT

## 2020-04-18 PROCEDURE — C9113 INJ PANTOPRAZOLE SODIUM, VIA: HCPCS | Performed by: INTERNAL MEDICINE

## 2020-04-18 PROCEDURE — 36430 TRANSFUSION BLD/BLD COMPNT: CPT

## 2020-04-18 PROCEDURE — 36415 COLL VENOUS BLD VENIPUNCTURE: CPT

## 2020-04-18 PROCEDURE — 83605 ASSAY OF LACTIC ACID: CPT

## 2020-04-18 PROCEDURE — 6360000002 HC RX W HCPCS: Performed by: EMERGENCY MEDICINE

## 2020-04-18 PROCEDURE — 86850 RBC ANTIBODY SCREEN: CPT

## 2020-04-18 PROCEDURE — P9016 RBC LEUKOCYTES REDUCED: HCPCS

## 2020-04-18 RX ORDER — FUROSEMIDE 10 MG/ML
40 INJECTION INTRAMUSCULAR; INTRAVENOUS 2 TIMES DAILY
Status: DISCONTINUED | OUTPATIENT
Start: 2020-04-18 | End: 2020-04-23

## 2020-04-18 RX ORDER — PANTOPRAZOLE SODIUM 40 MG/10ML
40 INJECTION, POWDER, LYOPHILIZED, FOR SOLUTION INTRAVENOUS EVERY 12 HOURS
Status: DISCONTINUED | OUTPATIENT
Start: 2020-04-18 | End: 2020-04-23 | Stop reason: HOSPADM

## 2020-04-18 RX ORDER — PROMETHAZINE HYDROCHLORIDE 12.5 MG/1
12.5 TABLET ORAL EVERY 6 HOURS PRN
Status: DISCONTINUED | OUTPATIENT
Start: 2020-04-18 | End: 2020-04-23 | Stop reason: HOSPADM

## 2020-04-18 RX ORDER — VANCOMYCIN HYDROCHLORIDE 1 G/200ML
1000 INJECTION, SOLUTION INTRAVENOUS ONCE
Status: COMPLETED | OUTPATIENT
Start: 2020-04-18 | End: 2020-04-18

## 2020-04-18 RX ORDER — MIDODRINE HYDROCHLORIDE 5 MG/1
10 TABLET ORAL
Status: DISCONTINUED | OUTPATIENT
Start: 2020-04-18 | End: 2020-04-23 | Stop reason: HOSPADM

## 2020-04-18 RX ORDER — SODIUM CHLORIDE 9 MG/ML
INJECTION, SOLUTION INTRAVENOUS CONTINUOUS
Status: DISCONTINUED | OUTPATIENT
Start: 2020-04-18 | End: 2020-04-18

## 2020-04-18 RX ORDER — ONDANSETRON 2 MG/ML
4 INJECTION INTRAMUSCULAR; INTRAVENOUS EVERY 6 HOURS PRN
Status: DISCONTINUED | OUTPATIENT
Start: 2020-04-18 | End: 2020-04-23 | Stop reason: HOSPADM

## 2020-04-18 RX ORDER — SODIUM CHLORIDE 9 MG/ML
10 INJECTION INTRAVENOUS EVERY 12 HOURS
Status: DISCONTINUED | OUTPATIENT
Start: 2020-04-18 | End: 2020-04-23 | Stop reason: HOSPADM

## 2020-04-18 RX ORDER — FOLIC ACID 1 MG/1
2 TABLET ORAL DAILY
Status: DISCONTINUED | OUTPATIENT
Start: 2020-04-18 | End: 2020-04-23 | Stop reason: HOSPADM

## 2020-04-18 RX ORDER — PANTOPRAZOLE SODIUM 40 MG/10ML
40 INJECTION, POWDER, LYOPHILIZED, FOR SOLUTION INTRAVENOUS ONCE
Status: COMPLETED | OUTPATIENT
Start: 2020-04-18 | End: 2020-04-18

## 2020-04-18 RX ORDER — FERROUS SULFATE 325(65) MG
325 TABLET ORAL 2 TIMES DAILY WITH MEALS
Status: DISCONTINUED | OUTPATIENT
Start: 2020-04-18 | End: 2020-04-23 | Stop reason: HOSPADM

## 2020-04-18 RX ORDER — FUROSEMIDE 10 MG/ML
20 INJECTION INTRAMUSCULAR; INTRAVENOUS ONCE
Status: COMPLETED | OUTPATIENT
Start: 2020-04-18 | End: 2020-04-18

## 2020-04-18 RX ORDER — SODIUM CHLORIDE 0.9 % (FLUSH) 0.9 %
10 SYRINGE (ML) INJECTION EVERY 12 HOURS SCHEDULED
Status: DISCONTINUED | OUTPATIENT
Start: 2020-04-18 | End: 2020-04-23 | Stop reason: HOSPADM

## 2020-04-18 RX ORDER — 0.9 % SODIUM CHLORIDE 0.9 %
250 INTRAVENOUS SOLUTION INTRAVENOUS ONCE
Status: COMPLETED | OUTPATIENT
Start: 2020-04-18 | End: 2020-04-18

## 2020-04-18 RX ORDER — 0.9 % SODIUM CHLORIDE 0.9 %
500 INTRAVENOUS SOLUTION INTRAVENOUS ONCE
Status: COMPLETED | OUTPATIENT
Start: 2020-04-18 | End: 2020-04-18

## 2020-04-18 RX ORDER — PYRIDOSTIGMINE BROMIDE 60 MG/1
60 TABLET ORAL 3 TIMES DAILY
Status: DISCONTINUED | OUTPATIENT
Start: 2020-04-18 | End: 2020-04-23 | Stop reason: HOSPADM

## 2020-04-18 RX ORDER — SODIUM CHLORIDE 0.9 % (FLUSH) 0.9 %
10 SYRINGE (ML) INJECTION PRN
Status: DISCONTINUED | OUTPATIENT
Start: 2020-04-18 | End: 2020-04-23 | Stop reason: HOSPADM

## 2020-04-18 RX ORDER — ACETAMINOPHEN 325 MG/1
650 TABLET ORAL EVERY 4 HOURS PRN
Status: DISCONTINUED | OUTPATIENT
Start: 2020-04-18 | End: 2020-04-23 | Stop reason: HOSPADM

## 2020-04-18 RX ORDER — SODIUM POLYSTYRENE SULFONATE 15 G/60ML
15 SUSPENSION ORAL; RECTAL ONCE
Status: COMPLETED | OUTPATIENT
Start: 2020-04-18 | End: 2020-04-18

## 2020-04-18 RX ADMIN — SODIUM CHLORIDE 500 ML: 9 INJECTION, SOLUTION INTRAVENOUS at 04:51

## 2020-04-18 RX ADMIN — PYRIDOSTIGMINE BROMIDE 60 MG: 60 TABLET ORAL at 14:28

## 2020-04-18 RX ADMIN — FUROSEMIDE 20 MG: 10 INJECTION, SOLUTION INTRAVENOUS at 08:39

## 2020-04-18 RX ADMIN — MIDODRINE HYDROCHLORIDE 10 MG: 5 TABLET ORAL at 12:31

## 2020-04-18 RX ADMIN — SODIUM CHLORIDE 250 ML: 9 INJECTION, SOLUTION INTRAVENOUS at 17:25

## 2020-04-18 RX ADMIN — SODIUM CHLORIDE, PRESERVATIVE FREE 10 ML: 5 INJECTION INTRAVENOUS at 08:39

## 2020-04-18 RX ADMIN — PYRIDOSTIGMINE BROMIDE 60 MG: 60 TABLET ORAL at 21:42

## 2020-04-18 RX ADMIN — PYRIDOSTIGMINE BROMIDE 60 MG: 60 TABLET ORAL at 08:39

## 2020-04-18 RX ADMIN — PANTOPRAZOLE SODIUM 40 MG: 40 INJECTION, POWDER, FOR SOLUTION INTRAVENOUS at 05:52

## 2020-04-18 RX ADMIN — MIDODRINE HYDROCHLORIDE 10 MG: 5 TABLET ORAL at 08:39

## 2020-04-18 RX ADMIN — FOLIC ACID 2 MG: 1 TABLET ORAL at 08:39

## 2020-04-18 RX ADMIN — SODIUM CHLORIDE, PRESERVATIVE FREE 10 ML: 5 INJECTION INTRAVENOUS at 08:40

## 2020-04-18 RX ADMIN — MIDODRINE HYDROCHLORIDE 10 MG: 5 TABLET ORAL at 18:29

## 2020-04-18 RX ADMIN — CEFEPIME 2 G: 2 INJECTION, POWDER, FOR SOLUTION INTRAVENOUS at 04:00

## 2020-04-18 RX ADMIN — FUROSEMIDE 40 MG: 10 INJECTION, SOLUTION INTRAMUSCULAR; INTRAVENOUS at 21:44

## 2020-04-18 RX ADMIN — VANCOMYCIN HYDROCHLORIDE 1000 MG: 1 INJECTION, SOLUTION INTRAVENOUS at 05:29

## 2020-04-18 RX ADMIN — FUROSEMIDE 40 MG: 10 INJECTION, SOLUTION INTRAMUSCULAR; INTRAVENOUS at 10:49

## 2020-04-18 RX ADMIN — SODIUM POLYSTYRENE SULFONATE 15 G: 15 SUSPENSION ORAL; RECTAL at 10:49

## 2020-04-18 RX ADMIN — SODIUM CHLORIDE 200 MG: 900 INJECTION, SOLUTION INTRAVENOUS at 12:19

## 2020-04-18 RX ADMIN — PANTOPRAZOLE SODIUM 40 MG: 40 INJECTION, POWDER, FOR SOLUTION INTRAVENOUS at 21:43

## 2020-04-18 RX ADMIN — SODIUM CHLORIDE, PRESERVATIVE FREE 10 ML: 5 INJECTION INTRAVENOUS at 21:53

## 2020-04-18 ASSESSMENT — PAIN SCALES - GENERAL
PAINLEVEL_OUTOF10: 0
PAINLEVEL_OUTOF10: 0
PAINLEVEL_OUTOF10: 6

## 2020-04-18 ASSESSMENT — ENCOUNTER SYMPTOMS
DIARRHEA: 0
NAUSEA: 0
VOMITING: 0
ABDOMINAL PAIN: 0
SHORTNESS OF BREATH: 0
COUGH: 0

## 2020-04-18 ASSESSMENT — PAIN DESCRIPTION - LOCATION: LOCATION: ABDOMEN

## 2020-04-18 ASSESSMENT — PAIN DESCRIPTION - PAIN TYPE: TYPE: ACUTE PAIN

## 2020-04-18 NOTE — H&P
Foot\"    OTHER SURGICAL HISTORY Right 2013    right robotic assist radical nephrectomy    TOE AMPUTATION      little toes , bilaterally     TOE AMPUTATION Left     \"Little Toe\"   [facility]     No family history on file. Family Hx of HTN  Family Hx as reviewed above, otherwise non-contributory  Social History     Socioeconomic History    Marital status:      Spouse name: Not on file    Number of children: Not on file    Years of education: Not on file    Highest education level: Not on file   Occupational History    Not on file   Social Needs    Financial resource strain: Not on file    Food insecurity     Worry: Not on file     Inability: Not on file    Transportation needs     Medical: Not on file     Non-medical: Not on file   Tobacco Use    Smoking status: Former Smoker     Packs/day: 1.50     Years: 35.00     Pack years: 52.50     Last attempt to quit: 1977     Years since quittin.7    Smokeless tobacco: Never Used   Substance and Sexual Activity    Alcohol use: Yes     Comment: once in [place] Drug use: No    Sexual activity: Not Currently   Lifestyle    Physical activity     Days per week: Not on file     Minutes per session: Not on file    Stress: Not on file   Relationships    Social connections     Talks on phone: Not on file     Gets together: Not on file     Attends Rastafarian service: Not on file     Active member of club or organization: Not on file     Attends meetings of clubs or organizations: Not on file     Relationship status: Not on file    Intimate partner violence     Fear of current or ex partner: Not on file     Emotionally abused: Not on file     Physically abused: Not on file     Forced sexual activity: Not on file   Other Topics Concern    Not on file   Social History Narrative    Not on file       MEDICATIONS   Medications Prior to Admission  Not in a hospital admission.     Current Medications  Current chest wall tenderness. GI  -Abdomen is soft, non-distended, no significant tenderness. No masses or guarding. + BS in all quadrants. Rectal exam deferred.   -No CVA tenderness. Alvarez catheter is not present. MS  -B/L extremities - Full movements. + swelling, intact sensation symmetrical.   SKIN  -Normal coloration, warm, dry. NEURO  -CN 2-12 appear grossly intact, normal speech, no lateralizing weakness. PSYC  -Awake, alert, oriented. Appropriate affect. LABS AND IMAGING     Results for Caryn Haywood (MRN [id number]) as of 4/18/2020 06:15   Ref. Range 4/18/2020 04:00   WBC Latest Ref Range: 4.0 - 10.5 K/CU MM 5.9   RBC Latest Ref Range: 4.6 - 6.2 M/CU MM 2.95 (L)   Hemoglobin Quant Latest Ref Range: 13.5 - 18.0 GM/DL 6.6. Jose Miguel Roz Jose Miguel Roz (LL)   Hematocrit Latest Ref Range: 42 - 52 % 24.4 (L)   MCV Latest Ref Range: 78 - 100 FL 82.7   MCH Latest Ref Range: 27 - 31 PG 22.4 (L)   MCHC Latest Ref Range: 32.0 - 36.0 % 27.0 (L)   MPV Latest Ref Range: 7.5 - 11.1 FL 12.8 (H)   RDW Latest Ref Range: 11.7 - 14.9 % 17.9 (H)   Platelet Count Latest Ref Range: 140 - 440 K/CU    Lymphocyte % Latest Ref Range: 24 - 44 % 7.1 (L)   Monocytes % Latest Ref Range: 0 - 4 % 10.4 (H)   Eosinophils % Latest Ref Range: 0 - 3 % 0.2   Basophils % Latest Ref Range: 0 - 1 % 0.3   Lymphocytes Absolute Latest Units: K/CU MM 0.4   Monocytes Absolute Latest Units: K/CU MM 0.6   Eosinophils Absolute Latest Units: K/CU MM 0.0   Basophils Absolute Latest Units: K/CU MM 0.0   Differential Type Unknown AUTOMATED DIFFERENTIAL   Segs Relative Latest Ref Range: 36 - 66 % 81.7 (H)   Segs Absolute Latest Units: K/CU MM 4.8   Nucleated RBC % Latest Units: % 1.4   Immature Neutrophil % Latest Ref Range: 0 - 0.43 % 0.3   Total Immature Neutrophil Latest Units: K/CU MM 0.02   Total Nucleated RBC Latest Units: K/CU MM 0.1     Results for Caryn Haywood (MRN [id number]) as of 4/18/2020 06:15   Ref.  Range 4/18/2020 04:30   Lactic Acid, Sepsis Latest Ref of RCA and mid LAD- distal LAD fills with collaterals  Lcx 90% and OM 70% (per 3/2/19 [facility])      5. CKD stage III- BMP pending from today. 6. chronic anemia  -Iron 18, U , TIBC 311, transferrin percentage 6-3/25/2020  -Ferritin 10- 3/3/2019  -Start ferrous sulfate. 7. H/o renal cell carcinoma status post right radical nephrectomy-2013. 8.  history of hypertension, currently blood pressure on the lower side  -Patient on midodrine    9. history of colon cancer status post colectomy-2012    10. recent admission to the hospital 3/24/2024 generalized weakness and dizziness, discharged to ARU, discharged home 4/3/2020     11. COPD with no exacerbation     DVT Prophylaxis: No chemical prophylaxis due to anemia and suspected GI bleed. GI Prophylaxis: IV Protonix  Code Status: FULL.  Discussed with the patient    Case d/w ED physician    Grant Crum MD  Hospitalist, Internal Medicine  4/18/2020 at 5:01 AM

## 2020-04-18 NOTE — ED PROVIDER NOTES
Bowel sounds are normal. There is no distension. Palpations: Abdomen is soft. There is no mass. Tenderness: There is no abdominal tenderness. There is no right CVA tenderness, left CVA tenderness, guarding or rebound. Hernia: No hernia is present. Musculoskeletal: Normal range of motion. General: No swelling, tenderness, deformity or signs of injury. Right lower leg: No edema. Left lower leg: No edema. Lymphadenopathy:      Cervical: No cervical adenopathy. Skin:     Capillary Refill: Capillary refill takes 2 to 3 seconds. Coloration: Skin is pale. Skin is not jaundiced. Findings: No bruising, erythema, lesion or rash. Neurological:      General: No focal deficit present. Mental Status: He is alert and oriented to person, place, and time. Mental status is at baseline. Cranial Nerves: No cranial nerve deficit. Sensory: No sensory deficit. Motor: No weakness. Coordination: Coordination normal.      Gait: Gait normal.      Deep Tendon Reflexes: Reflexes normal.   Psychiatric:         Mood and Affect: Mood normal.         Behavior: Behavior normal.         Thought Content:  Thought content normal.         Judgment: Judgment normal.         DIAGNOSTIC RESULTS     Labs Reviewed   CBC WITH AUTO DIFFERENTIAL - Abnormal; Notable for the following components:       Result Value    RBC 2.95 (*)     Hemoglobin   (*)     Value: 6.6  HGB CALLED TO DR Stephanie Nguyen 4/18 0433 BY ANTONIO VIRAMONTES       Hematocrit 24.4 (*)     MCH 22.4 (*)     MCHC 27.0 (*)     RDW 17.9 (*)     MPV 12.8 (*)     Segs Relative 81.7 (*)     Lymphocytes % 7.1 (*)     Monocytes % 10.4 (*)     All other components within normal limits   CULTURE, BLOOD 1   CULTURE, BLOOD 2   SPECIMEN REJECTION   URINALYSIS   LACTATE, SEPSIS   LACTATE, SEPSIS   SPECIMEN REJECTION   COMPREHENSIVE METABOLIC PANEL W/ REFLEX TO MG FOR LOW K   LIPASE   BRAIN NATRIURETIC PEPTIDE   TROPONIN   OCCULT BLOOD X 3, STOOL conducting a physical exam, performing and monitoring interventions, ordering, collecting and interpreting tests, and establishing medical decision-making. There was a potential for life/limb threatening pathology requiring close evaluation and intervention with concern for patient decompensation. CONSULTS:  IP CONSULT TO HOSPITALIST  IP CONSULT TO GI    PROCEDURES:  None performed unless otherwise noted below     Procedures        FINAL IMPRESSION      1. Generalized weakness    2. Iron deficiency anemia due to chronic blood loss    3. Gastrointestinal hemorrhage with melena          DISPOSITION/PLAN   DISPOSITION Admitted 04/18/2020 05:01:17 AM      PATIENT REFERRED TO:  Susan Salas MD  [id number] [facility]  [phone]            DISCHARGE MEDICATIONS:  New Prescriptions    No medications on file       ED Provider Disposition Time  DISPOSITION Admitted 04/18/2020 05:01:17 AM      Appropriate personal protective equipment was worn during the patient's evaluation. These included surgical, eye protection, surgical mask or in 95 respirator and gloves. The patient was also placed in a surgical mask for the prevention of possible spread of respiratory viral illnesses. The Patient was instructed to read the package inserts with any medication that was prescribed. Major potential reactions and medication interactions were discussed. The Patient understands that there are numerous possible adverse reactions not covered. The patient was also instructed to arrange follow-up with his or her primary care provider for review of any pending labwork or incidental findings on any radiology results that were obtained. All efforts were made to discuss any incidental findings that require further monitoring. Controlled Substances Monitoring:     No flowsheet data found.     (Please note that portions of this note were completed with a voice recognition program.  Efforts were made to edit the dictations but occasionally words are mis-transcribed.)    Sharri Hedrick DO (electronically signed)  Attending Emergency Physician            Sharri Hedrick DO  04/18/20 [id number]

## 2020-04-18 NOTE — CARE COORDINATION
Reviewed chart and spoke with pt about discharge needs/plans. Pt lives with his 2 sons, PTA he states he was independent with ADL's and family provides transportation. Pt has a RW and cane at home. He has a PCP and insurance that covers medications. Pt has recent stay at ARU and was to discharge with [facility]. Pt gave CM permission to call daughter/POA Yeimi. Called Yeimi and she confirmed pt lives with sons and DME but she states he was having issues with ADL's and mobility. No HC has ever contacted them, reviewed ARU chart plan was for [facility] but I could not find HC order. We discussed discharge plan and she would like pt to go to ARU. Will call referral to ARU admission. If pt not able to go to ARU next choice is [facility]. We also discussed home care and Hospice. When discharged from Rehab pt would need HC or Hospice. CM will continue to follow. 4 Santhosh Brewer with [facility] and they will not be able to take pt back. Referral called (THERESA)/faxed to Jackie at [facility].

## 2020-04-18 NOTE — DISCHARGE INSTR - COC
Continuity of Care Form    Patient Name: Piyush Jeffery   :  1935  MRN:  [id number]    Admit date:  2020  Discharge date:  2020    Code Status Order: Full Code   Advance Directives:     Admitting Physician:  Lindsey Kay MD  PCP: Charity Briceno MD    Discharging Nurse: Christiano mcmanus Unit/Room#: 5953/8079-C  Discharging Unit Phone Number: [phone]    Emergency Contact:   Extended Emergency Contact Information  Primary Emergency Contact: Toni Musa  Home Phone: [phone]  Relation: Child  Secondary Emergency Contact: [address]  Mobile Phone: [phone]  Relation: Child   needed?  No    Past Surgical History:  Past Surgical History:   Procedure Laterality Date    COLECTOMY  12    \"Took 1.5 Foot Large Colon\", Colon Cancer    COLON SURGERY      2012    COLONOSCOPY      EYE SURGERY Bilateral     Cataracts With Lens Implants Eyes    FOOT SURGERY Right     \"Gangrene Right Foot, Amputated Right Little Toe, Little Bit Right Foot\"    OTHER SURGICAL HISTORY Right 2013    right robotic assist radical nephrectomy    TOE AMPUTATION      little toes , bilaterally     TOE AMPUTATION Left     \"Little Toe\"   [facility]       Immunization History:   Immunization History   Administered Date(s) Administered    Influenza, Quadv, 6 mo and older, IM, PF (Flulaval, Fluarix) 2019    Tdap (Boostrix, Adacel) 2020       Active Problems:  Patient Active Problem List   Diagnosis Code    Renal cancer ([facility] C64.9    Hypertension I10    Severe sepsis with acute organ dysfunction ([facility] A41.9, R65.20    Pneumonia due to organism J18.9    NSTEMI (non-ST elevated myocardial infarction) ([facility] I21.4    Type 2 diabetes mellitus with diabetic peripheral angiopathy and gangrene, without long-term current use of insulin (HCC) P21.00    Metabolic encephalopathy N13.87    Sepsis with within normal limits

## 2020-04-18 NOTE — CARE COORDINATION
Reviewed chart and spoke with pt about discharge needs/plans. Pt lives with his 2 sons, PTA he states he was independent with ADL's and family provides transportation. Pt has a RW and cane at home. He has a PCP and insurance that covers medications. Pt has recent stay at ARU and was to discharge with [address] [facility]. Pt gave CM permission to call daughter/POA Yeimi. Called Yeimi and she confirmed pt lives with sons and DME but she states he was having issues with ADL's and mobility. No HC has ever contacted them, reviewed ARU chart plan was for [address] but I could not find HC order. We discussed discharge plan and she would like pt to go to ARU. Will call referral to ARU admission. If pt not able to go to ARU next choice is [facility]. We also discussed home care and Hospice. When discharged from Rehab pt would need HC or Hospice. CM will continue to follow.

## 2020-04-19 ENCOUNTER — APPOINTMENT (OUTPATIENT)
Dept: ULTRASOUND IMAGING | Age: 85
DRG: 280 | End: 2020-04-19
Payer: MEDICARE

## 2020-04-19 LAB
ABO/RH: NORMAL
ANION GAP SERPL CALCULATED.3IONS-SCNC: 16 MMOL/L (ref 4–16)
ANTIBODY SCREEN: NEGATIVE
BACTERIA: ABNORMAL /HPF
BILIRUBIN URINE: NEGATIVE MG/DL
BLOOD, URINE: ABNORMAL
BUN BLDV-MCNC: 63 MG/DL (ref 6–23)
CALCIUM SERPL-MCNC: 8.7 MG/DL (ref 8.3–10.6)
CHLORIDE BLD-SCNC: 104 MMOL/L (ref 99–110)
CLARITY: ABNORMAL
CO2: 20 MMOL/L (ref 21–32)
COLOR: ABNORMAL
COMPONENT: NORMAL
CREAT SERPL-MCNC: 2.8 MG/DL (ref 0.9–1.3)
CROSSMATCH RESULT: NORMAL
EKG ATRIAL RATE: 300 BPM
EKG DIAGNOSIS: NORMAL
EKG Q-T INTERVAL: 428 MS
EKG QRS DURATION: 76 MS
EKG QTC CALCULATION (BAZETT): 448 MS
EKG R AXIS: 74 DEGREES
EKG T AXIS: 194 DEGREES
EKG VENTRICULAR RATE: 66 BPM
GFR AFRICAN AMERICAN: 26 ML/MIN/1.73M2
GFR NON-AFRICAN AMERICAN: 22 ML/MIN/1.73M2
GLUCOSE BLD-MCNC: 72 MG/DL (ref 70–99)
GLUCOSE, URINE: NEGATIVE MG/DL
HCT VFR BLD CALC: 26.4 % (ref 42–52)
HCT VFR BLD CALC: 29.1 % (ref 42–52)
HEMOGLOBIN: 7.8 GM/DL (ref 13.5–18)
HEMOGLOBIN: 8.3 GM/DL (ref 13.5–18)
HYALINE CASTS: 5 /LPF
KETONES, URINE: NEGATIVE MG/DL
LEUKOCYTE ESTERASE, URINE: ABNORMAL
MCH RBC QN AUTO: 23.7 PG (ref 27–31)
MCHC RBC AUTO-ENTMCNC: 29.5 % (ref 32–36)
MCV RBC AUTO: 80.2 FL (ref 78–100)
MUCUS: ABNORMAL HPF
NITRITE URINE, QUANTITATIVE: NEGATIVE
PDW BLD-RTO: 17.7 % (ref 11.7–14.9)
PH, URINE: 5 (ref 5–8)
PLATELET # BLD: 166 K/CU MM (ref 140–440)
PMV BLD AUTO: 12.2 FL (ref 7.5–11.1)
POTASSIUM SERPL-SCNC: 4.4 MMOL/L (ref 3.5–5.1)
PROTEIN UA: 100 MG/DL
RBC # BLD: 3.29 M/CU MM (ref 4.6–6.2)
RBC URINE: 80 /HPF (ref 0–3)
SODIUM BLD-SCNC: 140 MMOL/L (ref 135–145)
SPECIFIC GRAVITY UA: 1.01 (ref 1–1.03)
SQUAMOUS EPITHELIAL: <1 /HPF
STATUS: NORMAL
TRANSFUSION STATUS: NORMAL
TRICHOMONAS: ABNORMAL /HPF
UNIT DIVISION: 0
UNIT NUMBER: NORMAL
UROBILINOGEN, URINE: NORMAL MG/DL (ref 0.2–1)
WBC # BLD: 7 K/CU MM (ref 4–10.5)
WBC UA: 12 /HPF (ref 0–2)

## 2020-04-19 PROCEDURE — 2580000003 HC RX 258: Performed by: INTERNAL MEDICINE

## 2020-04-19 PROCEDURE — 36415 COLL VENOUS BLD VENIPUNCTURE: CPT

## 2020-04-19 PROCEDURE — 1200000000 HC SEMI PRIVATE

## 2020-04-19 PROCEDURE — 97116 GAIT TRAINING THERAPY: CPT

## 2020-04-19 PROCEDURE — 85018 HEMOGLOBIN: CPT

## 2020-04-19 PROCEDURE — 97530 THERAPEUTIC ACTIVITIES: CPT

## 2020-04-19 PROCEDURE — 6360000002 HC RX W HCPCS: Performed by: INTERNAL MEDICINE

## 2020-04-19 PROCEDURE — 99223 1ST HOSP IP/OBS HIGH 75: CPT | Performed by: INTERNAL MEDICINE

## 2020-04-19 PROCEDURE — 51702 INSERT TEMP BLADDER CATH: CPT

## 2020-04-19 PROCEDURE — 85027 COMPLETE CBC AUTOMATED: CPT

## 2020-04-19 PROCEDURE — 80048 BASIC METABOLIC PNL TOTAL CA: CPT

## 2020-04-19 PROCEDURE — 81001 URINALYSIS AUTO W/SCOPE: CPT

## 2020-04-19 PROCEDURE — 97166 OT EVAL MOD COMPLEX 45 MIN: CPT

## 2020-04-19 PROCEDURE — 97162 PT EVAL MOD COMPLEX 30 MIN: CPT

## 2020-04-19 PROCEDURE — C9113 INJ PANTOPRAZOLE SODIUM, VIA: HCPCS | Performed by: INTERNAL MEDICINE

## 2020-04-19 PROCEDURE — 93010 ELECTROCARDIOGRAM REPORT: CPT | Performed by: INTERNAL MEDICINE

## 2020-04-19 PROCEDURE — 76775 US EXAM ABDO BACK WALL LIM: CPT

## 2020-04-19 PROCEDURE — 85014 HEMATOCRIT: CPT

## 2020-04-19 PROCEDURE — 6370000000 HC RX 637 (ALT 250 FOR IP): Performed by: INTERNAL MEDICINE

## 2020-04-19 RX ORDER — SODIUM CHLORIDE, SODIUM LACTATE, POTASSIUM CHLORIDE, CALCIUM CHLORIDE 600; 310; 30; 20 MG/100ML; MG/100ML; MG/100ML; MG/100ML
INJECTION, SOLUTION INTRAVENOUS CONTINUOUS
Status: DISCONTINUED | OUTPATIENT
Start: 2020-04-19 | End: 2020-04-19

## 2020-04-19 RX ADMIN — MIDODRINE HYDROCHLORIDE 10 MG: 5 TABLET ORAL at 17:28

## 2020-04-19 RX ADMIN — SODIUM CHLORIDE, PRESERVATIVE FREE 10 ML: 5 INJECTION INTRAVENOUS at 19:33

## 2020-04-19 RX ADMIN — SODIUM CHLORIDE, PRESERVATIVE FREE 10 ML: 5 INJECTION INTRAVENOUS at 17:28

## 2020-04-19 RX ADMIN — PYRIDOSTIGMINE BROMIDE 60 MG: 60 TABLET ORAL at 08:14

## 2020-04-19 RX ADMIN — FUROSEMIDE 40 MG: 10 INJECTION, SOLUTION INTRAMUSCULAR; INTRAVENOUS at 08:14

## 2020-04-19 RX ADMIN — PANTOPRAZOLE SODIUM 40 MG: 40 INJECTION, POWDER, FOR SOLUTION INTRAVENOUS at 05:58

## 2020-04-19 RX ADMIN — SODIUM CHLORIDE, PRESERVATIVE FREE 10 ML: 5 INJECTION INTRAVENOUS at 08:14

## 2020-04-19 RX ADMIN — PYRIDOSTIGMINE BROMIDE 60 MG: 60 TABLET ORAL at 14:17

## 2020-04-19 RX ADMIN — PYRIDOSTIGMINE BROMIDE 60 MG: 60 TABLET ORAL at 19:33

## 2020-04-19 RX ADMIN — SODIUM CHLORIDE 200 MG: 900 INJECTION, SOLUTION INTRAVENOUS at 11:29

## 2020-04-19 RX ADMIN — FOLIC ACID 2 MG: 1 TABLET ORAL at 08:14

## 2020-04-19 RX ADMIN — MIDODRINE HYDROCHLORIDE 10 MG: 5 TABLET ORAL at 11:29

## 2020-04-19 RX ADMIN — FUROSEMIDE 40 MG: 10 INJECTION, SOLUTION INTRAMUSCULAR; INTRAVENOUS at 17:28

## 2020-04-19 RX ADMIN — PANTOPRAZOLE SODIUM 40 MG: 40 INJECTION, POWDER, FOR SOLUTION INTRAVENOUS at 17:28

## 2020-04-19 RX ADMIN — MIDODRINE HYDROCHLORIDE 10 MG: 5 TABLET ORAL at 08:14

## 2020-04-19 ASSESSMENT — PAIN SCALES - GENERAL
PAINLEVEL_OUTOF10: 0
PAINLEVEL_OUTOF10: 0

## 2020-04-19 NOTE — CONSULTS
[address], 1935, [id number], 4/19/2020    Discharge Recommendation: Inpatient Rehab ARU      History:  [place]:  The primary encounter diagnosis was Generalized weakness. Diagnoses of Iron deficiency anemia due to chronic blood loss and Gastrointestinal hemorrhage with melena were also pertinent to this visit. Subjective:  Patient states: \"I have only fallen once within the last year and it is the fall that brought me in here. \"  Pain: Pt denied pain this date  Communication with other providers: PT, RN  Restrictions: General Precautions, Fall Risk    Home Setup/Prior level of function:    Home Setup/Prior level of function    Occupational profile (relevant social history and personal factors):    Social/Functional History  Lives With: Son(2 sons who work full time)  Type of Home: House  Home Layout: Multi-level, Bed/Bath upstairs(6 steps with a handrail to access bedroom/bathroom)  Home Access: Stairs to enter with rails  Entrance Stairs - Number of Steps: 2  Bathroom Shower/Tub: Tub/Shower unit  Bathroom Toilet: Standard  Home Equipment: Quad cane (PRN) and RW (PRN) but states he does not use them  ADL Assistance: Independent  Homemaking Assistance: Independent  Ambulation Assistance: Independent  Transfer Assistance: Independent  Active : No  Patient's  Info: Dtr takes him to [employer] takes the bus to [employer] or other community destinations  Pt reports one fall in last 6 months stating his legs just gave out on him and that he was dizzy. This occurred right before hospitalization    Examination:  · Observation: Supine in bed upon arrival  · [facility] [place]  · [facility]  · Vitals: Stable vitals throughout session    Body Systems and functions:  · ROM: WFL   · Strength: 4/5,   · Sensation: Poor sensation in feet due to neuropathy.    · Tone: Normal  · Coordination: WFL  · Perception: WNL    Activities of Daily Living

## 2020-04-19 NOTE — CONSULTS
bowel sounds normal; no masses,   Extremities:1+ pitting ble edema  Neurologic: Mental status: alert, oriented, interactive, following commands  Psychiatric: mood and affect appropriate    CBC:   Recent Labs     04/18/20  0400 04/19/20  0136 04/19/20  0522   WBC 5.9  --  7.0   HGB 6.6  HGB CALLED TO DR Shaila Blair 4/18 0433 BY ANTONIO VIRAMONTES   * 8.3* 7.8*     --  166     BMP:    Recent Labs     04/18/20  0736 04/19/20  0522    140   K 5.8  K CALLED TO SHERI RN @ 0835 [phone] [facility]  RESULTS READ BACK  * 4.4    104   CO2 20* 20*   BUN 57* 63*   CREATININE 2.4* 2.8*   GLUCOSE 139* 72     Hepatic:   Recent Labs     04/18/20 0736   *   *   BILITOT 0.7   ALKPHOS 457*     Troponin: No results for input(s): TROPONINI in the last 72 hours. BNP: No results for input(s): BNP in the last 72 hours. Lipids: No results for input(s): CHOL, HDL in the last 72 hours. Invalid input(s): LDLCALCU  ABGs:   Lab Results   Component Value Date    PO2ART 107 11/17/2019    BKY6YQT 35.0 11/17/2019     INR: No results for input(s): INR in the last 72 hours. I/O last 3 completed shifts: In: 701.7 [P.O.:240; I.V.:20; Blood:441.7]  Out: 350 [Urine:350]      I/O this shift:  In: 250 [P.O.:240;  I.V.:10]  Out: -      -----------------------------------------------------------------      Assessment and Recommendations     Patient Active Problem List   Diagnosis    Renal cancer ([facility]    Hypertension    Severe sepsis with acute organ dysfunction (HCC)    Pneumonia due to organism    NSTEMI (non-ST elevated myocardial infarction) ([address]    Type 2 diabetes mellitus with diabetic peripheral angiopathy and gangrene, without long-term current use of insulin (HCC)    Metabolic encephalopathy    Sepsis with metabolic encephalopathy (HCC)    Impaired cognition    Gait disturbance    Systolic and diastolic CHF, acute on chronic (HCC)    Ischemic cardiomyopathy    Chronic combined systolic and diastolic

## 2020-04-19 NOTE — PROGRESS NOTES
thirsty. Ten point ROS reviewed negative, unless as noted above    Objective: Intake/Output Summary (Last 24 hours) at 4/19/2020 0904  Last data filed at 4/19/2020 0817  Gross per 24 hour   Intake 691.67 ml   Output 350 ml   Net 341.67 ml      Vitals:   Vitals:    04/19/20 0807   BP: 114/69   Pulse: 68   Resp: 16   Temp: 97.6 °F (36.4 °C)   SpO2: 99%     Physical Exam:   GEN [place] male, sitting upright in bed. RESP Breathing comfortably on room air. CARDIO/VASC S1/S2 auscultated. Regular rate without appreciable murmurs. 3-4+ bilateral pitting leg edema. GI Abdomen is soft without significant tenderness, masses, or guarding. Bowel sounds are normoactive.  - teresa catheter present draining clear urine. MSK No gross joint deformities. SKIN Normal coloration, warm, dry. NEURO normal speech, no lateralizing weakness.   PSYCH Awake, alert, oriented x3    Medications:   Medications:    sodium chloride flush  10 mL Intravenous 2 times per day    pantoprazole  40 mg Intravenous Q12H    And    sodium chloride (PF)  10 mL Intravenous P74P    folic acid  2 mg Oral Daily    midodrine  10 mg Oral TID WC    pyridostigmine  60 mg Oral TID    [Held by provider] ferrous sulfate  325 mg Oral BID WC    furosemide  40 mg Intravenous BID    iron sucrose  200 mg Intravenous Q24H      Infusions:    lactated ringers       PRN Meds: sodium chloride flush, 10 mL, PRN  acetaminophen, 650 mg, Q4H PRN  promethazine, 12.5 mg, Q6H PRN    Or  ondansetron, 4 mg, Q6H PRN        CBC   Recent Labs     04/18/20  0400 04/19/20  0136 04/19/20  0522   WBC 5.9  --  7.0   HGB 6.6  HGB CALLED TO DR GARCIA 4/18 0433 BY ANTONIO VIRAMONTES   * 8.3* 7.8*   HCT 24.4* 29.1* 26.4*     --  166      BMP   Recent Labs     04/18/20  0736 04/19/20  0522    140   K 5.8  K CALLED TO SHERI NASH @ 0869 [phone] [facility] MLT  RESULTS READ BACK  * 4.4    104   CO2 20* 20*   BUN 57* 63*   CREATININE 2.4* 2.8*       Radiology

## 2020-04-19 NOTE — PROGRESS NOTES
precautions    Vision/Hearing      WFL    Subjective  Pain Screening  Patient Currently in Pain: Denies  \"I'll do anything you want me to\"    Orientation  Orientation  Overall Orientation Status: Within Normal Limits(varying responses throughout seession suggest cognitive impairments)     Social/Functional History    Social/Functional History  Lives With: Son(2 sons who work full time)  Type of Home: House  Home Layout: Multi-level, Bed/Bath upstairs(6 steps with a handrail to access bedroom/bathroom)  Home Access: Stairs to enter with rails  Entrance Stairs - Number of Steps: 2  Bathroom Shower/Tub: Tub/Shower unit  Bathroom Toilet: Standard  Home Equipment: Quad cane (PRN) and RW (PRN) but states he does not use them  ADL Assistance: Independent  Homemaking Assistance: Independent  Ambulation Assistance: Independent  Transfer Assistance: Independent  Active : No  Patient's  Info: Dtr takes him to [employer]; takes the bus to [employer] or other community destinations  Pt reports one fall in last 6 months stating his legs just gave out on him and that he was dizzy. Objective    AROM RLE (degrees)  RLE AROM: WFL  AROM LLE (degrees)  LLE AROM : WFL  Strength RLE  Strength RLE: WNL  Strength LLE  Strength LLE: WNL     Sensation  Overall Sensation Status: Impaired(neuropathy bialteral LEs)  Bed mobility  Rolling to Right: Contact guard assistance  Supine to Sit: Minimal assistance  Transfers  Sit to Stand: Contact guard assistance  Stand to sit: Contact guard assistance(cues for sequencing and hand placement)  Ambulation  Ambulation?: Yes  Ambulation 1  Surface: level tile  Device: Rolling Walker  Assistance: Contact guard assistance  Gait Deviations: Slow Janna;Decreased step length;Decreased step height  Distance: 25'  Comments: Pt ambualted with RW with safe technique.  Forward flexed posture noted; distance limited due to fatigue     Balance  Posture: Fair  Sitting - Static: Good  Sitting - Dynamic: Good  Standing - Static: Fair  Standing - Dynamic: Fair(Pt stood while performing dynamic UE exercises ~5 minutes with one LOB requiring mod A to recover)        Plan   Plan  Times per week: 3+  Current Treatment Recommendations: Strengthening, Functional Mobility Training, Home Exercise Program, Equipment Evaluation, Education, & procurement, Transfer Training, Gait Training, Safety Education & Training, Balance Training, Endurance Training, Stair training, Patient/Caregiver Education & Training  Safety Devices  Type of devices:  All fall risk precautions in place, Gait belt, Left in chair, Call light within reach, Chair alarm in place    AM-PAC Score  AM-PAC Inpatient Mobility Raw Score : 17 (04/19/20 0933)  AM-PAC Inpatient T-Scale Score : 42.13 (04/19/20 0933)  Mobility Inpatient CMS 0-100% Score: 50.57 (04/19/20 0933)  Mobility Inpatient CMS G-Code Modifier : CK (04/19/20 0933)          Goals  Short term goals  Time Frame for Short term goals: 1 week  Short term goal 1: Pt to complete all bed mobility mod I  Short term goal 2: Pt to ascend/descend 1 stair with LRAD with supervision  Short term goal 3: Pt to ambulate 150' LRAD with supervision       Therapy Time   Individual Concurrent Group Co-treatment   Time In 0830         Time Out 0850         Minutes 20         Timed Code Treatment Minutes: 10 Minutes       Devan Becerra PT

## 2020-04-19 NOTE — PROGRESS NOTES
Noticed when changing patient that patients coccyx was red. Patient stated that his coccyx area was alittle sore. I placed a mepalix on bony prominence and floated the area with two pillows. Patient is able to turn and rotate on his own.   Did educate patient on rotating hip to keep his bottom area from getting sores

## 2020-04-19 NOTE — CONSULTS
CARDIOLOGY CONSULT NOTE   Reason for consultation:  Abnormal troponin/ chf    Referring physician:  Kinza Ackerman MD     Primary care physician: Frankie Gomez MD      Dear  Dr. Kinza Ackerman MD   Thanks for the consult. Chief Complaints :  Chief Complaint   Patient presents with    Fatigue    Extremity Weakness        History of present illness:Christian is a 81 y. o.year old who presents with complaints of feeling dizzy lightheaded fatigued and short of breath on minimal exertion. Patient is well-known to me from outpatient service he had cardiac catheterization in 2019 March showing chronic total occlusion of the LAD and RCA both vessels were filled by collaterals. Circumflex also had 90% disease. He was considered not a candidate for CABG. We decided to proceed with medical management he does have ischemic cardiomyopathy. Further complicating issues has been his continued cognitive decline. He comes in now because he was dizzy lightheaded fatigued he was actually admitted for prolonged period of time in December and January underwent rehab. His 2 sons take care of all his meds he needs assistance. However he is independent for his ADLs. No obvious bleeding but his hematocrit has been low renal function is getting worse he could not lie flat he was noted to be dizzy when he stood up. He does have history of orthostasis ischemic cardiomyopathy with fraction of 35%. He has history of significant alcohol abuse although daughter does not think that he is drinking much now. As outpatient he was being treated as palliative. I did call his daughter [employer] who is his POA: See my note below  \"I had a lengthy conversation with Elizabet . We we had extensive discussion Al Evaristo is known to me from outpatient service. He has history of ischemic cardiomyopathy he is not a candidate for CABG and LAD and RCA would require  intervention which are prolonged difficult cases.   Furthermore complication is positive for weight loss is has very poor appetite according to daughter he has not been able to eat or drink much  · Eyes: No Decreased Vision  · ENT: No Headaches, Hearing Loss or Vertigo  · Cardiovascular: As per HPI  · Respiratory: As per HPI  · Gastrointestinal: Poor p.o. intake no abdominal pain, appetite loss, blood in stools, constipation, diarrhea or heartburn  · Genitourinary: Difficulty does have urine issues  · Musculoskeletal:  No gait disturbance, weakness or joint complaints  · Integumentary: No rash or pruritis  · Neurological: No TIA or stroke symptoms  · Psychiatric: Continued cognitive decline memory deficits  · Endocrine: No malaise, fatigue or temperature intolerance  · Hematologic/Lymphatic: No bleeding problems, blood clots or swollen lymph nodes  · Allergic/Immunologic: No nasal congestion or hives  All systems negative except as marked. Physical Examination:    Vitals:    04/18/20 2129 04/19/20 0541 04/19/20 0807 04/19/20 1026   BP: 113/73 (!) 99/41 114/69 (!) 83/44   Pulse: 77 61 68 64   Resp: 16 16 16 16   Temp: 98 °F (36.7 °C) 97.5 °F (36.4 °C) 97.6 °F (36.4 °C) 98.4 °F (36.9 °C)   TempSrc: Oral Oral Oral Oral   SpO2: 97% 99% 99% 93%   Weight:       Height:           General Appearance:  No distress, conversant    Constitutional:  Well developed, Well nourished, No acute distress, Non-toxic appearance. HENT:  Normocephalic, Atraumatic, Bilateral external ears normal, Oropharynx moist, No oral exudates, Nose normal. Neck- Normal range of motion, No tenderness, Supple, No stridor,no apical-carotid delay  Lymphatics : no palpable lymph nodes  Eyes:  PERRL, EOMI, Conjunctiva normal, No discharge. Respiratory:  Normal breath sounds, No respiratory distress, No wheezing, No chest tenderness. ,no use of accessory muscles, crackles Absent   Cardiovascular: (PMI) apex non displaced,no lifts no thrills, ankle swelling Present , 1+, s1 and s2 audible,Murmur. Present, JVD not noted extra-axial fluid collections. Periventricular and deep white matter hypodensities are indeterminate although suggest chronic small vessel ischemic disease. ORBITS: The visualized portion of the orbits demonstrate no acute abnormality. SINUSES: The visualized paranasal sinuses and mastoid air cells demonstrate no acute abnormality. SOFT TISSUES/SKULL:  No acute abnormality of the visualized skull or soft tissues. No acute intracranial abnormality. Xr Chest Portable    Result Date: 4/18/2020  EXAMINATION: ONE XRAY VIEW OF THE CHEST 4/18/2020 3:09 am COMPARISON: Chest portable March 24, 2020. HISTORY: ORDERING SYSTEM PROVIDED HISTORY: SOB TECHNOLOGIST PROVIDED HISTORY: Reason for exam:->SOB Reason for Exam: SOB Acuity: Unknown FINDINGS: The heart is normal in size and configuration. The mediastinal contours are within normal limits. Loculated fluid is seen within the medial right minor fissure. Bilateral mild-to-moderate layering pleural effusions are present with associated bibasilar compressive atelectasis. Lungs are otherwise well aerated. Bones and soft tissues are unremarkable. Mild-to-moderate bilateral layering pleural effusions, as discussed above, not visualized on prior examination. Xr Chest Portable    Result Date: 4/4/2020  EXAMINATION: ONE XRAY VIEW OF THE CHEST 3/24/2020 5:35 pm COMPARISON: November 14, 2019 HISTORY: ORDERING SYSTEM PROVIDED HISTORY: generalized weakness TECHNOLOGIST PROVIDED HISTORY: Reason for exam:->generalized weakness Reason for Exam: chest pain Acuity: Acute Type of Exam: Initial Additional signs and symptoms: na Relevant Medical/Surgical History: diabetes, hypertension, colon cancer, FINDINGS: Lungs are hyperinflated. No consolidation, effusion or pneumothorax. Stable cardiomediastinal silhouette. No acute process.      Vl Dup Carotid Bilateral    Result Date: 3/25/2020  EXAMINATION: ULTRASOUND EVALUATION OF THE CAROTID ARTERIES 3/25/2020 COMPARISON: None HISTORY: ORDERING SYSTEM PROVIDED HISTORY: dizziness, falls TECHNOLOGIST PROVIDED HISTORY: Reason for exam:->dizziness, falls Reason for Exam: dizziness, generalized weakness, multiple falls Acuity: Acute Type of Exam: Initial FINDINGS: RIGHT: The right common carotid artery demonstrates peak systolic velocities of 82, 63 cm/sec in the proximal and distal segments respectively. The right internal carotid artery demonstrates the systolic velocities of 79, 120, 102 cm/sec in the proximal, mid and distal segments respectively. The external carotid artery is patent. The vertebral artery demonstrates normal antegrade flow. Extensive atherosclerotic plaque is seen in the distal common carotid artery extending into the internal and external carotid arteries. ICA/CCA ratio of 1.5. LEFT: The left common carotid artery demonstrates peak systolic velocities of 638, 65 cm/sec in the proximal and distal segments respectively. The left internal carotid artery demonstrates the systolic velocities of 74, 167, 147 cm/sec in the proximal, mid and distal segments respectively. The external carotid artery is patent. The vertebral artery demonstrates normal antegrade flow. Extensive atherosclerotic plaque is seen in the distal common carotid artery extending into the carotid bifurcation and proximal internal and external carotid arteries. ICA/CCA ratio of 1.6. The right internal carotid artery demonstrates 0-50% stenosis. The left internal carotid artery demonstrates 50-69% stenosis. Bilateral vertebral arteries are patent with flow in the normal direction. All labs, medications and tests reviewed by myself including data  from outside source , patient and available family . Continue all other medications of all above medical condition listed as is.      Impression:  Active Problems:    NSTEMI (non-ST elevated myocardial infarction) (HCC)    Impaired cognition    Gait disturbance    Ischemic cardiomyopathy    ASCVD

## 2020-04-20 ENCOUNTER — APPOINTMENT (OUTPATIENT)
Dept: GENERAL RADIOLOGY | Age: 85
DRG: 280 | End: 2020-04-20
Payer: MEDICARE

## 2020-04-20 ENCOUNTER — APPOINTMENT (OUTPATIENT)
Dept: INTERVENTIONAL RADIOLOGY/VASCULAR | Age: 85
DRG: 280 | End: 2020-04-20
Payer: MEDICARE

## 2020-04-20 ENCOUNTER — APPOINTMENT (OUTPATIENT)
Dept: ULTRASOUND IMAGING | Age: 85
DRG: 280 | End: 2020-04-20
Payer: MEDICARE

## 2020-04-20 LAB
ALBUMIN SERPL-MCNC: 3.3 GM/DL (ref 3.4–5)
ALP BLD-CCNC: 460 IU/L (ref 40–128)
ALT SERPL-CCNC: 266 U/L (ref 10–40)
AMYLASE: 81 U/L (ref 25–115)
ANION GAP SERPL CALCULATED.3IONS-SCNC: 16 MMOL/L (ref 4–16)
APTT: 52.1 SECONDS (ref 25.1–37.1)
AST SERPL-CCNC: 115 IU/L (ref 15–37)
BASOPHILS ABSOLUTE: 0 K/CU MM
BASOPHILS RELATIVE PERCENT: 0.5 % (ref 0–1)
BILIRUB SERPL-MCNC: 0.8 MG/DL (ref 0–1)
BUN BLDV-MCNC: 62 MG/DL (ref 6–23)
CALCIUM SERPL-MCNC: 8.3 MG/DL (ref 8.3–10.6)
CHLORIDE BLD-SCNC: 101 MMOL/L (ref 99–110)
CO2: 19 MMOL/L (ref 21–32)
CREAT SERPL-MCNC: 2.7 MG/DL (ref 0.9–1.3)
DIFFERENTIAL TYPE: ABNORMAL
EOSINOPHILS ABSOLUTE: 0.1 K/CU MM
EOSINOPHILS RELATIVE PERCENT: 1.3 % (ref 0–3)
GFR AFRICAN AMERICAN: 27 ML/MIN/1.73M2
GFR NON-AFRICAN AMERICAN: 23 ML/MIN/1.73M2
GLUCOSE BLD-MCNC: 76 MG/DL (ref 70–99)
HCT VFR BLD CALC: 27.7 % (ref 42–52)
HEMOGLOBIN: 7.8 GM/DL (ref 13.5–18)
IMMATURE NEUTROPHIL %: 0.6 % (ref 0–0.43)
INR BLD: 1.25 INDEX
LIPASE: 93 IU/L (ref 13–60)
LV EF: 33 %
LVEF MODALITY: NORMAL
LYMPHOCYTES ABSOLUTE: 0.6 K/CU MM
LYMPHOCYTES RELATIVE PERCENT: 9.7 % (ref 24–44)
MCH RBC QN AUTO: 23.5 PG (ref 27–31)
MCHC RBC AUTO-ENTMCNC: 28.2 % (ref 32–36)
MCV RBC AUTO: 83.4 FL (ref 78–100)
MONOCYTES ABSOLUTE: 0.7 K/CU MM
MONOCYTES RELATIVE PERCENT: 11.9 % (ref 0–4)
NUCLEATED RBC %: 2.6 %
PDW BLD-RTO: 18.4 % (ref 11.7–14.9)
PLATELET # BLD: 142 K/CU MM (ref 140–440)
PMV BLD AUTO: 12.2 FL (ref 7.5–11.1)
POTASSIUM SERPL-SCNC: 3.5 MMOL/L (ref 3.5–5.1)
PROTHROMBIN TIME: 15.2 SECONDS (ref 11.7–14.5)
RBC # BLD: 3.32 M/CU MM (ref 4.6–6.2)
SEGMENTED NEUTROPHILS ABSOLUTE COUNT: 4.7 K/CU MM
SEGMENTED NEUTROPHILS RELATIVE PERCENT: 76 % (ref 36–66)
SODIUM BLD-SCNC: 136 MMOL/L (ref 135–145)
TOTAL IMMATURE NEUTOROPHIL: 0.04 K/CU MM
TOTAL NUCLEATED RBC: 0.2 K/CU MM
TOTAL PROTEIN: 5.1 GM/DL (ref 6.4–8.2)
WBC # BLD: 6.2 K/CU MM (ref 4–10.5)

## 2020-04-20 PROCEDURE — 2580000003 HC RX 258: Performed by: INTERNAL MEDICINE

## 2020-04-20 PROCEDURE — 71045 X-RAY EXAM CHEST 1 VIEW: CPT

## 2020-04-20 PROCEDURE — 32555 ASPIRATE PLEURA W/ IMAGING: CPT

## 2020-04-20 PROCEDURE — 85730 THROMBOPLASTIN TIME PARTIAL: CPT

## 2020-04-20 PROCEDURE — 85027 COMPLETE CBC AUTOMATED: CPT

## 2020-04-20 PROCEDURE — 82150 ASSAY OF AMYLASE: CPT

## 2020-04-20 PROCEDURE — 85025 COMPLETE CBC W/AUTO DIFF WBC: CPT

## 2020-04-20 PROCEDURE — 6370000000 HC RX 637 (ALT 250 FOR IP): Performed by: INTERNAL MEDICINE

## 2020-04-20 PROCEDURE — 2709999900 HC NON-CHARGEABLE SUPPLY

## 2020-04-20 PROCEDURE — 99222 1ST HOSP IP/OBS MODERATE 55: CPT | Performed by: OBSTETRICS & GYNECOLOGY

## 2020-04-20 PROCEDURE — 80048 BASIC METABOLIC PNL TOTAL CA: CPT

## 2020-04-20 PROCEDURE — 76705 ECHO EXAM OF ABDOMEN: CPT

## 2020-04-20 PROCEDURE — 1200000000 HC SEMI PRIVATE

## 2020-04-20 PROCEDURE — 83690 ASSAY OF LIPASE: CPT

## 2020-04-20 PROCEDURE — 6360000002 HC RX W HCPCS: Performed by: INTERNAL MEDICINE

## 2020-04-20 PROCEDURE — 0W993ZZ DRAINAGE OF RIGHT PLEURAL CAVITY, PERCUTANEOUS APPROACH: ICD-10-PCS | Performed by: RADIOLOGY

## 2020-04-20 PROCEDURE — 80053 COMPREHEN METABOLIC PANEL: CPT

## 2020-04-20 PROCEDURE — 85610 PROTHROMBIN TIME: CPT

## 2020-04-20 PROCEDURE — 94761 N-INVAS EAR/PLS OXIMETRY MLT: CPT

## 2020-04-20 PROCEDURE — 92610 EVALUATE SWALLOWING FUNCTION: CPT | Performed by: SPEECH-LANGUAGE PATHOLOGIST

## 2020-04-20 PROCEDURE — 93306 TTE W/DOPPLER COMPLETE: CPT

## 2020-04-20 PROCEDURE — C1729 CATH, DRAINAGE: HCPCS

## 2020-04-20 PROCEDURE — C9113 INJ PANTOPRAZOLE SODIUM, VIA: HCPCS | Performed by: INTERNAL MEDICINE

## 2020-04-20 PROCEDURE — 36415 COLL VENOUS BLD VENIPUNCTURE: CPT

## 2020-04-20 RX ADMIN — SODIUM CHLORIDE, PRESERVATIVE FREE 10 ML: 5 INJECTION INTRAVENOUS at 20:53

## 2020-04-20 RX ADMIN — SODIUM CHLORIDE 200 MG: 900 INJECTION, SOLUTION INTRAVENOUS at 09:44

## 2020-04-20 RX ADMIN — PYRIDOSTIGMINE BROMIDE 60 MG: 60 TABLET ORAL at 09:43

## 2020-04-20 RX ADMIN — PYRIDOSTIGMINE BROMIDE 60 MG: 60 TABLET ORAL at 15:27

## 2020-04-20 RX ADMIN — FUROSEMIDE 40 MG: 10 INJECTION, SOLUTION INTRAMUSCULAR; INTRAVENOUS at 09:43

## 2020-04-20 RX ADMIN — PYRIDOSTIGMINE BROMIDE 60 MG: 60 TABLET ORAL at 20:53

## 2020-04-20 RX ADMIN — MIDODRINE HYDROCHLORIDE 10 MG: 5 TABLET ORAL at 18:36

## 2020-04-20 RX ADMIN — SODIUM CHLORIDE, PRESERVATIVE FREE 10 ML: 5 INJECTION INTRAVENOUS at 09:45

## 2020-04-20 RX ADMIN — FOLIC ACID 2 MG: 1 TABLET ORAL at 09:43

## 2020-04-20 RX ADMIN — MIDODRINE HYDROCHLORIDE 10 MG: 5 TABLET ORAL at 12:34

## 2020-04-20 RX ADMIN — SODIUM CHLORIDE, PRESERVATIVE FREE 10 ML: 5 INJECTION INTRAVENOUS at 09:43

## 2020-04-20 RX ADMIN — PANTOPRAZOLE SODIUM 40 MG: 40 INJECTION, POWDER, FOR SOLUTION INTRAVENOUS at 18:36

## 2020-04-20 RX ADMIN — MIDODRINE HYDROCHLORIDE 10 MG: 5 TABLET ORAL at 09:53

## 2020-04-20 RX ADMIN — SODIUM CHLORIDE, PRESERVATIVE FREE 10 ML: 5 INJECTION INTRAVENOUS at 18:53

## 2020-04-20 RX ADMIN — PANTOPRAZOLE SODIUM 40 MG: 40 INJECTION, POWDER, FOR SOLUTION INTRAVENOUS at 09:43

## 2020-04-20 RX ADMIN — FUROSEMIDE 40 MG: 10 INJECTION, SOLUTION INTRAMUSCULAR; INTRAVENOUS at 18:36

## 2020-04-20 ASSESSMENT — PAIN SCALES - GENERAL
PAINLEVEL_OUTOF10: 0

## 2020-04-20 NOTE — PROGRESS NOTES
agree with results and recommendations: Patient;RN    Education  Patient Education: results WNL  Patient Education Response: Demonstrated understanding  Safety Devices in place: Yes  Type of devices: All fall risk precautions in place; Left in bed       Therapy Time  SLP Individual Minutes  Time In: 1010  Time Out: 5137  Minutes: Kasandra [address] [place] [address]  4/20/2020 10:38 AM

## 2020-04-20 NOTE — PROGRESS NOTES
High     History of Present Illness:   Seen and examined. He feels well. Denies dizziness. Hemoglobin is stable. Cr is about same. Ten point ROS reviewed negative, unless as noted above    Objective: Intake/Output Summary (Last 24 hours) at 4/20/2020 0837  Last data filed at 4/20/2020 0552  Gross per 24 hour   Intake 370 ml   Output 1700 ml   Net -1330 ml      Vitals:   Vitals:    04/20/20 0552   BP: (!) 99/46   Pulse: 64   Resp: 16   Temp: 97.5 °F (36.4 °C)   SpO2: 97%     Physical Exam:   GEN [place] male, sitting upright in bed. RESP Breathing comfortably on room air. CARDIO/VASC S1/S2 auscultated. Regular rate without appreciable murmurs. 2+ bilateral pitting leg edema. GI Abdomen is soft without significant tenderness, masses, or guarding. Bowel sounds are normoactive.  - teresa catheter present draining clear urine. MSK No gross joint deformities. SKIN Normal coloration, warm, dry. NEURO normal speech, no lateralizing weakness.   PSYCH Awake, alert, oriented x3    Medications:   Medications:    sodium chloride flush  10 mL Intravenous 2 times per day    pantoprazole  40 mg Intravenous Q12H    And    sodium chloride (PF)  10 mL Intravenous X69F    folic acid  2 mg Oral Daily    midodrine  10 mg Oral TID WC    pyridostigmine  60 mg Oral TID    [Held by provider] ferrous sulfate  325 mg Oral BID WC    furosemide  40 mg Intravenous BID    iron sucrose  200 mg Intravenous Q24H      Infusions:     PRN Meds: sodium chloride flush, 10 mL, PRN  acetaminophen, 650 mg, Q4H PRN  promethazine, 12.5 mg, Q6H PRN    Or  ondansetron, 4 mg, Q6H PRN        CBC   Recent Labs     04/18/20  0400 04/19/20  0136 04/19/20  0522 04/20/20  0416   WBC 5.9  --  7.0 6.2   HGB 6.6  HGB CALLED TO DR GARCAI 4/18 0433 BY ANTONIO VIRAMONTES   * 8.3* 7.8* 7.8*   HCT 24.4* 29.1* 26.4* 27.7*     --  166 142      BMP   Recent Labs     04/18/20  0736 04/19/20  0522 04/20/20  0416    140 136   K 5.8  K

## 2020-04-20 NOTE — CONSULTS
heart catheterization 03/02/2019    Severe multivessel disease.     HX OTHER MEDICAL     Primary Care Physician Is Dr. Nickie Munoz    Hypertension     Malignant neoplasm of kidney [facility]) Right    MI (myocardial infarction) ([facility] [address] 03/02/2019    NSTEMI    Nerve damage     \"Both Feet\"    Renal mass 2012    right    UTI (urinary tract infection)     Last One 2-13    Wears glasses     \"Just To Read\"       Past Surgical History:        Procedure Laterality Date    COLECTOMY  7-24-12    \"Took 1.5 Foot Large Colon\", Colon Cancer    COLON SURGERY      april 2012    COLONOSCOPY  2012    EYE SURGERY Bilateral 2000's    Cataracts With Lens Implants Eyes    FOOT SURGERY Right 1998    \"Gangrene Right Foot, Amputated Right Little Toe, Little Bit Right Foot\"    OTHER SURGICAL HISTORY Right 03 18 2013    right robotic assist radical nephrectomy    TOE AMPUTATION      little toes , bilaterally     TOE AMPUTATION Left 2002    \"Little Toe\"    VASECTOMY      VASECTOMY  1971       Current Medications:    Current Facility-Administered Medications: sodium chloride flush 0.9 % injection 10 mL, 10 mL, Intravenous, 2 times per day  sodium chloride flush 0.9 % injection 10 mL, 10 mL, Intravenous, PRN  acetaminophen (TYLENOL) tablet 650 mg, 650 mg, Oral, Q4H PRN  promethazine (PHENERGAN) tablet 12.5 mg, 12.5 mg, Oral, Q6H PRN **OR** ondansetron (ZOFRAN) injection 4 mg, 4 mg, Intravenous, Q6H PRN  pantoprazole (PROTONIX) injection 40 mg, 40 mg, Intravenous, Q12H **AND** sodium chloride (PF) 0.9 % injection 10 mL, 10 mL, Intravenous, F16V  folic acid (FOLVITE) tablet 2 mg, 2 mg, Oral, Daily  midodrine (PROAMATINE) tablet 10 mg, 10 mg, Oral, TID WC  pyridostigmine (MESTINON) tablet 60 mg, 60 mg, Oral, TID  [Held by provider] ferrous sulfate (IRON 325) tablet 325 mg, 325 mg, Oral, BID WC  furosemide (LASIX) injection 40 mg, 40 mg, Intravenous, BID  iron sucrose (VENOFER) 200 mg in sodium chloride 0.9 % 100 mL IVPB, 200 mg, Intravenous, Q24H    Allergies:  Patient has no known allergies. Social History:      Was living with his twin sons prior to going into the ARU for rehabilitation. He will be going to [place] at discharge. He has three children and is . Family History:   No family history on file.     REVIEW OF SYSTEMS:    CONSTITUTIONAL:  positive for  fatigue  EYES:  negative for  visual disturbance  HEENT:  negative for  nasal congestion and sore throat  RESPIRATORY:  negative for  dyspnea  CARDIOVASCULAR:  negative for  chest pain, dyspnea  GASTROINTESTINAL:  negative for nausea, vomiting and change in bowel habits  GENITOURINARY:  Patient with teresa catheter  MUSCULOSKELETAL:  positive for  muscle weakness and edema  NEUROLOGICAL:  negative for headaches and pain  BEHAVIOR/PSYCH:  negative for depressed mood, agitated and anxiety    Vitals:    Vitals:    04/20/20 0552   BP: (!) 99/46   Pulse: 64   Resp: 16   Temp: 97.5 °F (36.4 °C)   SpO2: 97%       PHYSICAL EXAM:    GENERAL: sitting up in bed in NAD  HEENT: No cervical adenopathy, MM moist, PERRL, poor dentition   COR: RRR  LUNGS: CTA  ABD: soft, ND/NT, +BS  SKIN: scattered ecchymosis, +2 pitting edema to LE's  PSYCH: Did not appear confused, adequate judgement an insight  NEURO: CN II-XII grossly intact    DATA:    CBC:   Lab Results   Component Value Date    WBC 6.2 04/20/2020    RBC 3.32 04/20/2020    HGB 7.8 04/20/2020    HCT 27.7 04/20/2020    MCV 83.4 04/20/2020    MCH 23.5 04/20/2020    MCHC 28.2 04/20/2020    RDW 18.4 04/20/2020     04/20/2020    MPV 12.2 04/20/2020     CMP:    Lab Results   Component Value Date     04/20/2020    K 3.5 04/20/2020    K 4.6 03/14/2018     04/20/2020    CO2 19 04/20/2020    BUN 62 04/20/2020    CREATININE 2.7 04/20/2020    GFRAA 27 04/20/2020    LABGLOM 23 04/20/2020    GLUCOSE 76 04/20/2020    PROT 5.1 04/20/2020    PROT 6.6 01/22/2013    LABALBU 3.3 04/20/2020    CALCIUM 8.3 04/20/2020    BILITOT 0.8 04/20/2020    ALKPHOS 460 04/20/2020     04/20/2020     04/20/2020     Albumin:    Lab Results   Component Value Date    LABALBU 3.3 04/20/2020     ECHO: 11/19/2019  Summary   Left ventricular systolic function is abnormal.   Ejection fraction is visually estimated at 30 - 35%. [place] is akinetic; no evidence of apical thrombus. Severe mitral regurgitation. Moderate to severe tricuspid regurgitation; RVSP is 59 mmHg. No evidence of any pericardial effusion. Pleural effusion noted. IMPRESSION:    81year old male with significant cardiac issues that are being treated medically as well as other co-morbidities for Palliative Care encounter. I spoke at length with the patient regarding his goals. He wants to be able to go home and live with his twin sons. He stated \"I'm to young to die. It it is possible to stay alive then I want to do that. \"  The patient wanted to remain a full code. I could not find any advance directives but it appears that his daughter is his POA. The patient was able to understand that his heart would probably not withstand any surgery but could not understand the full extent of what that meant long term. When asked about his fears he stated \"I have none. \"    We will continue to follow for goals of care and support.         Electronically signed by Airam Leo MD on 4/20/2020 at 10:10 AM

## 2020-04-21 LAB
HCT VFR BLD CALC: 26 % (ref 42–52)
HEMOGLOBIN: 7.8 GM/DL (ref 13.5–18)
MCH RBC QN AUTO: 23.7 PG (ref 27–31)
MCHC RBC AUTO-ENTMCNC: 30 % (ref 32–36)
MCV RBC AUTO: 79 FL (ref 78–100)
PDW BLD-RTO: 18.4 % (ref 11.7–14.9)
PLATELET # BLD: 147 K/CU MM (ref 140–440)
PMV BLD AUTO: 12 FL (ref 7.5–11.1)
RBC # BLD: 3.29 M/CU MM (ref 4.6–6.2)
WBC # BLD: 5.3 K/CU MM (ref 4–10.5)

## 2020-04-21 PROCEDURE — 2700000000 HC OXYGEN THERAPY PER DAY

## 2020-04-21 PROCEDURE — 97530 THERAPEUTIC ACTIVITIES: CPT

## 2020-04-21 PROCEDURE — 6360000002 HC RX W HCPCS: Performed by: INTERNAL MEDICINE

## 2020-04-21 PROCEDURE — 6370000000 HC RX 637 (ALT 250 FOR IP): Performed by: INTERNAL MEDICINE

## 2020-04-21 PROCEDURE — 1200000000 HC SEMI PRIVATE

## 2020-04-21 PROCEDURE — 85027 COMPLETE CBC AUTOMATED: CPT

## 2020-04-21 PROCEDURE — 2580000003 HC RX 258: Performed by: INTERNAL MEDICINE

## 2020-04-21 PROCEDURE — 99232 SBSQ HOSP IP/OBS MODERATE 35: CPT | Performed by: NURSE PRACTITIONER

## 2020-04-21 PROCEDURE — C9113 INJ PANTOPRAZOLE SODIUM, VIA: HCPCS | Performed by: INTERNAL MEDICINE

## 2020-04-21 PROCEDURE — 94761 N-INVAS EAR/PLS OXIMETRY MLT: CPT

## 2020-04-21 RX ADMIN — SODIUM CHLORIDE, PRESERVATIVE FREE 10 ML: 5 INJECTION INTRAVENOUS at 10:02

## 2020-04-21 RX ADMIN — SODIUM CHLORIDE, PRESERVATIVE FREE 10 ML: 5 INJECTION INTRAVENOUS at 06:02

## 2020-04-21 RX ADMIN — FOLIC ACID 2 MG: 1 TABLET ORAL at 09:43

## 2020-04-21 RX ADMIN — PANTOPRAZOLE SODIUM 40 MG: 40 INJECTION, POWDER, FOR SOLUTION INTRAVENOUS at 06:02

## 2020-04-21 RX ADMIN — SODIUM CHLORIDE, PRESERVATIVE FREE 10 ML: 5 INJECTION INTRAVENOUS at 20:09

## 2020-04-21 RX ADMIN — SODIUM CHLORIDE 200 MG: 900 INJECTION, SOLUTION INTRAVENOUS at 10:53

## 2020-04-21 RX ADMIN — PYRIDOSTIGMINE BROMIDE 60 MG: 60 TABLET ORAL at 13:35

## 2020-04-21 RX ADMIN — MIDODRINE HYDROCHLORIDE 10 MG: 5 TABLET ORAL at 09:42

## 2020-04-21 RX ADMIN — MIDODRINE HYDROCHLORIDE 10 MG: 5 TABLET ORAL at 17:44

## 2020-04-21 RX ADMIN — MIDODRINE HYDROCHLORIDE 10 MG: 5 TABLET ORAL at 12:35

## 2020-04-21 RX ADMIN — FUROSEMIDE 40 MG: 10 INJECTION, SOLUTION INTRAMUSCULAR; INTRAVENOUS at 18:10

## 2020-04-21 RX ADMIN — PYRIDOSTIGMINE BROMIDE 60 MG: 60 TABLET ORAL at 20:08

## 2020-04-21 RX ADMIN — FUROSEMIDE 40 MG: 10 INJECTION, SOLUTION INTRAMUSCULAR; INTRAVENOUS at 09:42

## 2020-04-21 RX ADMIN — PYRIDOSTIGMINE BROMIDE 60 MG: 60 TABLET ORAL at 09:42

## 2020-04-21 RX ADMIN — PANTOPRAZOLE SODIUM 40 MG: 40 INJECTION, POWDER, FOR SOLUTION INTRAVENOUS at 20:08

## 2020-04-21 ASSESSMENT — PAIN SCALES - GENERAL
PAINLEVEL_OUTOF10: 0

## 2020-04-21 NOTE — PROGRESS NOTES
Al[address] JAMES/L  4/21/2020, 11:38 AM    Previously filed values:    Goals:  1. Pt will complete all aspects of bed mobility for EOB/OOB ADLs with CGA. 2. Pt will complete UB/LB bathing with CGA. 3. Pt will complete all aspects of LB dressing with CGA. 4. Pt will complete all functional transfers to and from bed, chair, toilet, shower chair with SBA and RW.   5. Pt will ambulate HH distance to bathroom for toileting with SBA and RW. 6. Pt will complete all aspects of toileting task with SBA. 7. Pt will complete oral hygiene/grooming routine in standing at sink with SBA.    8. Pt will complete ther ex/ther act with focus on UB strengthening.

## 2020-04-21 NOTE — PROGRESS NOTES
Results   Component Value Date    PO2ART 107 11/17/2019    FQF6XWF 35.0 11/17/2019     INR:   Recent Labs     04/20/20  0416   INR 1.25       Objective:   Vitals: BP (!) 110/53   Pulse 57   Temp 97.4 °F (36.3 °C) (Axillary)   Resp 16   Ht 5' 8\" (1.727 m)   Wt 155 lb 1.6 oz (70.4 kg)   SpO2 100%   BMI 23.58 kg/m²   General appearance: alert and cooperative with exam, in no acute distress  HEENT: normocephalic, atraumatic,   Neck: supple, trachea midline  Lungs:breathing comfortably  Abdomen: ; non distended,   Extremities: ble edema  Neurologic: alert, oriented, follows commands, interactive    Assessment and Plan:     Patient Active Problem List   Diagnosis    Renal cancer ([facility] [address]    Hypertension    Severe sepsis with acute organ dysfunction ([facility] [address]    Pneumonia due to organism    NSTEMI (non-ST elevated myocardial infarction) ([facility] [address]    Type 2 diabetes mellitus with diabetic peripheral angiopathy and gangrene, without long-term current use of insulin (HCC)    Metabolic encephalopathy    Sepsis with metabolic encephalopathy (HCC)    Impaired cognition    Gait disturbance    Systolic and diastolic CHF, acute on chronic (HCC)    Ischemic cardiomyopathy    Chronic combined systolic and diastolic heart failure (HCC)    ASCVD (arteriosclerotic cardiovascular disease)    Anemia of chronic illness    Nonrheumatic mitral valve regurgitation    Generalized weakness    Debility    Orthostatic hypotension    Recurrent falls    Diabetic peripheral neuropathy associated with type 2 diabetes mellitus (HCC)    Acute post-hemorrhagic anemia            ANABELLE on CKD3: cardiorenal vs ATN, no HN on renal US  Hyperkalemia: resolved  Solitary left kidney  Acute on chronic systolic chf  Dizziness     Suggest   Cr stable  Continue iv lasix  Monitor uop  Avoid nephrotoxins  Will follow  Bmp tomorrow  1800ml oral fluid restriction                             Electronically signed by Carl Walsh DO on 4/21/2020 at 8:38 AM    [facility]  Jaki Betancur MD  [address], DO  [address]  [place], [place]  PHONE: [phone]  FAX: [phone]

## 2020-04-21 NOTE — PROGRESS NOTES
Hospitalist Progress Note      Name:  Brad Gomez /Age/Sex: 1935  (81 y.o. male)   MRN & CSN:  [id number] & [id number] Admission Date/Time: 2020  3:05 AM   Location:  [address] [place] [address]-A PCP: Frankie Gomez MD         Hospital Day: 4    Assessment and Plan:   Brad Gomez is a 81 y.o.  male  who presents with dizziness X 1 day. -- Dizziness likely due to anemia & hypotension     -- Acute on chronic iron deficiency anemia ( Hb 6.6 g/dL, baseline near 7 g/dL) s/p 1 unit of RBC on 20. Post transfusion Hb stable at 7.8 g/dL. Endoscopic evaluation to be done outpatient per GI if his condition improves    -- Acute on chronic systolic HF (pleural effusion, BNP 51,743)    --Right pleural effusion - s/p 1.9 L thoracentesis in IR on   -appreciate cardiology input    -- Type 2 NSTEMI: elevated troponin (0.243) suspected to be due to demand ischemia from anemia. Patient denies chest pain. -- Multivessel CAD ( of RCA and mid LAD- distal LAD fills with collaterals  Lcx 90% and OM 70% per 3/2/19 [facility])    -- Ischemic CMP (EF 30-35% 2019) - not on BB or ACEI due to low BP    -- Chronic hypotension    -- Elevated lactate (5.4) - likely due to hypoperfusion - resolved    -- ANABELLE on CKD3 (baseline 1.3) - possibly cardiorenal. No hydronephrosis. -cr 2.4 on admission and 2.7 on     -- Hyperkalemia (5.8) - corrected with kayexalate. Plan for today  - continue IV Iron supplementation -  is day 4 IV iron sucrose 200 mg daily  - Monitor CBC and transfuse if Hb < 7 g/dL, HGB is 7.8 on   - continue midodrine 10 mg TID and pyridostigmine 60 mg TID. - continue PPI BID  - continue diuresis with Lasix IV 40 mg BID. Maintain teresa for now. - ECHO reviewed: EF 30 to 35%, apical akinesis, moderate MR, no significant change from Echo in 2019  - Palliative care consult appreciated: full code status, appreciate consult  - [address] Cardiology, GI (Dr. Carrie Macias) and nephrology (Dr. Michael Simms) input.

## 2020-04-22 LAB
ANION GAP SERPL CALCULATED.3IONS-SCNC: 12 MMOL/L (ref 4–16)
ANION GAP SERPL CALCULATED.3IONS-SCNC: 14 MMOL/L (ref 4–16)
BUN BLDV-MCNC: 41 MG/DL (ref 6–23)
BUN BLDV-MCNC: 47 MG/DL (ref 6–23)
CALCIUM SERPL-MCNC: 7.8 MG/DL (ref 8.3–10.6)
CALCIUM SERPL-MCNC: 7.9 MG/DL (ref 8.3–10.6)
CHLORIDE BLD-SCNC: 100 MMOL/L (ref 99–110)
CHLORIDE BLD-SCNC: 99 MMOL/L (ref 99–110)
CO2: 26 MMOL/L (ref 21–32)
CO2: 26 MMOL/L (ref 21–32)
CREAT SERPL-MCNC: 2 MG/DL (ref 0.9–1.3)
CREAT SERPL-MCNC: 2.3 MG/DL (ref 0.9–1.3)
GFR AFRICAN AMERICAN: 33 ML/MIN/1.73M2
GFR AFRICAN AMERICAN: 39 ML/MIN/1.73M2
GFR NON-AFRICAN AMERICAN: 27 ML/MIN/1.73M2
GFR NON-AFRICAN AMERICAN: 32 ML/MIN/1.73M2
GLUCOSE BLD-MCNC: 119 MG/DL (ref 70–99)
GLUCOSE BLD-MCNC: 133 MG/DL (ref 70–99)
HCT VFR BLD CALC: 25.5 % (ref 42–52)
HEMOGLOBIN: 7.8 GM/DL (ref 13.5–18)
MCH RBC QN AUTO: 24.4 PG (ref 27–31)
MCHC RBC AUTO-ENTMCNC: 30.6 % (ref 32–36)
MCV RBC AUTO: 79.7 FL (ref 78–100)
PDW BLD-RTO: 18.9 % (ref 11.7–14.9)
PLATELET # BLD: 145 K/CU MM (ref 140–440)
PMV BLD AUTO: 11 FL (ref 7.5–11.1)
POTASSIUM SERPL-SCNC: 2.7 MMOL/L (ref 3.5–5.1)
POTASSIUM SERPL-SCNC: 2.7 MMOL/L (ref 3.5–5.1)
RBC # BLD: 3.2 M/CU MM (ref 4.6–6.2)
SODIUM BLD-SCNC: 138 MMOL/L (ref 135–145)
SODIUM BLD-SCNC: 139 MMOL/L (ref 135–145)
WBC # BLD: 5.3 K/CU MM (ref 4–10.5)

## 2020-04-22 PROCEDURE — 6360000002 HC RX W HCPCS: Performed by: INTERNAL MEDICINE

## 2020-04-22 PROCEDURE — 6360000002 HC RX W HCPCS: Performed by: NURSE PRACTITIONER

## 2020-04-22 PROCEDURE — C9113 INJ PANTOPRAZOLE SODIUM, VIA: HCPCS | Performed by: INTERNAL MEDICINE

## 2020-04-22 PROCEDURE — 1200000000 HC SEMI PRIVATE

## 2020-04-22 PROCEDURE — 6370000000 HC RX 637 (ALT 250 FOR IP): Performed by: INTERNAL MEDICINE

## 2020-04-22 PROCEDURE — 85027 COMPLETE CBC AUTOMATED: CPT

## 2020-04-22 PROCEDURE — 99232 SBSQ HOSP IP/OBS MODERATE 35: CPT | Performed by: NURSE PRACTITIONER

## 2020-04-22 PROCEDURE — 2580000003 HC RX 258: Performed by: INTERNAL MEDICINE

## 2020-04-22 PROCEDURE — 84132 ASSAY OF SERUM POTASSIUM: CPT

## 2020-04-22 PROCEDURE — 80048 BASIC METABOLIC PNL TOTAL CA: CPT

## 2020-04-22 PROCEDURE — 94761 N-INVAS EAR/PLS OXIMETRY MLT: CPT

## 2020-04-22 RX ORDER — POTASSIUM CHLORIDE 7.45 MG/ML
10 INJECTION INTRAVENOUS PRN
Status: DISCONTINUED | OUTPATIENT
Start: 2020-04-22 | End: 2020-04-22 | Stop reason: SDUPTHER

## 2020-04-22 RX ORDER — POTASSIUM CHLORIDE 20 MEQ/1
20 TABLET, EXTENDED RELEASE ORAL
Status: COMPLETED | OUTPATIENT
Start: 2020-04-22 | End: 2020-04-23

## 2020-04-22 RX ORDER — POTASSIUM CHLORIDE 20 MEQ/1
20 TABLET, EXTENDED RELEASE ORAL
Status: DISCONTINUED | OUTPATIENT
Start: 2020-04-22 | End: 2020-04-22

## 2020-04-22 RX ORDER — POTASSIUM CHLORIDE 20 MEQ/1
40 TABLET, EXTENDED RELEASE ORAL PRN
Status: DISCONTINUED | OUTPATIENT
Start: 2020-04-22 | End: 2020-04-23 | Stop reason: HOSPADM

## 2020-04-22 RX ORDER — CARVEDILOL 3.12 MG/1
3.12 TABLET ORAL 2 TIMES DAILY WITH MEALS
Status: DISCONTINUED | OUTPATIENT
Start: 2020-04-22 | End: 2020-04-22

## 2020-04-22 RX ORDER — POTASSIUM CHLORIDE 7.45 MG/ML
10 INJECTION INTRAVENOUS PRN
Status: DISCONTINUED | OUTPATIENT
Start: 2020-04-22 | End: 2020-04-23 | Stop reason: HOSPADM

## 2020-04-22 RX ADMIN — SODIUM CHLORIDE, PRESERVATIVE FREE 10 ML: 5 INJECTION INTRAVENOUS at 05:16

## 2020-04-22 RX ADMIN — POTASSIUM CHLORIDE 10 MEQ: 7.46 INJECTION, SOLUTION INTRAVENOUS at 04:58

## 2020-04-22 RX ADMIN — FUROSEMIDE 40 MG: 10 INJECTION, SOLUTION INTRAMUSCULAR; INTRAVENOUS at 17:42

## 2020-04-22 RX ADMIN — SODIUM CHLORIDE, PRESERVATIVE FREE 10 ML: 5 INJECTION INTRAVENOUS at 20:16

## 2020-04-22 RX ADMIN — POTASSIUM CHLORIDE 20 MEQ: 1500 TABLET, EXTENDED RELEASE ORAL at 16:05

## 2020-04-22 RX ADMIN — MIDODRINE HYDROCHLORIDE 10 MG: 5 TABLET ORAL at 18:04

## 2020-04-22 RX ADMIN — POTASSIUM CHLORIDE 10 MEQ: 7.46 INJECTION, SOLUTION INTRAVENOUS at 10:20

## 2020-04-22 RX ADMIN — POTASSIUM CHLORIDE 10 MEQ: 7.46 INJECTION, SOLUTION INTRAVENOUS at 08:02

## 2020-04-22 RX ADMIN — POTASSIUM CHLORIDE 20 MEQ: 1500 TABLET, EXTENDED RELEASE ORAL at 20:15

## 2020-04-22 RX ADMIN — PYRIDOSTIGMINE BROMIDE 60 MG: 60 TABLET ORAL at 20:16

## 2020-04-22 RX ADMIN — PYRIDOSTIGMINE BROMIDE 60 MG: 60 TABLET ORAL at 10:41

## 2020-04-22 RX ADMIN — PANTOPRAZOLE SODIUM 40 MG: 40 INJECTION, POWDER, FOR SOLUTION INTRAVENOUS at 17:43

## 2020-04-22 RX ADMIN — PANTOPRAZOLE SODIUM 40 MG: 40 INJECTION, POWDER, FOR SOLUTION INTRAVENOUS at 05:16

## 2020-04-22 RX ADMIN — FOLIC ACID 2 MG: 1 TABLET ORAL at 10:41

## 2020-04-22 RX ADMIN — PYRIDOSTIGMINE BROMIDE 60 MG: 60 TABLET ORAL at 14:09

## 2020-04-22 RX ADMIN — POTASSIUM CHLORIDE 20 MEQ: 1500 TABLET, EXTENDED RELEASE ORAL at 22:39

## 2020-04-22 RX ADMIN — FUROSEMIDE 40 MG: 10 INJECTION, SOLUTION INTRAMUSCULAR; INTRAVENOUS at 10:55

## 2020-04-22 RX ADMIN — MIDODRINE HYDROCHLORIDE 10 MG: 5 TABLET ORAL at 10:41

## 2020-04-22 RX ADMIN — MIDODRINE HYDROCHLORIDE 10 MG: 5 TABLET ORAL at 14:09

## 2020-04-22 RX ADMIN — POTASSIUM CHLORIDE 10 MEQ: 7.46 INJECTION, SOLUTION INTRAVENOUS at 06:27

## 2020-04-22 RX ADMIN — POTASSIUM CHLORIDE 20 MEQ: 1500 TABLET, EXTENDED RELEASE ORAL at 18:04

## 2020-04-22 RX ADMIN — SODIUM CHLORIDE 200 MG: 900 INJECTION, SOLUTION INTRAVENOUS at 10:42

## 2020-04-22 ASSESSMENT — PAIN SCALES - GENERAL: PAINLEVEL_OUTOF10: 0

## 2020-04-22 NOTE — PROGRESS NOTES
Pt had critical lab called via [place] [id number] lab for potassium of 2.7 low. Orders placed via skye monson np for 6x k riders potassium ivpb. 60meq. Patient iv lost right arm and accessed left wrist 22g. Restarted iv k riders. Will continue to monitor and assess pt.

## 2020-04-22 NOTE — PLAN OF CARE
Problem: Falls - Risk of:  Goal: Will remain free from falls  Description: Will remain free from falls  4/21/2020 2330 by Monet Pablo LPN  Outcome: Ongoing  4/21/2020 1127 by Jeremy Moreno LPN  Outcome: Ongoing  Goal: Absence of physical injury  Description: Absence of physical injury  4/21/2020 2330 by Monet Pablo LPN  Outcome: Ongoing  4/21/2020 1127 by Jeremy Moreno LPN  Outcome: Ongoing     Problem: Infection:  Goal: Will remain free from infection  Description: Will remain free from infection  4/21/2020 2330 by Monet Pablo LPN  Outcome: Ongoing  4/21/2020 1127 by Jeremy Moreno LPN  Outcome: Ongoing     Problem: Safety:  Goal: Free from accidental physical injury  Description: Free from accidental physical injury  4/21/2020 2330 by Monet Pablo LPN  Outcome: Ongoing  4/21/2020 1127 by Jeremy Moreno LPN  Outcome: Ongoing  Goal: Free from intentional harm  Description: Free from intentional harm  4/21/2020 2330 by Monet Pablo LPN  Outcome: Ongoing  4/21/2020 1127 by Jeremy Moreno LPN  Outcome: Ongoing     Problem: Daily Care:  Goal: Daily care needs are met  Description: Daily care needs are met  4/21/2020 2330 by Monet Pablo LPN  Outcome: Ongoing  4/21/2020 1127 by Jeremy Moreno LPN  Outcome: Ongoing     Problem: Pain:  Goal: Patient's pain/discomfort is manageable  Description: Patient's pain/discomfort is manageable  4/21/2020 2330 by Monet Pablo LPN  Outcome: Ongoing  4/21/2020 1127 by Jeremy Moreno LPN  Outcome: Ongoing     Problem: Skin Integrity:  Goal: Skin integrity will stabilize  Description: Skin integrity will stabilize  4/21/2020 2330 by Monet Pablo LPN  Outcome: Ongoing  4/21/2020 1127 by Jeremy Moreno LPN  Outcome: Ongoing     Problem: Discharge Planning:  Goal: Patients continuum of care needs are met  Description: Patients continuum of care needs are met  4/21/2020 2330 by Monet Pablo LPN  Outcome: Ongoing  4/21/2020 1127 by Jeremy Moreno LPN  Outcome:

## 2020-04-22 NOTE — PROGRESS NOTES
significant change from Echo in November 2019  - Palliative care consult appreciated: full code status, appreciate consult  - [address] Cardiology, GI (Dr. Beth Christensen) and nephrology (Dr. Meryle Mire) input. Chronic diagnoses  -- COPD with no exacerbation. -- Colon cancer status post colectomy-2012  -- Renal cell carcinoma status post right radical nephrectomy - 2013. History of Present Illness:     Sitting up at edge of bed, feels better, potassium infusing    Objective: Intake/Output Summary (Last 24 hours) at 4/22/2020 0939  Last data filed at 4/22/2020 0451  Gross per 24 hour   Intake --   Output 2625 ml   Net -2625 ml      Vitals:   Vitals:    04/22/20 0450   BP: (!) 108/52   Pulse: 63   Resp: 15   Temp: 97.5 °F (36.4 °C)   SpO2: 98%     Physical Exam:   Physical Exam  Constitutional:       General: He is not in acute distress. Pulmonary:      Effort: Pulmonary effort is normal.   Neurological:      General: No focal deficit present.      Balance when sitting is good, was sitting at edge of bed      Medications:   Medications:    potassium chloride  20 mEq Oral TID WC    sodium chloride flush  10 mL Intravenous 2 times per day    pantoprazole  40 mg Intravenous Q12H    And    sodium chloride (PF)  10 mL Intravenous M09N    folic acid  2 mg Oral Daily    midodrine  10 mg Oral TID WC    pyridostigmine  60 mg Oral TID    [Held by provider] ferrous sulfate  325 mg Oral BID WC    furosemide  40 mg Intravenous BID    iron sucrose  200 mg Intravenous Q24H      Infusions:     PRN Meds: potassium chloride, 40 mEq, PRN    Or  potassium alternative oral replacement, 40 mEq, PRN    Or  potassium chloride, 10 mEq, PRN  sodium chloride flush, 10 mL, PRN  acetaminophen, 650 mg, Q4H PRN  promethazine, 12.5 mg, Q6H PRN    Or  ondansetron, 4 mg, Q6H PRN        CBC   Recent Labs     04/20/20  0416 04/21/20  0144 04/22/20  0313   WBC 6.2 5.3 5.3   HGB 7.8* 7.8* 7.8*   HCT 27.7* 26.0* 25.5*    147 145      BMP

## 2020-04-22 NOTE — PROGRESS NOTES
Nephrology Progress Note        [address], [address] [address]  Phone: [phone]  Office Hours: 8:30AM - 4:30PM  Monday - Friday       [facility]  MD Eladio Aponte DO [address],  [address]  [address], [place] [address]  PHONE: [phone]  FAX: [phone]    4/22/2020 8:03 AM  Subjective:   Admit Date: 4/18/2020  PCP: Deng Maria MD  Interval History:   Nonoliguric  Doing well    Diet: DIET CARDIAC; Daily Fluid Restriction: 1800 ml      Data:   Scheduled Meds:   potassium chloride  20 mEq Oral TID WC    sodium chloride flush  10 mL Intravenous 2 times per day    pantoprazole  40 mg Intravenous Q12H    And    sodium chloride (PF)  10 mL Intravenous P01R    folic acid  2 mg Oral Daily    midodrine  10 mg Oral TID WC    pyridostigmine  60 mg Oral TID    [Held by provider] ferrous sulfate  325 mg Oral BID WC    furosemide  40 mg Intravenous BID    iron sucrose  200 mg Intravenous Q24H     Continuous Infusions:  PRN Meds:potassium chloride **OR** potassium alternative oral replacement **OR** potassium chloride, sodium chloride flush, acetaminophen, promethazine **OR** ondansetron  I/O last 3 completed shifts: In: 300 [P.O.:300]  Out: 2625 [Urine:2625]  No intake/output data recorded. Intake/Output Summary (Last 24 hours) at 4/22/2020 0803  Last data filed at 4/22/2020 0451  Gross per 24 hour   Intake 300 ml   Output 2625 ml   Net -2325 ml       CBC:   Recent Labs     04/20/20  0416 04/21/20  0144 04/22/20 0313   WBC 6.2 5.3 5.3   HGB 7.8* 7.8* 7.8*    147 145       BMP:    Recent Labs     04/20/20  0416 04/22/20 0313    138   K 3.5 2.7*    100   CO2 19* 26   BUN 62* 47*   CREATININE 2.7* 2.3*   GLUCOSE 76 119*     Hepatic:   Recent Labs     04/20/20 0416   *   *   BILITOT 0.8   ALKPHOS 460*     Troponin: No results for input(s): TROPONINI in the last 72 hours.   BNP: No results for

## 2020-04-22 NOTE — PROGRESS NOTES
Palliative Care RN visit. Patient sitting on side of bed. Just finished his breakfast which he ate 100 %. He offered no complaints. Stated \" I feel good today\" Makeda conversation talking, and reminiscing about life. He became a little tearful, but it was a very pleasant conversation. Chart reviewed and looks like plan remains for discharge to [facility] once medically stable.

## 2020-04-23 ENCOUNTER — TELEPHONE (OUTPATIENT)
Dept: CARDIOLOGY CLINIC | Age: 85
End: 2020-04-23

## 2020-04-23 VITALS
HEIGHT: 68 IN | RESPIRATION RATE: 20 BRPM | TEMPERATURE: 97.8 F | SYSTOLIC BLOOD PRESSURE: 95 MMHG | OXYGEN SATURATION: 100 % | BODY MASS INDEX: 22.61 KG/M2 | DIASTOLIC BLOOD PRESSURE: 53 MMHG | WEIGHT: 149.2 LBS | HEART RATE: 73 BPM

## 2020-04-23 LAB
ANION GAP SERPL CALCULATED.3IONS-SCNC: 12 MMOL/L (ref 4–16)
BUN BLDV-MCNC: 45 MG/DL (ref 6–23)
CALCIUM SERPL-MCNC: 8.3 MG/DL (ref 8.3–10.6)
CHLORIDE BLD-SCNC: 101 MMOL/L (ref 99–110)
CO2: 26 MMOL/L (ref 21–32)
CREAT SERPL-MCNC: 2 MG/DL (ref 0.9–1.3)
CULTURE: NORMAL
CULTURE: NORMAL
EKG ATRIAL RATE: 72 BPM
EKG DIAGNOSIS: NORMAL
EKG P-R INTERVAL: 144 MS
EKG Q-T INTERVAL: 408 MS
EKG QRS DURATION: 86 MS
EKG QTC CALCULATION (BAZETT): 446 MS
EKG R AXIS: 74 DEGREES
EKG T AXIS: 205 DEGREES
EKG VENTRICULAR RATE: 72 BPM
GFR AFRICAN AMERICAN: 39 ML/MIN/1.73M2
GFR NON-AFRICAN AMERICAN: 32 ML/MIN/1.73M2
GLUCOSE BLD-MCNC: 127 MG/DL (ref 70–99)
HCT VFR BLD CALC: 30.8 % (ref 42–52)
HEMOGLOBIN: 9 GM/DL (ref 13.5–18)
Lab: NORMAL
Lab: NORMAL
MAGNESIUM: 1.8 MG/DL (ref 1.8–2.4)
MCH RBC QN AUTO: 24.1 PG (ref 27–31)
MCHC RBC AUTO-ENTMCNC: 29.2 % (ref 32–36)
MCV RBC AUTO: 82.4 FL (ref 78–100)
PDW BLD-RTO: 20.3 % (ref 11.7–14.9)
PLATELET # BLD: 154 K/CU MM (ref 140–440)
PMV BLD AUTO: 12.1 FL (ref 7.5–11.1)
POTASSIUM SERPL-SCNC: 3.7 MMOL/L (ref 3.5–5.1)
POTASSIUM SERPL-SCNC: 3.7 MMOL/L (ref 3.5–5.1)
RBC # BLD: 3.74 M/CU MM (ref 4.6–6.2)
SODIUM BLD-SCNC: 139 MMOL/L (ref 135–145)
SPECIMEN: NORMAL
SPECIMEN: NORMAL
WBC # BLD: 6.2 K/CU MM (ref 4–10.5)

## 2020-04-23 PROCEDURE — 6360000002 HC RX W HCPCS: Performed by: INTERNAL MEDICINE

## 2020-04-23 PROCEDURE — 93005 ELECTROCARDIOGRAM TRACING: CPT | Performed by: INTERNAL MEDICINE

## 2020-04-23 PROCEDURE — 84132 ASSAY OF SERUM POTASSIUM: CPT

## 2020-04-23 PROCEDURE — 83735 ASSAY OF MAGNESIUM: CPT

## 2020-04-23 PROCEDURE — 2580000003 HC RX 258: Performed by: INTERNAL MEDICINE

## 2020-04-23 PROCEDURE — 80048 BASIC METABOLIC PNL TOTAL CA: CPT

## 2020-04-23 PROCEDURE — 99232 SBSQ HOSP IP/OBS MODERATE 35: CPT | Performed by: NURSE PRACTITIONER

## 2020-04-23 PROCEDURE — 6370000000 HC RX 637 (ALT 250 FOR IP): Performed by: INTERNAL MEDICINE

## 2020-04-23 PROCEDURE — 85027 COMPLETE CBC AUTOMATED: CPT

## 2020-04-23 PROCEDURE — 36415 COLL VENOUS BLD VENIPUNCTURE: CPT

## 2020-04-23 PROCEDURE — 93010 ELECTROCARDIOGRAM REPORT: CPT | Performed by: INTERNAL MEDICINE

## 2020-04-23 PROCEDURE — C9113 INJ PANTOPRAZOLE SODIUM, VIA: HCPCS | Performed by: INTERNAL MEDICINE

## 2020-04-23 RX ORDER — FERROUS SULFATE 325(65) MG
325 TABLET ORAL DAILY
Qty: 30 TABLET | Refills: 3 | Status: SHIPPED | OUTPATIENT
Start: 2020-04-23

## 2020-04-23 RX ORDER — METOLAZONE 5 MG/1
TABLET ORAL
Qty: 1 TABLET | Refills: 0
Start: 2020-04-23

## 2020-04-23 RX ORDER — POLYETHYLENE GLYCOL 3350 17 G/17G
17 POWDER, FOR SOLUTION ORAL DAILY
Qty: 1530 G | Refills: 1 | Status: SHIPPED | OUTPATIENT
Start: 2020-04-23 | End: 2020-05-23

## 2020-04-23 RX ORDER — POTASSIUM CHLORIDE 20 MEQ/1
40 TABLET, EXTENDED RELEASE ORAL ONCE
Status: COMPLETED | OUTPATIENT
Start: 2020-04-23 | End: 2020-04-23

## 2020-04-23 RX ORDER — METOLAZONE 5 MG/1
5 TABLET ORAL DAILY
Status: DISCONTINUED | OUTPATIENT
Start: 2020-04-23 | End: 2020-04-23 | Stop reason: HOSPADM

## 2020-04-23 RX ORDER — FUROSEMIDE 80 MG
80 TABLET ORAL 2 TIMES DAILY
Qty: 60 TABLET | Refills: 3
Start: 2020-04-23

## 2020-04-23 RX ORDER — FUROSEMIDE 40 MG/1
80 TABLET ORAL 2 TIMES DAILY
Status: DISCONTINUED | OUTPATIENT
Start: 2020-04-23 | End: 2020-04-23 | Stop reason: HOSPADM

## 2020-04-23 RX ADMIN — MIDODRINE HYDROCHLORIDE 10 MG: 5 TABLET ORAL at 08:29

## 2020-04-23 RX ADMIN — PYRIDOSTIGMINE BROMIDE 60 MG: 60 TABLET ORAL at 08:28

## 2020-04-23 RX ADMIN — METOLAZONE 5 MG: 5 TABLET ORAL at 08:29

## 2020-04-23 RX ADMIN — FOLIC ACID 2 MG: 1 TABLET ORAL at 08:29

## 2020-04-23 RX ADMIN — PANTOPRAZOLE SODIUM 40 MG: 40 INJECTION, POWDER, FOR SOLUTION INTRAVENOUS at 06:02

## 2020-04-23 RX ADMIN — POTASSIUM CHLORIDE 20 MEQ: 1500 TABLET, EXTENDED RELEASE ORAL at 00:24

## 2020-04-23 RX ADMIN — SODIUM CHLORIDE, PRESERVATIVE FREE 10 ML: 5 INJECTION INTRAVENOUS at 08:29

## 2020-04-23 RX ADMIN — PYRIDOSTIGMINE BROMIDE 60 MG: 60 TABLET ORAL at 13:55

## 2020-04-23 RX ADMIN — MIDODRINE HYDROCHLORIDE 10 MG: 5 TABLET ORAL at 11:57

## 2020-04-23 RX ADMIN — POTASSIUM CHLORIDE 40 MEQ: 1500 TABLET, EXTENDED RELEASE ORAL at 08:28

## 2020-04-23 RX ADMIN — FUROSEMIDE 80 MG: 40 TABLET ORAL at 08:29

## 2020-04-23 RX ADMIN — POTASSIUM CHLORIDE 20 MEQ: 1500 TABLET, EXTENDED RELEASE ORAL at 02:20

## 2020-04-23 ASSESSMENT — PAIN SCALES - GENERAL: PAINLEVEL_OUTOF10: 0

## 2020-04-23 NOTE — TELEPHONE ENCOUNTER
Daughter called Dad is coming to end of   Life she has some questions about Hospice   And transferring him to [place] for   Hospice care

## 2020-04-23 NOTE — PROGRESS NOTES
Palliative Care RN visit. Patient is now alert and cooperative. He is in chair watching TV.  in room. He offered that he ate a good breakfast, even telling me what he ate. Plan remains to [facility] upon discharge, once medically stable.

## 2020-04-23 NOTE — PROGRESS NOTES
Notified Dr. Sridhar Smith face to face that pt has had an increase in confusion and impulsivity overnight.  in room for pt safety.   Veronika Vance, SIXTON, RN

## 2020-04-23 NOTE — PROGRESS NOTES
chloride **OR** potassium alternative oral replacement **OR** potassium chloride, sodium chloride flush, acetaminophen, promethazine **OR** ondansetron    Lab Data:  CBC:   Recent Labs     04/21/20  0144 04/22/20  0313 04/23/20  0517   WBC 5.3 5.3 6.2   HGB 7.8* 7.8* 9.0*   HCT 26.0* 25.5* 30.8*   MCV 79.0 79.7 82.4    145 154     BMP:   Recent Labs     04/22/20  0313 04/22/20  1238 04/23/20  0517    139 139   K 2.7* 2.7* 3.7  3.7    99 101   CO2 26 26 26   BUN 47* 41* 45*   CREATININE 2.3* 2.0* 2.0*     LIVER PROFILE:   No results for input(s): AST, ALT, LIPASE, BILIDIR, BILITOT, ALKPHOS in the last 72 hours. Invalid input(s): AMYLASE,  ALB  PT/INR:   No results for input(s): PROTIME, INR in the last 72 hours. APTT:   No results for input(s): APTT in the last 72 hours. BNP:  No results for input(s): BNP in the last 72 hours. TROPONIN: @TROPONINI:3@      Assessment:  NSTEMI   CHF  severe anemia  VHD- moderate MR   Hypotension  CKD     NSTEMI in the setting for profound anemia- type 2 leak- cath reviewed has  of RCA and LAD:  Circ had 90 % proximal lesion- considered high risk for CABG- can consider intervention to the  if patient remains symptomatic - family discussion done with Dr. Kathryn Lott on admission and they would like medical mangement- will continue with medical management.      Ischemic cardiomyopathy- EF 30-35% - clinically improving-nephrology assisting with diuresis-  unable to use guideline reccommended therapy as his BP is soft to low    NSVT- may be secondary to hypokalemia if continues after replacement of potassium will consider adding amiodarone  - check magnesium- recommend to keep between 2.0-2.2    Hypokalemia: improved- continue with po potassium-recommend to keep potassium 4.0-4.5    Anemia- hemoglobin 6.6 on admission-  has received 1 blood -recommend to keep hemoglobin 8 - stable     Hypotension- on ProAmatine and mestinon  for BP assistance       Tania Paris, APRN- CNP  4/23/2020 12:20 PM

## 2020-04-23 NOTE — PROGRESS NOTES
Attempted to call report to [address] transferred call to nurse and no answer, left voicemail with this nurse return call number and patient name. Will attempt once more prior to transfer at 1730 via [facility].

## 2020-04-23 NOTE — DISCHARGE SUMMARY
VLY:[id number]    Printed on:04/23/20 5421   Medication Information                      aspirin EC 81 MG EC tablet  Take 1 tablet by mouth daily             atorvastatin (LIPITOR) 40 MG tablet  Take 1 tablet by mouth nightly             folic acid (FOLVITE) 1 MG tablet  Take 1 tablet by mouth daily             isosorbide mononitrate (IMDUR) 30 MG extended release tablet  Take 30 mg by mouth daily             midodrine (PROAMATINE) 10 MG tablet  Take 1 tablet by mouth 3 times daily (with meals)             pantoprazole (PROTONIX) 40 MG tablet  Take 1 tablet by mouth every morning (before breakfast)             pyridostigmine (MESTINON) 60 MG tablet  Take 60 mg by mouth 3 times daily                 Objective Findings at Discharge:   BP (!) 103/56   Pulse 80   Temp 98 °F (36.7 °C) (Oral)   Resp 16   Ht 5' 8\" (1.727 m)   Wt 149 lb 3.2 oz (67.7 kg)   SpO2 93%   BMI 22.69 kg/m²            PHYSICAL EXAM   GEN Awake male, sitting upright in bed in no apparent distress. Appears given age.     LABS:    CBC:   Lab Results   Component Value Date    WBC 6.2 04/23/2020    HGB 9.0 04/23/2020    HCT 30.8 04/23/2020    MCV 82.4 04/23/2020     04/23/2020     BMP:   Lab Results   Component Value Date     04/23/2020    K 3.7 04/23/2020    K 3.7 04/23/2020    K 4.6 03/14/2018     04/23/2020    CO2 26 04/23/2020    PHOS 4.2 03/02/2018    PHOS 4.1 03/02/2018    BUN 45 04/23/2020    CREATININE 2.0 04/23/2020    CALCIUM 8.3 04/23/2020       IMAGING:     IR GUIDED THORACENTESIS PLEURAL [[id number]] Collected: 04/21/20 2154     Order Status: Completed Updated: 04/21/20 0836     Narrative:       PROCEDURE:  ULTRASOUNDGUIDED RIGHT THORACENTESIS    4/20/2020    HISTORY:  ORDERING SYSTEM PROVIDED HISTORY: Pleural effusion  TECHNOLOGIST PROVIDED HISTORY:  Reason for exam:->thoracentesis    TECHNIQUE:  Informed consent was obtained after a detailed explanation of the procedure  including risks, benefits, and effusions are present with associated  bibasilar compressive atelectasis. Lungs are otherwise well aerated. Bones and soft tissues are unremarkable. Impression:       Mild-to-moderate bilateral layering pleural effusions, as discussed above,  not visualized on prior examination.          Discharge Time of 90 minutes    Electronically signed by Nathanael Barber MD on 4/23/2020 at 1:54 PM

## 2020-04-24 ENCOUNTER — HOSPITAL ENCOUNTER (OUTPATIENT)
Age: 85
Setting detail: SPECIMEN
Discharge: HOME | End: 2020-04-24

## 2020-04-24 LAB
ALBUMIN SERPL-MCNC: 3.5 GM/DL (ref 3.4–5)
ALP BLD-CCNC: 264 IU/L (ref 40–129)
ALT SERPL-CCNC: 103 U/L (ref 10–40)
ANION GAP SERPL CALCULATED.3IONS-SCNC: 11 MMOL/L (ref 4–16)
AST SERPL-CCNC: 35 IU/L (ref 15–37)
BILIRUB SERPL-MCNC: 0.6 MG/DL (ref 0–1)
BUN BLDV-MCNC: 38 MG/DL (ref 6–23)
CALCIUM SERPL-MCNC: 8.4 MG/DL (ref 8.3–10.6)
CHLORIDE BLD-SCNC: 99 MMOL/L (ref 99–110)
CO2: 33 MMOL/L (ref 21–32)
CREAT SERPL-MCNC: 1.6 MG/DL (ref 0.9–1.3)
GFR AFRICAN AMERICAN: 50 ML/MIN/1.73M2
GFR NON-AFRICAN AMERICAN: 41 ML/MIN/1.73M2
GLUCOSE BLD-MCNC: 83 MG/DL (ref 70–99)
HCT VFR BLD CALC: 30.5 % (ref 42–52)
HEMOGLOBIN: 8.7 GM/DL (ref 13.5–18)
MCH RBC QN AUTO: 23.9 PG (ref 27–31)
MCHC RBC AUTO-ENTMCNC: 28.5 % (ref 32–36)
MCV RBC AUTO: 83.8 FL (ref 78–100)
PDW BLD-RTO: 22 % (ref 11.7–14.9)
PLATELET # BLD: 162 K/CU MM (ref 140–440)
PMV BLD AUTO: 12.6 FL (ref 7.5–11.1)
POTASSIUM SERPL-SCNC: 3.6 MMOL/L (ref 3.5–5.1)
RBC # BLD: 3.64 M/CU MM (ref 4.6–6.2)
SODIUM BLD-SCNC: 143 MMOL/L (ref 135–145)
TOTAL PROTEIN: 5.4 GM/DL (ref 6.4–8.2)
TSH HIGH SENSITIVITY: 3.95 UIU/ML (ref 0.27–4.2)
VITAMIN B-12: 1603 PG/ML (ref 211–911)
WBC # BLD: 6.3 K/CU MM (ref 4–10.5)

## 2020-04-24 PROCEDURE — 82607 VITAMIN B-12: CPT

## 2020-04-24 PROCEDURE — 80053 COMPREHEN METABOLIC PANEL: CPT

## 2020-04-24 PROCEDURE — 36415 COLL VENOUS BLD VENIPUNCTURE: CPT

## 2020-04-24 PROCEDURE — 85027 COMPLETE CBC AUTOMATED: CPT

## 2020-04-24 PROCEDURE — 84443 ASSAY THYROID STIM HORMONE: CPT

## 2020-04-28 ENCOUNTER — HOSPITAL ENCOUNTER (OUTPATIENT)
Age: 85
Setting detail: SPECIMEN
Discharge: HOME | End: 2020-04-28

## 2020-04-28 LAB
ALBUMIN SERPL-MCNC: 3.2 GM/DL (ref 3.4–5)
ALP BLD-CCNC: 172 IU/L (ref 40–128)
ALT SERPL-CCNC: 38 U/L (ref 10–40)
ANION GAP SERPL CALCULATED.3IONS-SCNC: 15 MMOL/L (ref 4–16)
AST SERPL-CCNC: 24 IU/L (ref 15–37)
BILIRUB SERPL-MCNC: 0.7 MG/DL (ref 0–1)
BUN BLDV-MCNC: 36 MG/DL (ref 6–23)
CALCIUM SERPL-MCNC: 8.3 MG/DL (ref 8.3–10.6)
CHLORIDE BLD-SCNC: 87 MMOL/L (ref 99–110)
CO2: 38 MMOL/L (ref 21–32)
CREAT SERPL-MCNC: 1.5 MG/DL (ref 0.9–1.3)
GFR AFRICAN AMERICAN: 54 ML/MIN/1.73M2
GFR NON-AFRICAN AMERICAN: 45 ML/MIN/1.73M2
GLUCOSE BLD-MCNC: 103 MG/DL (ref 70–99)
HCT VFR BLD CALC: 29 % (ref 42–52)
HEMOGLOBIN: 8.5 GM/DL (ref 13.5–18)
MCH RBC QN AUTO: 25.3 PG (ref 27–31)
MCHC RBC AUTO-ENTMCNC: 29.3 % (ref 32–36)
MCV RBC AUTO: 86.3 FL (ref 78–100)
PDW BLD-RTO: 28 % (ref 11.7–14.9)
PLATELET # BLD: 141 K/CU MM (ref 140–440)
PMV BLD AUTO: 12.1 FL (ref 7.5–11.1)
POTASSIUM SERPL-SCNC: 2.7 MMOL/L (ref 3.5–5.1)
RBC # BLD: 3.36 M/CU MM (ref 4.6–6.2)
SODIUM BLD-SCNC: 140 MMOL/L (ref 135–145)
TOTAL PROTEIN: 5.1 GM/DL (ref 6.4–8.2)
WBC # BLD: 7.5 K/CU MM (ref 4–10.5)

## 2020-04-28 PROCEDURE — 85027 COMPLETE CBC AUTOMATED: CPT

## 2020-04-28 PROCEDURE — 36415 COLL VENOUS BLD VENIPUNCTURE: CPT

## 2020-04-28 PROCEDURE — 80053 COMPREHEN METABOLIC PANEL: CPT

## 2020-04-30 ENCOUNTER — HOSPITAL ENCOUNTER (OUTPATIENT)
Age: 85
Setting detail: SPECIMEN
Discharge: HOME | End: 2020-04-30

## 2020-04-30 PROCEDURE — 87086 URINE CULTURE/COLONY COUNT: CPT

## 2020-04-30 PROCEDURE — 81001 URINALYSIS AUTO W/SCOPE: CPT

## 2020-05-01 ENCOUNTER — HOSPITAL ENCOUNTER (OUTPATIENT)
Age: 85
Setting detail: SPECIMEN
Discharge: HOME | End: 2020-05-01

## 2020-05-01 LAB
ANION GAP SERPL CALCULATED.3IONS-SCNC: 15 MMOL/L (ref 4–16)
BACTERIA: ABNORMAL /HPF
BILIRUBIN URINE: NEGATIVE MG/DL
BLOOD, URINE: ABNORMAL
BUN BLDV-MCNC: 40 MG/DL (ref 6–23)
CALCIUM SERPL-MCNC: 8.6 MG/DL (ref 8.3–10.6)
CHLORIDE BLD-SCNC: 89 MMOL/L (ref 99–110)
CLARITY: CLEAR
CO2: 33 MMOL/L (ref 21–32)
COLOR: YELLOW
CREAT SERPL-MCNC: 1.6 MG/DL (ref 0.9–1.3)
GFR AFRICAN AMERICAN: 50 ML/MIN/1.73M2
GFR NON-AFRICAN AMERICAN: 41 ML/MIN/1.73M2
GLUCOSE BLD-MCNC: 155 MG/DL (ref 70–99)
GLUCOSE, URINE: NEGATIVE MG/DL
HYALINE CASTS: >20 /LPF
KETONES, URINE: NEGATIVE MG/DL
LEUKOCYTE ESTERASE, URINE: ABNORMAL
MUCUS: ABNORMAL HPF
NITRITE URINE, QUANTITATIVE: NEGATIVE
PH, URINE: 6 (ref 5–8)
POTASSIUM SERPL-SCNC: 3.3 MMOL/L (ref 3.5–5.1)
PROTEIN UA: 100 MG/DL
RBC URINE: 30 /HPF (ref 0–3)
SODIUM BLD-SCNC: 137 MMOL/L (ref 135–145)
SPECIFIC GRAVITY UA: 1.01 (ref 1–1.03)
TRICHOMONAS: ABNORMAL /HPF
UROBILINOGEN, URINE: NORMAL MG/DL (ref 0.2–1)
WBC UA: 4 /HPF (ref 0–2)

## 2020-05-01 PROCEDURE — 36415 COLL VENOUS BLD VENIPUNCTURE: CPT

## 2020-05-01 PROCEDURE — 80048 BASIC METABOLIC PNL TOTAL CA: CPT

## 2020-05-02 LAB
CULTURE: NORMAL
Lab: NORMAL
SPECIMEN: NORMAL

## 2020-05-05 ENCOUNTER — HOSPITAL ENCOUNTER (OUTPATIENT)
Age: 85
Setting detail: SPECIMEN
Discharge: HOME | End: 2020-05-05

## 2020-05-05 LAB
ALBUMIN SERPL-MCNC: 3.5 GM/DL (ref 3.4–5)
ALP BLD-CCNC: 133 IU/L (ref 40–128)
ALT SERPL-CCNC: 18 U/L (ref 10–40)
ANION GAP SERPL CALCULATED.3IONS-SCNC: 12 MMOL/L (ref 4–16)
AST SERPL-CCNC: 24 IU/L (ref 15–37)
BILIRUB SERPL-MCNC: 0.3 MG/DL (ref 0–1)
BUN BLDV-MCNC: 36 MG/DL (ref 6–23)
CALCIUM SERPL-MCNC: 9.2 MG/DL (ref 8.3–10.6)
CHLORIDE BLD-SCNC: 92 MMOL/L (ref 99–110)
CO2: 33 MMOL/L (ref 21–32)
CREAT SERPL-MCNC: 1.5 MG/DL (ref 0.9–1.3)
GFR AFRICAN AMERICAN: 54 ML/MIN/1.73M2
GFR NON-AFRICAN AMERICAN: 45 ML/MIN/1.73M2
GLUCOSE BLD-MCNC: 86 MG/DL (ref 70–99)
HCT VFR BLD CALC: 34.6 % (ref 42–52)
HEMOGLOBIN: 9.9 GM/DL (ref 13.5–18)
MCH RBC QN AUTO: 26.3 PG (ref 27–31)
MCHC RBC AUTO-ENTMCNC: 28.6 % (ref 32–36)
MCV RBC AUTO: 92 FL (ref 78–100)
PDW BLD-RTO: 29.1 % (ref 11.7–14.9)
PLATELET # BLD: 313 K/CU MM (ref 140–440)
PMV BLD AUTO: 10.9 FL (ref 7.5–11.1)
POTASSIUM SERPL-SCNC: 4.3 MMOL/L (ref 3.5–5.1)
RBC # BLD: 3.76 M/CU MM (ref 4.6–6.2)
SODIUM BLD-SCNC: 137 MMOL/L (ref 135–145)
TOTAL PROTEIN: 5.4 GM/DL (ref 6.4–8.2)
WBC # BLD: 4.3 K/CU MM (ref 4–10.5)

## 2020-05-05 PROCEDURE — 85027 COMPLETE CBC AUTOMATED: CPT

## 2020-05-05 PROCEDURE — 36415 COLL VENOUS BLD VENIPUNCTURE: CPT

## 2020-05-05 PROCEDURE — 80053 COMPREHEN METABOLIC PANEL: CPT

## 2020-05-06 LAB — HEMOCCULT SP1 STL QL: POSITIVE

## 2020-05-08 ENCOUNTER — HOSPITAL ENCOUNTER (OUTPATIENT)
Age: 85
Setting detail: SPECIMEN
Discharge: HOME | End: 2020-05-08

## 2020-05-08 LAB
ANION GAP SERPL CALCULATED.3IONS-SCNC: 13 MMOL/L (ref 4–16)
BUN BLDV-MCNC: 33 MG/DL (ref 6–23)
CALCIUM SERPL-MCNC: 9.1 MG/DL (ref 8.3–10.6)
CHLORIDE BLD-SCNC: 94 MMOL/L (ref 99–110)
CO2: 32 MMOL/L (ref 21–32)
CREAT SERPL-MCNC: 1.7 MG/DL (ref 0.9–1.3)
GFR AFRICAN AMERICAN: 47 ML/MIN/1.73M2
GFR NON-AFRICAN AMERICAN: 39 ML/MIN/1.73M2
GLUCOSE BLD-MCNC: 87 MG/DL (ref 70–99)
POTASSIUM SERPL-SCNC: 3.8 MMOL/L (ref 3.5–5.1)
SODIUM BLD-SCNC: 139 MMOL/L (ref 135–145)

## 2020-05-08 PROCEDURE — 36415 COLL VENOUS BLD VENIPUNCTURE: CPT

## 2020-05-08 PROCEDURE — 80048 BASIC METABOLIC PNL TOTAL CA: CPT

## 2020-05-12 ENCOUNTER — HOSPITAL ENCOUNTER (OUTPATIENT)
Age: 85
Setting detail: SPECIMEN
Discharge: HOME | End: 2020-05-12

## 2020-05-12 LAB
ALBUMIN SERPL-MCNC: 3.5 GM/DL (ref 3.4–5)
ALP BLD-CCNC: 104 IU/L (ref 40–128)
ALT SERPL-CCNC: 10 U/L (ref 10–40)
ANION GAP SERPL CALCULATED.3IONS-SCNC: 16 MMOL/L (ref 4–16)
AST SERPL-CCNC: 17 IU/L (ref 15–37)
BILIRUB SERPL-MCNC: 0.3 MG/DL (ref 0–1)
BUN BLDV-MCNC: 36 MG/DL (ref 6–23)
CALCIUM SERPL-MCNC: 8.9 MG/DL (ref 8.3–10.6)
CHLORIDE BLD-SCNC: 93 MMOL/L (ref 99–110)
CO2: 33 MMOL/L (ref 21–32)
CREAT SERPL-MCNC: 1.9 MG/DL (ref 0.9–1.3)
GFR AFRICAN AMERICAN: 41 ML/MIN/1.73M2
GFR NON-AFRICAN AMERICAN: 34 ML/MIN/1.73M2
GLUCOSE BLD-MCNC: 75 MG/DL (ref 70–99)
HCT VFR BLD CALC: 33.9 % (ref 42–52)
HEMOGLOBIN: 10.2 GM/DL (ref 13.5–18)
MCH RBC QN AUTO: 27.3 PG (ref 27–31)
MCHC RBC AUTO-ENTMCNC: 30.1 % (ref 32–36)
MCV RBC AUTO: 90.9 FL (ref 78–100)
PDW BLD-RTO: 27 % (ref 11.7–14.9)
PLATELET # BLD: 314 K/CU MM (ref 140–440)
PMV BLD AUTO: 10.4 FL (ref 7.5–11.1)
POTASSIUM SERPL-SCNC: 3.6 MMOL/L (ref 3.5–5.1)
RBC # BLD: 3.73 M/CU MM (ref 4.6–6.2)
SODIUM BLD-SCNC: 142 MMOL/L (ref 135–145)
TOTAL PROTEIN: 5.5 GM/DL (ref 6.4–8.2)
WBC # BLD: 4 K/CU MM (ref 4–10.5)

## 2020-05-12 PROCEDURE — 36415 COLL VENOUS BLD VENIPUNCTURE: CPT

## 2020-05-12 PROCEDURE — 80048 BASIC METABOLIC PNL TOTAL CA: CPT

## 2020-05-12 PROCEDURE — 80053 COMPREHEN METABOLIC PANEL: CPT

## 2020-05-12 PROCEDURE — 85027 COMPLETE CBC AUTOMATED: CPT

## 2020-05-15 ENCOUNTER — HOSPITAL ENCOUNTER (OUTPATIENT)
Age: 85
Setting detail: SPECIMEN
Discharge: HOME | End: 2020-05-15
Payer: MEDICARE

## 2020-05-15 LAB
BILIRUBIN URINE: NORMAL
BLOOD, URINE: NORMAL
CLARITY: NORMAL
COLOR: NORMAL
GLUCOSE, URINE: NORMAL MG/DL
KETONES, URINE: NORMAL MG/DL
LEUKOCYTE ESTERASE, URINE: NORMAL
NITRITE URINE, QUANTITATIVE: NORMAL
PH, URINE: NORMAL
PROTEIN UA: NORMAL MG/DL
SPECIFIC GRAVITY UA: NORMAL
UROBILINOGEN, URINE: NORMAL MG/DL

## 2020-05-15 PROCEDURE — 87086 URINE CULTURE/COLONY COUNT: CPT

## 2020-05-17 LAB
CULTURE: NORMAL
Lab: NORMAL
SPECIMEN: NORMAL

## 2020-05-19 ENCOUNTER — HOSPITAL ENCOUNTER (OUTPATIENT)
Age: 85
Setting detail: SPECIMEN
Discharge: HOME | End: 2020-05-19

## 2020-05-19 LAB
HCT VFR BLD CALC: 36.5 % (ref 42–52)
HEMOGLOBIN: 10.2 GM/DL (ref 13.5–18)
MCH RBC QN AUTO: 27.3 PG (ref 27–31)
MCHC RBC AUTO-ENTMCNC: 27.9 % (ref 32–36)
MCV RBC AUTO: 97.6 FL (ref 78–100)
PDW BLD-RTO: 26.3 % (ref 11.7–14.9)
PLATELET # BLD: 202 K/CU MM (ref 140–440)
PMV BLD AUTO: 9.9 FL (ref 7.5–11.1)
RBC # BLD: 3.74 M/CU MM (ref 4.6–6.2)
REASON FOR REJECTION: NORMAL
REJECTED TEST: NORMAL
WBC # BLD: 4.7 K/CU MM (ref 4–10.5)

## 2020-05-19 PROCEDURE — 36415 COLL VENOUS BLD VENIPUNCTURE: CPT

## 2020-05-19 PROCEDURE — 85027 COMPLETE CBC AUTOMATED: CPT

## 2020-05-20 ENCOUNTER — HOSPITAL ENCOUNTER (OUTPATIENT)
Age: 85
Setting detail: SPECIMEN
Discharge: HOME | End: 2020-05-20

## 2020-05-20 LAB
ALBUMIN SERPL-MCNC: 3.5 GM/DL (ref 3.4–5)
ALP BLD-CCNC: 94 IU/L (ref 40–129)
ALT SERPL-CCNC: 11 U/L (ref 10–40)
ANION GAP SERPL CALCULATED.3IONS-SCNC: 14 MMOL/L (ref 4–16)
AST SERPL-CCNC: 22 IU/L (ref 15–37)
BILIRUB SERPL-MCNC: 0.2 MG/DL (ref 0–1)
BUN BLDV-MCNC: 30 MG/DL (ref 6–23)
CALCIUM SERPL-MCNC: 8.7 MG/DL (ref 8.3–10.6)
CHLORIDE BLD-SCNC: 102 MMOL/L (ref 99–110)
CO2: 24 MMOL/L (ref 21–32)
CREAT SERPL-MCNC: 1.5 MG/DL (ref 0.9–1.3)
GFR AFRICAN AMERICAN: 54 ML/MIN/1.73M2
GFR NON-AFRICAN AMERICAN: 45 ML/MIN/1.73M2
GLUCOSE BLD-MCNC: 66 MG/DL (ref 70–99)
POTASSIUM SERPL-SCNC: 4.9 MMOL/L (ref 3.5–5.1)
SODIUM BLD-SCNC: 140 MMOL/L (ref 135–145)
TOTAL PROTEIN: 5.4 GM/DL (ref 6.4–8.2)

## 2020-05-20 PROCEDURE — 80053 COMPREHEN METABOLIC PANEL: CPT

## 2020-05-20 PROCEDURE — 36415 COLL VENOUS BLD VENIPUNCTURE: CPT

## 2020-11-16 ENCOUNTER — HOSPITAL ENCOUNTER (OUTPATIENT)
Age: 85
Setting detail: SPECIMEN
Discharge: HOME | End: 2020-11-16
Payer: MEDICARE

## 2020-11-16 LAB
BACTERIA: ABNORMAL /HPF
BILIRUBIN URINE: NEGATIVE MG/DL
BLOOD, URINE: ABNORMAL
CLARITY: ABNORMAL
COLOR: YELLOW
GLUCOSE, URINE: NEGATIVE MG/DL
HYALINE CASTS: 5 /LPF
KETONES, URINE: NEGATIVE MG/DL
LEUKOCYTE ESTERASE, URINE: ABNORMAL
NITRITE URINE, QUANTITATIVE: POSITIVE
PH, URINE: 5 (ref 5–8)
PROTEIN UA: NEGATIVE MG/DL
RBC URINE: 1 /HPF (ref 0–3)
SPECIFIC GRAVITY UA: 1.01 (ref 1–1.03)
TRICHOMONAS: ABNORMAL /HPF
UROBILINOGEN, URINE: NORMAL MG/DL (ref 0.2–1)
WBC UA: 13 /HPF (ref 0–2)

## 2020-11-16 PROCEDURE — 81001 URINALYSIS AUTO W/SCOPE: CPT

## 2020-11-16 PROCEDURE — 87086 URINE CULTURE/COLONY COUNT: CPT

## 2020-11-17 LAB
CULTURE: NORMAL
Lab: NORMAL
SPECIMEN: NORMAL

## 2020-11-29 ENCOUNTER — APPOINTMENT (OUTPATIENT)
Dept: CT IMAGING | Age: 85
End: 2020-11-29
Payer: MEDICARE

## 2020-11-29 ENCOUNTER — HOSPITAL ENCOUNTER (EMERGENCY)
Age: 85
Discharge: HOME | End: 2020-11-29
Payer: MEDICARE

## 2020-11-29 VITALS
RESPIRATION RATE: 16 BRPM | TEMPERATURE: 98.9 F | OXYGEN SATURATION: 92 % | SYSTOLIC BLOOD PRESSURE: 97 MMHG | DIASTOLIC BLOOD PRESSURE: 47 MMHG | HEIGHT: 68 IN | WEIGHT: 150 LBS | HEART RATE: 82 BPM | BODY MASS INDEX: 22.73 KG/M2

## 2020-11-29 PROCEDURE — 70450 CT HEAD/BRAIN W/O DYE: CPT

## 2020-11-29 PROCEDURE — 72125 CT NECK SPINE W/O DYE: CPT

## 2020-11-29 PROCEDURE — 99284 EMERGENCY DEPT VISIT MOD MDM: CPT

## 2020-11-29 NOTE — ED TRIAGE NOTES
Pt presents to ED via EMS c/o fall with laceration to left eyebrow. Pt has hx of dementia and is a DNR CC. Pt is alert and answers questions to the best of his ability. Denies pain.

## 2020-11-29 NOTE — ED NOTES
Bed: 02TR-02  Expected date:   Expected time:   Means of arrival:   Comments:  EMS- fall, head injury     Davina Ibarra  11/29/20 5654

## 2020-11-29 NOTE — ED NOTES
Hospice was called for pt for P/U . Hospice has arraigned for Med trans to  pt to bring him home at [phone].       Lyn Leavitt RN  11/29/20 4375

## 2020-11-29 NOTE — ED NOTES
Jg with [address] called and would like a return call. Phone number: [phone] option 1.      Eva Lewis RN  11/29/20 9017

## 2020-11-29 NOTE — ED PROVIDER NOTES
Emergency [address] EMERGENCY DEPARTMENT    Patient: Deb Dorantes  MRN: [id number]  : 1935  Date of Evaluation: 2020  ED Provider: Sweta Chambers PA-C    Chief Complaint       Chief Complaint   Patient presents with   Anya Latty    Laceration     left eyebrow       [place]     Deb Dorantes is a 81 y.o. male who presents to the emergency department following a fall. Patient has dementia and is DNR-CC/hospice from home. Lives with his sons & daughter. I spoke with daughter, Victorina Adam, who is POA. She states patient has a history of frequent falls because he gets up out of bed and falls. She states someone sleeps in the same room trying to prevent these falls, but he continues to get up out of bed. She reports he actually fell on Tuesday (5 days ago) and cut his left eye but was not sent in at the time. Sommer Streeter again tonight so they decided to send him in. He does have some skin tears to the UEs. No other injuries. He is not on any blood thinners. Patient complains of tenderness to laceration site but denies any other complaints. ROS     HEAD:  Denies headache. NECK:  Denies neck pain. CARDIOVASCULAR:  Denies chest pain. GI:  Denies nausea or vomiting. MUSCULOSKELETAL:  Denies extremity pain or swelling. BACK:  Denies back pain. INTEGUMENT:  + eyebrow laceration, skin tears. NEUROLOGIC:  Denies any LOC or AMS. 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 No aprical clott.  H/O percutaneous left heart catheterization 2019    Severe multivessel disease.    [address] [employer] Primary Care Physician Is Dr. Alexus Mayo    Hypertension     Malignant neoplasm of kidney [facility]) Right    MI (myocardial infarction) ([facility] 2019    NSTEMI    Nerve damage     \"Both Feet\"    Renal mass     right    UTI (urinary tract infection)     Last One 2-13    Wears glasses     \"Just To Read\"     Past Surgical History:   Procedure Laterality Date    COLECTOMY  12    \"Took 1.5 Foot Large Colon\", Colon Cancer    COLON SURGERY      2012    COLONOSCOPY      EYE SURGERY Bilateral 's    Cataracts With Lens Implants Eyes    FOOT SURGERY Right     \"Gangrene Right Foot, Amputated Right Little Toe, Little Bit Right Foot\"    OTHER SURGICAL HISTORY Right 2013    right robotic assist radical nephrectomy    TOE AMPUTATION      little toes , bilaterally     TOE AMPUTATION Left     \"Little Toe\"   [facility]     Social History     Socioeconomic History    Marital status:       Spouse name: None    Number of children: None    Years of education: None    Highest education level: None   Occupational History    None   Social Needs    Financial resource strain: None    Food insecurity     Worry: None     Inability: None    Transportation needs     Medical: None     Non-medical: None   Tobacco Use    Smoking status: Former Smoker     Packs/day: 1.50     Years: 35.00     Pack years: 52.50     Last attempt to quit: 1977     Years since quittin.3    Smokeless tobacco: Never Used   Substance and Sexual Activity    Alcohol use: Yes     Comment: once in awhile     Drug use: No    Sexual activity: Not Currently   Lifestyle    Physical activity     Days per week: None     Minutes per session: None    Stress: None   Relationships    Social connections     Talks on phone: None     Gets together: None     Attends Restorationism service: None     Active member of club or organization: None     Attends meetings of clubs or organizations: None Relationship status: None    Intimate partner violence     Fear of current or ex partner: None     Emotionally abused: None     Physically abused: None     Forced sexual activity: None   Other Topics Concern    None   Social History Narrative    None       Medications/Allergies     Previous Medications    ASPIRIN EC 81 MG EC TABLET    Take 1 tablet by mouth daily    ATORVASTATIN (LIPITOR) 40 MG TABLET    Take 1 tablet by mouth nightly    FERROUS SULFATE (IRON 325) 325 (65 FE) MG TABLET    Take 1 tablet by mouth daily    FOLIC ACID (FOLVITE) 1 MG TABLET    Take 1 tablet by mouth daily    FUROSEMIDE (LASIX) 80 MG TABLET    Take 1 tablet by mouth 2 times daily    METOLAZONE (ZAROXOLYN) 5 MG TABLET    Give every Tuesday, Thursday, and Saturday    MIDODRINE (PROAMATINE) 10 MG TABLET    Take 1 tablet by mouth 3 times daily (with meals)    PANTOPRAZOLE (PROTONIX) 40 MG TABLET    Take 1 tablet by mouth every morning (before breakfast)    PYRIDOSTIGMINE (MESTINON) 60 MG TABLET    Take 60 mg by mouth 3 times daily     No Known Allergies     Physical Exam       ED Triage Vitals [11/29/20 0325]   BP Temp Temp Source Pulse Resp SpO2 Height Weight   (!) 97/47 98.9 °F (37.2 °C) Oral 82 16 92 % 5' 8\" (1.727 m) 150 lb (68 kg)     GENERAL APPEARANCE:  Well-developed, well-nourished elderly male in no acute distress. HEAD:  NC/AT. EYES:  Sclera anicteric. Laceration to the left eyebrow area with dried blood present. ENT:  Ears, nose, mouth normal.     NECK:  Supple. CARDIO:  RRR. LUNGS:   CTAB. Respirations unlabored. ABDOMEN:  Soft, non-distended, non-tender. EXTREMITIES:  No acute deformities. SKIN:  Warm and dry. Numerous areas of bruising and superficial skin tears to the UEs. NEUROLOGICAL:  Alert and oriented on my exam.  Tremors. PSYCHIATRIC:  Normal mood. Diagnostics     Labs:  No results found for this visit on 11/29/20.     Radiographs:  Ct Head Wo Contrast    Result Date: 11/29/2020  EXAMINATION: CONTRAST 11/29/2020 4:06 am TECHNIQUE: CT of the cervical spine was performed without the administration of intravenous contrast. Multiplanar reformatted images are provided for review. Dose modulation, iterative reconstruction, and/or weight based adjustment of the mA/kV was utilized to reduce the radiation dose to as low as reasonably achievable. COMPARISON: Same day CT head HISTORY: ORDERING SYSTEM PROVIDED HISTORY: injury TECHNOLOGIST PROVIDED HISTORY: Reason for exam:->injury Reason for Exam: fall Acuity: Acute Type of Exam: Initial Mechanism of Injury: Pt presents to ED via EMS c/o fall with laceration to left eyebrow. Pt has hx of dementia and is a DNR CC. Pt is alert and answers questions to the best of his ability. Denies pain. Relevant Medical/Surgical History: pain FINDINGS: BONES/ALIGNMENT: There is no acute fracture or traumatic malalignment. DEGENERATIVE CHANGES: Multilevel degenerative disc disease with facet arthrosis. Mild canal stenosis throughout much of the cervical spine with multilevel foraminal narrowing. SOFT TISSUES: Prevertebral soft tissue swelling. Mild paraseptal emphysema at the lung apices. Segmental gaseous distention of the upper esophagus with frothy debris. No acute cervical spine abnormality. Advanced cervical spondylosis with facet arthrosis. Presbyesophagus. ED Course and MDM   -  Patient seen and evaluated in the emergency department. -  Triage and nursing notes reviewed and incorporated. -  Old chart records reviewed and incorporated. -  Supervising physician was Dr. Pita Canales. Patient was seen independently. -  Work-up included:  CT head, c-spine  -  Laceration to left eyebrow cleansed per nursing.  -  Results discussed with patient. CT head and c-spine are negative for acute findings. Patient alert and oriented, very pleasant on exam.  We will notify family of negative imaging and arrange for transport home. FU with PCP, return as needed.   He is agreeable with plan of care and disposition.  -  Disposition:  Home    In light of current events, I did utilize appropriate PPE (including surgical face mask, safety glasses, and gloves, as recommended by the health facility/national standard best practice, during my bedside interactions with the patient. Final Impression      1. Frequent falls    2. Laceration of left eyebrow, initial encounter    3.  Multiple skin tears            DISPOSITION        [address], [place]  [facility]  11/29/20 4460

## 2021-08-19 NOTE — H&P
He has never used smokeless tobacco. He reports current alcohol use. He reports that he does not use drugs. Prior (baseline) level of function: Supervised level of function prior to admission. Current level of function: Minimal (25%) physical assistance needed for safe mobility and self-care. Allergies:  Patient has no known allergies. Past Medical History:   Past Medical History:   Diagnosis Date    CHF (congestive heart failure) (HCC)     Grade II diastolic dysfunction    Colon Cancer 7-24-12    \"Took 1.5 Foot Large Colon\"    Diabetes mellitus ([facility] [address] Dx 1998    Gangrene ([facility] [address] 1998    \"Gangrene Right Foot, Amputated Right Little Toe And Little Bit Of Right Foot\"    H/O Limited echocardiogram 05/17/2019    EF 30-35%. No aprical clott.  H/O percutaneous left heart catheterization 03/02/2019    Severe multivessel disease.    [facility] OTHER MEDICAL     Primary Care Physician Is Dr. Jacobs Solid    Hypertension     Malignant neoplasm of kidney [facility]) Right    MI (myocardial infarction) ([facility] [address] 03/02/2019    NSTEMI    Nerve damage     \"Both Feet\"    Renal mass 2012    right    UTI (urinary tract infection)     Last One 2-13    Wears glasses     \"Just To Read\"        Past Surgical History:     Past Surgical History:   Procedure Laterality Date    COLECTOMY  7-24-12    \"Took 1.5 Foot Large Colon\", Colon Cancer    COLON SURGERY      april 2012    COLONOSCOPY  2012    EYE SURGERY Bilateral 2000's    Cataracts With Lens Implants Eyes    FOOT SURGERY Right 1998    \"Gangrene Right Foot, Amputated Right Little Toe, Little Bit Right Foot\"    OTHER SURGICAL HISTORY Right 03 18 2013    right robotic assist radical nephrectomy    TOE AMPUTATION      little toes , bilaterally     TOE AMPUTATION Left 2002    \"Little Toe\"    VASECTOMY      VASECTOMY  1971       Current Medications:     Current Facility-Administered Medications:     promethazine (PHENERGAN) tablet 12.5 mg, 12.5 mg, Oral, Q6H PRN **OR** ondansetron (ZOFRAN) injection 4 mg, 4 mg, Intravenous, Q6H PRN, Margo Leahy MD    [START ON 3/26/2020] aspirin EC tablet 81 mg, 81 mg, Oral, Daily, Margo Leahy MD    atorvastatin (LIPITOR) tablet 40 mg, 40 mg, Oral, Nightly, Margo Leahy MD, 40 mg at 03/25/20 2039    [START ON 3/26/2020] enoxaparin (LOVENOX) injection 30 mg, 30 mg, Subcutaneous, Daily, Margo Leahy MD  [facility]  Ada Crome ON 3/26/2020] isosorbide mononitrate (IMDUR) extended release tablet 30 mg, 30 mg, Oral, Daily, Margo Leahy MD    midodrine (PROAMATINE) tablet 10 mg, 10 mg, Oral, TID WC, Magro Leahy MD, 10 mg at 03/25/20 1704    pyridostigmine (MESTINON) tablet 60 mg, 60 mg, Oral, TID, Margo Leahy MD, 60 mg at 03/25/20 2039    acetaminophen (TYLENOL) tablet 650 mg, 650 mg, Oral, Q4H PRN, SCOTT Conn MD    polyethylene glycol [facility]) packet 17 g, 17 g, Oral, Daily PRN, SCOTT Conn MD    Family History:   No family history on file. Exam:    Blood pressure (!) 127/58, pulse 57, temperature 97.4 °F (36.3 °C), temperature source Oral, resp. rate 16, height 5' 8\" (1.727 m), weight 144 lb 12.8 oz (65.7 kg), SpO2 97 %. General: Patient was seen sitting up in a bedside chair. He is alert and talkative but oriented only x2. Recall some recent events changes through the course of the visit. He is in no distress. Quite pleasant. HEENT: Mucous membranes are little dry. Neck is supple. No signs of trauma to his head or neck. Visual fields are full. No adenopathy. Pulmonary: Some rhonchi in the bases. No wheezes, rales or coughing. Cardiac: Soft pansystolic murmur with a regular rate and rhythm. Distant heart sounds. Abdomen: Patient's abdomen was soft and nondistended. Bowel sounds were present throughout. There was no rebound, guarding or masses noted. Spinal exam: Skin overlying spines intact. Dorsal kyphosis is slightly exaggerated. Trunk rotation is completed freely.     Upper extremities: He can bring both hands up to meet mine. He lacks reflexes but has normal tone. Upper limb strength is 4-/5 throughout. Lower extremities: Lower limb strength is 3+/5 across the knees and ankles. Clumsiness is present. He has no peripheral edema. Heels are clear. No signs of DVT. Sitting balance was fair. Standing balance was poor. Lab Results   Component Value Date    WBC 5.4 03/25/2020    HGB 7.2 (L) 03/25/2020    HCT 26.1 (L) 03/25/2020    MCV 87.3 03/25/2020     03/25/2020     Lab Results   Component Value Date    INR 1.19 11/14/2019    INR 1.09 03/02/2018    INR 0.95 03/12/2013    PROTIME 13.6 11/14/2019    PROTIME 12.6 (H) 03/02/2018    PROTIME 10.4 03/12/2013     Lab Results   Component Value Date    CREATININE 1.3 03/25/2020    BUN 29 (H) 03/25/2020     03/25/2020    K 4.4 03/25/2020     03/25/2020    CO2 24 03/25/2020     Lab Results   Component Value Date    ALT 13 03/25/2020    AST 21 03/25/2020    ALKPHOS 96 03/25/2020    BILITOT 0.2 03/25/2020         Impression: 60-year-old male with recent syncopal episodes and falls. He has chronic anemia and ischemic cardiomyopathy. Although recent hemoglobin A1c's have been acceptable, several years ago his A1c value was greater than 10.3. He reports having peripheral neuropathy related to diabetes at that time. Strengths for the patient: Supportive family, his alertness and some experience with adaptive equipment. Limitations/barriers for the patient: His memory compromise, the inaccessibility of his living arrangement and his multiple medical comorbidities. Recommendation: Acute inpatient rehabilitation with occupational and physical therapy 180 minutes 5 out of every 7 days. Will address basic and  advancing mobility with self-care instruction and adaptive equipment training. Caregiver education will be offered.  Expected length of stay  prior to a supervised level of function for discharge home with a Patient seen, denies any concerns, but endorses not wanting surgery. Cousin Jaycob appears to be surrogate if agreeable, will continue to clarify. d/w CTU team no plan for any emergent interventions, aside from possible palliative pericardial effusion drainage if she desires, but this doesn't appear to be within her goals; despite lack of capacity, she would not assent to this treatment.     ______________  Brenden Rodriguez MD   of Geriatric and Palliative Medicine  [facility]     Please page the following number for clinical matters between the hours of 9AM and 5PM   from Monday through Friday : [phone]    After 5PM and on weekends, please page: [phone]. The Geriatric and Palliative Medicine consult service has 24/7 coverage for medical recommendations, including for symptom management needs.